# Patient Record
Sex: FEMALE | Race: BLACK OR AFRICAN AMERICAN | NOT HISPANIC OR LATINO | URBAN - METROPOLITAN AREA
[De-identification: names, ages, dates, MRNs, and addresses within clinical notes are randomized per-mention and may not be internally consistent; named-entity substitution may affect disease eponyms.]

---

## 2019-06-06 ENCOUNTER — INPATIENT (INPATIENT)
Facility: HOSPITAL | Age: 69
LOS: 12 days | Discharge: NURSING, REHAB OR LONG-TERM CARE | End: 2019-06-19
Attending: HOSPITALIST | Admitting: HOSPITALIST
Payer: MEDICARE

## 2019-06-06 VITALS
OXYGEN SATURATION: 99 % | WEIGHT: 140.21 LBS | SYSTOLIC BLOOD PRESSURE: 119 MMHG | HEART RATE: 62 BPM | DIASTOLIC BLOOD PRESSURE: 50 MMHG

## 2019-06-06 DIAGNOSIS — A41.9 SEPSIS, UNSPECIFIED ORGANISM: ICD-10-CM

## 2019-06-06 LAB
ALBUMIN SERPL ELPH-MCNC: 3.6 G/DL — SIGNIFICANT CHANGE UP (ref 3.3–5)
ALP SERPL-CCNC: 58 U/L — SIGNIFICANT CHANGE UP (ref 40–120)
ALT FLD-CCNC: 11 U/L — SIGNIFICANT CHANGE UP (ref 4–33)
ANION GAP SERPL CALC-SCNC: 8 MMO/L — SIGNIFICANT CHANGE UP (ref 7–14)
ANISOCYTOSIS BLD QL: SLIGHT — SIGNIFICANT CHANGE UP
APPEARANCE UR: CLEAR — SIGNIFICANT CHANGE UP
AST SERPL-CCNC: 21 U/L — SIGNIFICANT CHANGE UP (ref 4–32)
BASE EXCESS BLDV CALC-SCNC: 10.3 MMOL/L — SIGNIFICANT CHANGE UP
BASE EXCESS BLDV CALC-SCNC: 4.8 MMOL/L — SIGNIFICANT CHANGE UP
BASOPHILS # BLD AUTO: 0 K/UL — SIGNIFICANT CHANGE UP (ref 0–0.2)
BASOPHILS NFR BLD AUTO: 0 % — SIGNIFICANT CHANGE UP (ref 0–2)
BASOPHILS NFR SPEC: 0 % — SIGNIFICANT CHANGE UP (ref 0–2)
BILIRUB SERPL-MCNC: < 0.2 MG/DL — LOW (ref 0.2–1.2)
BILIRUB UR-MCNC: NEGATIVE — SIGNIFICANT CHANGE UP
BLASTS # FLD: 0 % — SIGNIFICANT CHANGE UP (ref 0–0)
BLOOD GAS VENOUS - CREATININE: 0.59 MG/DL — SIGNIFICANT CHANGE UP (ref 0.5–1.3)
BLOOD GAS VENOUS - CREATININE: SIGNIFICANT CHANGE UP MG/DL (ref 0.5–1.3)
BLOOD UR QL VISUAL: NEGATIVE — SIGNIFICANT CHANGE UP
BUN SERPL-MCNC: 29 MG/DL — HIGH (ref 7–23)
CALCIUM SERPL-MCNC: 9.5 MG/DL — SIGNIFICANT CHANGE UP (ref 8.4–10.5)
CHLORIDE BLDV-SCNC: 106 MMOL/L — SIGNIFICANT CHANGE UP (ref 96–108)
CHLORIDE BLDV-SCNC: 111 MMOL/L — HIGH (ref 96–108)
CHLORIDE SERPL-SCNC: 104 MMOL/L — SIGNIFICANT CHANGE UP (ref 98–107)
CO2 SERPL-SCNC: 35 MMOL/L — HIGH (ref 22–31)
COLOR SPEC: SIGNIFICANT CHANGE UP
CORTIS SERPL-MCNC: 5.9 UG/DL — SIGNIFICANT CHANGE UP (ref 2.7–18.4)
CREAT SERPL-MCNC: 0.55 MG/DL — SIGNIFICANT CHANGE UP (ref 0.5–1.3)
EOSINOPHIL # BLD AUTO: 0.01 K/UL — SIGNIFICANT CHANGE UP (ref 0–0.5)
EOSINOPHIL NFR BLD AUTO: 0.4 % — SIGNIFICANT CHANGE UP (ref 0–6)
EOSINOPHIL NFR FLD: 0.9 % — SIGNIFICANT CHANGE UP (ref 0–6)
GAS PNL BLDV: 142 MMOL/L — SIGNIFICANT CHANGE UP (ref 136–146)
GAS PNL BLDV: 143 MMOL/L — SIGNIFICANT CHANGE UP (ref 136–146)
GIANT PLATELETS BLD QL SMEAR: PRESENT — SIGNIFICANT CHANGE UP
GLUCOSE BLDV-MCNC: 177 MG/DL — HIGH (ref 70–99)
GLUCOSE BLDV-MCNC: 86 MG/DL — SIGNIFICANT CHANGE UP (ref 70–99)
GLUCOSE SERPL-MCNC: 92 MG/DL — SIGNIFICANT CHANGE UP (ref 70–99)
GLUCOSE UR-MCNC: 150 — HIGH
HCO3 BLDV-SCNC: 29 MMOL/L — HIGH (ref 20–27)
HCO3 BLDV-SCNC: 32 MMOL/L — HIGH (ref 20–27)
HCT VFR BLD CALC: 29.5 % — LOW (ref 34.5–45)
HCT VFR BLD CALC: 35.3 % — SIGNIFICANT CHANGE UP (ref 34.5–45)
HCT VFR BLDV CALC: 29.9 % — LOW (ref 34.5–45)
HCT VFR BLDV CALC: 33.3 % — LOW (ref 34.5–45)
HGB BLD-MCNC: 10.9 G/DL — LOW (ref 11.5–15.5)
HGB BLD-MCNC: 9.1 G/DL — LOW (ref 11.5–15.5)
HGB BLDV-MCNC: 10.8 G/DL — LOW (ref 11.5–15.5)
HGB BLDV-MCNC: 9.7 G/DL — LOW (ref 11.5–15.5)
HYPOCHROMIA BLD QL: SLIGHT — SIGNIFICANT CHANGE UP
IMM GRANULOCYTES NFR BLD AUTO: 0.8 % — SIGNIFICANT CHANGE UP (ref 0–1.5)
KETONES UR-MCNC: SIGNIFICANT CHANGE UP
LACTATE BLDV-MCNC: 0.9 MMOL/L — SIGNIFICANT CHANGE UP (ref 0.5–2)
LACTATE BLDV-MCNC: 1.4 MMOL/L — SIGNIFICANT CHANGE UP (ref 0.5–2)
LEUKOCYTE ESTERASE UR-ACNC: NEGATIVE — SIGNIFICANT CHANGE UP
LYMPHOCYTES # BLD AUTO: 1.23 K/UL — SIGNIFICANT CHANGE UP (ref 1–3.3)
LYMPHOCYTES # BLD AUTO: 50.6 % — HIGH (ref 13–44)
LYMPHOCYTES NFR SPEC AUTO: 57.8 % — HIGH (ref 13–44)
MACROCYTES BLD QL: SLIGHT — SIGNIFICANT CHANGE UP
MAGNESIUM SERPL-MCNC: 1.6 MG/DL — SIGNIFICANT CHANGE UP (ref 1.6–2.6)
MAGNESIUM SERPL-MCNC: 2.1 MG/DL — SIGNIFICANT CHANGE UP (ref 1.6–2.6)
MANUAL SMEAR VERIFICATION: SIGNIFICANT CHANGE UP
MCHC RBC-ENTMCNC: 28.7 PG — SIGNIFICANT CHANGE UP (ref 27–34)
MCHC RBC-ENTMCNC: 29.1 PG — SIGNIFICANT CHANGE UP (ref 27–34)
MCHC RBC-ENTMCNC: 30.8 % — LOW (ref 32–36)
MCHC RBC-ENTMCNC: 30.9 % — LOW (ref 32–36)
MCV RBC AUTO: 93.1 FL — SIGNIFICANT CHANGE UP (ref 80–100)
MCV RBC AUTO: 94.1 FL — SIGNIFICANT CHANGE UP (ref 80–100)
METAMYELOCYTES # FLD: 0 % — SIGNIFICANT CHANGE UP (ref 0–1)
MONOCYTES # BLD AUTO: 0.29 K/UL — SIGNIFICANT CHANGE UP (ref 0–0.9)
MONOCYTES NFR BLD AUTO: 11.9 % — SIGNIFICANT CHANGE UP (ref 2–14)
MONOCYTES NFR BLD: 5.2 % — SIGNIFICANT CHANGE UP (ref 2–9)
MYELOCYTES NFR BLD: 0 % — SIGNIFICANT CHANGE UP (ref 0–0)
NEUTROPHIL AB SER-ACNC: 32.7 % — LOW (ref 43–77)
NEUTROPHILS # BLD AUTO: 0.88 K/UL — LOW (ref 1.8–7.4)
NEUTROPHILS NFR BLD AUTO: 36.3 % — LOW (ref 43–77)
NEUTS BAND # BLD: 0 % — SIGNIFICANT CHANGE UP (ref 0–6)
NITRITE UR-MCNC: NEGATIVE — SIGNIFICANT CHANGE UP
NRBC # BLD: 2 /100WBC — SIGNIFICANT CHANGE UP
NRBC # FLD: 0 K/UL — SIGNIFICANT CHANGE UP (ref 0–0)
NRBC # FLD: 0 K/UL — SIGNIFICANT CHANGE UP (ref 0–0)
OTHER - HEMATOLOGY %: 0 — SIGNIFICANT CHANGE UP
PCO2 BLDV: 39 MMHG — LOW (ref 41–51)
PCO2 BLDV: 72 MMHG — HIGH (ref 41–51)
PH BLDV: 7.33 PH — SIGNIFICANT CHANGE UP (ref 7.32–7.43)
PH BLDV: 7.47 PH — HIGH (ref 7.32–7.43)
PH UR: 6 — SIGNIFICANT CHANGE UP (ref 5–8)
PHOSPHATE SERPL-MCNC: 3.7 MG/DL — SIGNIFICANT CHANGE UP (ref 2.5–4.5)
PLATELET # BLD AUTO: 48 K/UL — LOW (ref 150–400)
PLATELET # BLD AUTO: 69 K/UL — LOW (ref 150–400)
PLATELET COUNT - ESTIMATE: SIGNIFICANT CHANGE UP
PMV BLD: 13 FL — SIGNIFICANT CHANGE UP (ref 7–13)
PMV BLD: 13.7 FL — HIGH (ref 7–13)
PO2 BLDV: 50 MMHG — HIGH (ref 35–40)
PO2 BLDV: 70 MMHG — HIGH (ref 35–40)
POLYCHROMASIA BLD QL SMEAR: SLIGHT — SIGNIFICANT CHANGE UP
POTASSIUM BLDV-SCNC: 3.2 MMOL/L — LOW (ref 3.4–4.5)
POTASSIUM BLDV-SCNC: 3.8 MMOL/L — SIGNIFICANT CHANGE UP (ref 3.4–4.5)
POTASSIUM SERPL-MCNC: 3.9 MMOL/L — SIGNIFICANT CHANGE UP (ref 3.5–5.3)
POTASSIUM SERPL-SCNC: 3.9 MMOL/L — SIGNIFICANT CHANGE UP (ref 3.5–5.3)
PROMYELOCYTES # FLD: 0 % — SIGNIFICANT CHANGE UP (ref 0–0)
PROT SERPL-MCNC: 6.2 G/DL — SIGNIFICANT CHANGE UP (ref 6–8.3)
PROT UR-MCNC: NEGATIVE — SIGNIFICANT CHANGE UP
RBC # BLD: 3.17 M/UL — LOW (ref 3.8–5.2)
RBC # BLD: 3.75 M/UL — LOW (ref 3.8–5.2)
RBC # FLD: 13.9 % — SIGNIFICANT CHANGE UP (ref 10.3–14.5)
RBC # FLD: 14 % — SIGNIFICANT CHANGE UP (ref 10.3–14.5)
SAO2 % BLDV: 80.1 % — SIGNIFICANT CHANGE UP (ref 60–85)
SAO2 % BLDV: 95.5 % — HIGH (ref 60–85)
SODIUM SERPL-SCNC: 147 MMOL/L — HIGH (ref 135–145)
SP GR SPEC: 1.02 — SIGNIFICANT CHANGE UP (ref 1–1.04)
TROPONIN T, HIGH SENSITIVITY: 35 NG/L — SIGNIFICANT CHANGE UP (ref ?–14)
TSH SERPL-MCNC: 3.7 UIU/ML — SIGNIFICANT CHANGE UP (ref 0.27–4.2)
UROBILINOGEN FLD QL: NORMAL — SIGNIFICANT CHANGE UP
VARIANT LYMPHS # BLD: 3.4 % — SIGNIFICANT CHANGE UP
VIT B12 SERPL-MCNC: 1268 PG/ML — HIGH (ref 200–900)
WBC # BLD: 2.43 K/UL — LOW (ref 3.8–10.5)
WBC # BLD: 2.74 K/UL — LOW (ref 3.8–10.5)
WBC # FLD AUTO: 2.43 K/UL — LOW (ref 3.8–10.5)
WBC # FLD AUTO: 2.74 K/UL — LOW (ref 3.8–10.5)

## 2019-06-06 PROCEDURE — 70450 CT HEAD/BRAIN W/O DYE: CPT | Mod: 26

## 2019-06-06 PROCEDURE — 99291 CRITICAL CARE FIRST HOUR: CPT

## 2019-06-06 PROCEDURE — 93308 TTE F-UP OR LMTD: CPT | Mod: 26,GC

## 2019-06-06 PROCEDURE — 76604 US EXAM CHEST: CPT | Mod: 26,GC

## 2019-06-06 PROCEDURE — 71045 X-RAY EXAM CHEST 1 VIEW: CPT | Mod: 26

## 2019-06-06 RX ORDER — VANCOMYCIN HCL 1 G
1000 VIAL (EA) INTRAVENOUS EVERY 12 HOURS
Refills: 0 | Status: DISCONTINUED | OUTPATIENT
Start: 2019-06-06 | End: 2019-06-08

## 2019-06-06 RX ORDER — PANTOPRAZOLE SODIUM 20 MG/1
40 TABLET, DELAYED RELEASE ORAL DAILY
Refills: 0 | Status: DISCONTINUED | OUTPATIENT
Start: 2019-06-06 | End: 2019-06-19

## 2019-06-06 RX ORDER — FENTANYL CITRATE 50 UG/ML
0.5 INJECTION INTRAVENOUS
Qty: 2500 | Refills: 0 | Status: DISCONTINUED | OUTPATIENT
Start: 2019-06-06 | End: 2019-06-06

## 2019-06-06 RX ORDER — PIPERACILLIN AND TAZOBACTAM 4; .5 G/20ML; G/20ML
3.38 INJECTION, POWDER, LYOPHILIZED, FOR SOLUTION INTRAVENOUS ONCE
Refills: 0 | Status: COMPLETED | OUTPATIENT
Start: 2019-06-06 | End: 2019-06-06

## 2019-06-06 RX ORDER — MIDAZOLAM HYDROCHLORIDE 1 MG/ML
0.02 INJECTION, SOLUTION INTRAMUSCULAR; INTRAVENOUS
Qty: 100 | Refills: 0 | Status: DISCONTINUED | OUTPATIENT
Start: 2019-06-06 | End: 2019-06-06

## 2019-06-06 RX ORDER — HEPARIN SODIUM 5000 [USP'U]/ML
5000 INJECTION INTRAVENOUS; SUBCUTANEOUS EVERY 8 HOURS
Refills: 0 | Status: DISCONTINUED | OUTPATIENT
Start: 2019-06-06 | End: 2019-06-07

## 2019-06-06 RX ORDER — SODIUM CHLORIDE 9 MG/ML
1000 INJECTION INTRAMUSCULAR; INTRAVENOUS; SUBCUTANEOUS ONCE
Refills: 0 | Status: COMPLETED | OUTPATIENT
Start: 2019-06-06 | End: 2019-06-06

## 2019-06-06 RX ORDER — SODIUM CHLORIDE 9 MG/ML
2000 INJECTION INTRAMUSCULAR; INTRAVENOUS; SUBCUTANEOUS ONCE
Refills: 0 | Status: COMPLETED | OUTPATIENT
Start: 2019-06-06 | End: 2019-06-06

## 2019-06-06 RX ORDER — DEXAMETHASONE 0.5 MG/5ML
5 ELIXIR ORAL ONCE
Refills: 0 | Status: COMPLETED | OUTPATIENT
Start: 2019-06-06 | End: 2019-06-07

## 2019-06-06 RX ORDER — MIDAZOLAM HYDROCHLORIDE 1 MG/ML
0.02 INJECTION, SOLUTION INTRAMUSCULAR; INTRAVENOUS
Qty: 100 | Refills: 0 | Status: DISCONTINUED | OUTPATIENT
Start: 2019-06-06 | End: 2019-06-07

## 2019-06-06 RX ORDER — PROPOFOL 10 MG/ML
5 INJECTION, EMULSION INTRAVENOUS
Qty: 1000 | Refills: 0 | Status: DISCONTINUED | OUTPATIENT
Start: 2019-06-06 | End: 2019-06-06

## 2019-06-06 RX ORDER — ETOMIDATE 2 MG/ML
20 INJECTION INTRAVENOUS ONCE
Refills: 0 | Status: COMPLETED | OUTPATIENT
Start: 2019-06-06 | End: 2019-06-06

## 2019-06-06 RX ORDER — COSYNTROPIN 0.25 MG/ML
0.25 INJECTION, SOLUTION INTRAVENOUS ONCE
Refills: 0 | Status: COMPLETED | OUTPATIENT
Start: 2019-06-06 | End: 2019-06-07

## 2019-06-06 RX ORDER — AZITHROMYCIN 500 MG/1
500 TABLET, FILM COATED ORAL EVERY 24 HOURS
Refills: 0 | Status: DISCONTINUED | OUTPATIENT
Start: 2019-06-06 | End: 2019-06-07

## 2019-06-06 RX ORDER — PIPERACILLIN AND TAZOBACTAM 4; .5 G/20ML; G/20ML
3.38 INJECTION, POWDER, LYOPHILIZED, FOR SOLUTION INTRAVENOUS EVERY 8 HOURS
Refills: 0 | Status: DISCONTINUED | OUTPATIENT
Start: 2019-06-06 | End: 2019-06-12

## 2019-06-06 RX ORDER — DEXTROSE 50 % IN WATER 50 %
50 SYRINGE (ML) INTRAVENOUS ONCE
Refills: 0 | Status: COMPLETED | OUTPATIENT
Start: 2019-06-06 | End: 2019-06-06

## 2019-06-06 RX ORDER — CHLORHEXIDINE GLUCONATE 213 G/1000ML
1 SOLUTION TOPICAL
Refills: 0 | Status: DISCONTINUED | OUTPATIENT
Start: 2019-06-06 | End: 2019-06-12

## 2019-06-06 RX ORDER — CEFTRIAXONE 500 MG/1
1 INJECTION, POWDER, FOR SOLUTION INTRAMUSCULAR; INTRAVENOUS ONCE
Refills: 0 | Status: COMPLETED | OUTPATIENT
Start: 2019-06-06 | End: 2019-06-06

## 2019-06-06 RX ORDER — ROCURONIUM BROMIDE 10 MG/ML
100 VIAL (ML) INTRAVENOUS ONCE
Refills: 0 | Status: COMPLETED | OUTPATIENT
Start: 2019-06-06 | End: 2019-06-06

## 2019-06-06 RX ORDER — NOREPINEPHRINE BITARTRATE/D5W 8 MG/250ML
0.05 PLASTIC BAG, INJECTION (ML) INTRAVENOUS
Qty: 8 | Refills: 0 | Status: DISCONTINUED | OUTPATIENT
Start: 2019-06-06 | End: 2019-06-07

## 2019-06-06 RX ORDER — FENTANYL CITRATE 50 UG/ML
0.5 INJECTION INTRAVENOUS
Qty: 2500 | Refills: 0 | Status: DISCONTINUED | OUTPATIENT
Start: 2019-06-06 | End: 2019-06-09

## 2019-06-06 RX ADMIN — SODIUM CHLORIDE 1000 MILLILITER(S): 9 INJECTION INTRAMUSCULAR; INTRAVENOUS; SUBCUTANEOUS at 18:29

## 2019-06-06 RX ADMIN — CEFTRIAXONE 100 GRAM(S): 500 INJECTION, POWDER, FOR SOLUTION INTRAMUSCULAR; INTRAVENOUS at 18:28

## 2019-06-06 RX ADMIN — ETOMIDATE 20 MILLIGRAM(S): 2 INJECTION INTRAVENOUS at 18:50

## 2019-06-06 RX ADMIN — PROPOFOL 1.91 MICROGRAM(S)/KG/MIN: 10 INJECTION, EMULSION INTRAVENOUS at 19:52

## 2019-06-06 RX ADMIN — PIPERACILLIN AND TAZOBACTAM 200 GRAM(S): 4; .5 INJECTION, POWDER, LYOPHILIZED, FOR SOLUTION INTRAVENOUS at 21:08

## 2019-06-06 RX ADMIN — SODIUM CHLORIDE 1000 MILLILITER(S): 9 INJECTION INTRAMUSCULAR; INTRAVENOUS; SUBCUTANEOUS at 19:26

## 2019-06-06 RX ADMIN — Medication 5.96 MICROGRAM(S)/KG/MIN: at 19:51

## 2019-06-06 RX ADMIN — AZITHROMYCIN 250 MILLIGRAM(S): 500 TABLET, FILM COATED ORAL at 21:35

## 2019-06-06 RX ADMIN — Medication 100 MILLIGRAM(S): at 18:50

## 2019-06-06 RX ADMIN — Medication 50 MILLILITER(S): at 18:29

## 2019-06-06 NOTE — ED PROVIDER NOTE - CLINICAL SUMMARY MEDICAL DECISION MAKING FREE TEXT BOX
- lethargy, eval infection, r/o ICH as possible unwitnessed fall at home; r/o DVT in edematous LE  - cbc, cmp, trop, ua, urine culture, ekg, ct head, cxr - lethargy, eval infection, r/o ICH as possible unwitnessed fall at home; r/o DVT in edematous LE  - cbc, cmp, trop, ua, blood cx, urine culture, ekg, ct head, cxr    Aidan José, PGY1: h.o cva, dementia, schizo, p.w acute change in alertness w. hypothermia, concerning for sepsis. r.o ICH due to similar episode prior to last stroke. + for cough and LE swelling R>L, will get cxr and LE dvt.

## 2019-06-06 NOTE — H&P ADULT - NSHPPHYSICALEXAM_GEN_ALL_CORE
VS: /50, HR 62, T 90  GENERAL: NAD  HEAD:  Atraumatic, Normocephalic  EYES: EOMI, conjunctiva and sclera clear  NECK: Supple, No JVD  CHEST/LUNG: Coarse breath sound   HEART: Regular rate and rhythm; No murmurs, rubs, or gallops  ABDOMEN: Soft, Nontender, Nondistended; Bowel sounds present  EXTREMITIES:  2+ Peripheral Pulses, 1+ LE edema  PSYCH:   NEUROLOGY: non-focal  SKIN: No rashes or lesions

## 2019-06-06 NOTE — H&P ADULT - NSHPLABSRESULTS_GEN_ALL_CORE
10.9   2.43  )-----------( 69       ( 2019 17:06 )             35.3     2019 17:06    147    |  104    |  29     ----------------------------<  92     3.9     |  35     |  0.55     Ca    9.5        2019 17:06  Phos  3.7       2019 17:06  Mg     2.1       2019 17:06    TPro  6.2    /  Alb  3.6    /  TBili  < 0.2  /  DBili  x      /  AST  21     /  ALT  11     /  AlkPhos  58     2019 17:06    LIVER FUNCTIONS - ( 2019 17:06 )  Alb: 3.6 g/dL / Pro: 6.2 g/dL / ALK PHOS: 58 u/L / ALT: 11 u/L / AST: 21 u/L / GGT: x             CAPILLARY BLOOD GLUCOSE      POCT Blood Glucose.: 100 mg/dL (2019 15:45)        Urinalysis Basic - ( 2019 18:38 )    Color: LIGHT YELLOW / Appearance: CLEAR / S.023 / pH: 6.0  Gluc: 150 / Ketone: TRACE  / Bili: NEGATIVE / Urobili: NORMAL   Blood: NEGATIVE / Protein: NEGATIVE / Nitrite: NEGATIVE   Leuk Esterase: NEGATIVE / RBC: x / WBC x   Sq Epi: x / Non Sq Epi: x / Bacteria: x

## 2019-06-06 NOTE — ED ADULT NURSE NOTE - OBJECTIVE STATEMENT
break coverage RN: 69yof currently responsive only to painful stimuli, non ambulatory at this time brought in t ED for increased lethargy. per pt family at home, pt has hx dementia, but is typically a&ox3, amb w/ her walker at baseline. pt daughter states over the past week pt has become increasingly fatigued, and virtually nonresponsive for the last day. upon receiving pt to room, pt found to be rectally hypothermic to 90. pt became bradycardic to 30 during exam. pt breathing even./unlabored. sating 100% on RA. pt daughter reports increasing incontinence and frequency w/ urination. pt family denies recent illness/hosipital stays. 18g iv lock placed to R ac. 20g iv lock placed to L. ella stubbs placed for temperature monitring. pt currently nsr on cardiac monitro. reprot given to eitan TORRES.

## 2019-06-06 NOTE — ED PROVIDER NOTE - OBJECTIVE STATEMENT
69F h/o HTN, dementia, schizoaffective disorder, presents with 1-2 days of lethargy/weakness.  As per daughter, pt seems to have recent increased urinary frequency.  Cough x 1 day.  Started on HCTZ yesterday for BLE edema.  Denies fever/chills, sob, n/v/d, sick contacts. 69F h/o HTN, dementia, schizoaffective disorder, presents with 1-2 days of lethargy/weakness. At baseline, Pt is able to speak and feed herself independently, but unable to sit up straight. As per daughter, pt seems to have recent increased urinary frequency. Cough x 1 day.  Started on HCTZ yesterday for BLE edema.  Denies fever/chills, sob, n/v/d, sick contacts. similar presentation last year and admit to hospital for pna and stroke.

## 2019-06-06 NOTE — PROCEDURE NOTE - NSTRACHPOSTINTU_RESP_A_CORE
Breath sounds bilateral/Appropriate capnography/Positive end tidal Co2 noted/Breath sounds equal/Chest X-Ray

## 2019-06-06 NOTE — PROCEDURE NOTE - SUPERVISORY STATEMENT
Dr. Rollins:  I have personally performed a face to face bedside history and physical examination of this patient. I have discussed the history, examination, review of systems, assessment and plan of management with the resident. I have reviewed the electronic medical record and amended it to reflect my history, review of systems, physical exam, assessment and plan.
Dr. Rollins:  I have personally performed a face to face bedside history and physical examination of this patient. I have discussed the history, examination, review of systems, assessment and plan of management with the resident. I have reviewed the electronic medical record and amended it to reflect my history, review of systems, physical exam, assessment and plan.

## 2019-06-06 NOTE — ED PROVIDER NOTE - CRITICAL CARE PROVIDED
consultation with other physicians/direct patient care (not related to procedure)/consult w/ pt's family directly relating to pts condition

## 2019-06-06 NOTE — ED ADULT TRIAGE NOTE - CHIEF COMPLAINT QUOTE
BIBEMS from home for weakness upon waking this morning, pt appears lethargic, pt placed on Hydrochlorothiazide started yesterday for b/l LE edema, pt nonverbal in triage, as per dtr pt has been like this since yesterday but worse this morning PMH: depression, HTN, unable to take oral temp pt not closing lips, EKG in progress

## 2019-06-06 NOTE — H&P ADULT - HISTORY OF PRESENT ILLNESS
69F PMH HTN, dementia, schizoaffective disorder presents with lethargy and altered mental status. History provided by daughter at bedside. She has been feeling week for the past week and unable to swallow her medications. She was seen at an OSH recently and was started on HCTX for leg swelling. This morning, patient became confused with slurred speech. Patient could not recognize daughter so she was concern and brought her to the ED. Endorses cough for 1 day. Denies fever, SOB, nausea/vomiting, dysuria. No sick contacts, no recent travels.     ED course  - Ceftriaxone 1g  - PCO2 72  - 3L IVF given   - Intubated for hypercarbic respiratory failure

## 2019-06-06 NOTE — H&P ADULT - ASSESSMENT
69F PMH HTN, dementia, schizoaffective disorder presents with lethargy and altered mental status. Intubated for hypercarbic respiratory failure     # Neuro:   - History of dementia  - Sedated on propofol    # PSYCH:  - History of schizoaffective disorder    # CV  - History of HTN   - Now on pressor     # Resp  - Intubated on 6/6 for hypercarbic respiratory failure    # GI  - No issues       # Renal  - Cr wnl. No known history of CKD  - Monitor electrolytes and replete PRN    # Endo:  - No history of DM  - Monitor FS    # Heme  - H/H, platelet stable. No sign of bleeding  - Transfuse Hb <b    # ID  - Leukocytosis. hypothermic  - s/p ceftriaxone   - F/u cultures     # DVT ppx  - Heparin subQ 69F PMH HTN, dementia, schizoaffective disorder presents with lethargy and altered mental status. Intubated for hypercarbic respiratory failure     # Neuro:   - History of dementia  - Sedated on propofol    # PSYCH:  - History of schizoaffective disorder    # CV  - History of HTN   - Bradycardia. Now on pressor.     # Resp  - Intubated on 6/6 for hypercarbic respiratory failure    # GI  - No issues       # Renal  - Cr wnl. No known history of CKD  - Monitor electrolytes and replete PRN    # Endo:  - No history of DM  - Monitor FS    # Heme  - H/H, platelet stable. No sign of bleeding  - Transfuse Hb <b    # ID  - Leukocytosis. hypothermic  - s/p ceftriaxone in the ED   - F/u cultures     # DVT ppx  - Heparin subQ

## 2019-06-06 NOTE — ED ADULT NURSE REASSESSMENT NOTE - NS ED NURSE REASSESS COMMENT FT1
6:45pm Pt noted to become bradycardiac on monitor , pt with pacer pads in place , pt also note to become more lethargic.  Attending called to bedside pt intubated ET tube 7.5 , 23 at lip line.  fluids given pressures ures started.  Pts blood pressure improving. Pt remains on hypothermic blanket . Pt remains bradycardic on cardiac monitor. Pt awaitng dispo.

## 2019-06-06 NOTE — ED ADULT NURSE REASSESSMENT NOTE - NS ED NURSE REASSESS COMMENT FT1
Received report from MELISSA Galindo. ICU RN Lianna at bedside closely monitoring patient. VSS and as noted, MD and family at bedside, will continue to monitor. Received report from MELISSA Galindo. ICU RN Lianna at bedside closely monitoring patient. Pt intubated at this time, tolerating sedation well. Vitals as noted, MD and family at bedside, will continue to monitor.

## 2019-06-06 NOTE — ED PROVIDER NOTE - PROGRESS NOTE DETAILS
Aidan José, PGY1: patient is note to be hypothermic and bradycardiac to high 30s. blood cx, urine cx, ctx 1g were ordered and patient was placed on cardiac montior and cardiac pats pending need for pacing. 2L of warm IV was given and fingerstick was 60s and 1 amp of D50 was given. Pt's alertness and pulse recovered. Airway cart at bedside if necessary. Concerning for septic induced hypothermia. Aidan José, PGY1: patient mark again with decreased mental status, decision were made to intubate the patient pending airway compromise. push epi was given and intubed with etomide and carole. NG was placed. Aidan José, PGY1: patient is note to be hypothermic and bradycardiac to high 30s. blood cx, urine cx, ctx 1g were ordered and patient was placed on cardiac montior and cardiac pats pending need for pacing. 2L of warm IV was given and fingerstick was 60s and 1 amp of D50 was given. Pt's alertness and pulse recovered. Airway cart at bedside if necessary. Concerning for septic induced hypothermia vs myexedema coma

## 2019-06-06 NOTE — H&P ADULT - ATTENDING COMMENTS
68 yo woman with h/o schizoaffective disorder, dementia (dx'd 2013), HTN, DM2, presenting with progressive weakness and lethargy, AMS and found to be hypothermic and bradycardic in ED, intubated when she became less responsive and deemed unable to protect airway, VBG pCO2 70's.  HR initially 30's, T 90 degrees, FS 60, pt received D50, epi in ED and briefly required norepi for BP drop after intubation and sedation.  Pt seen and examined with ICU team  Off pressors , HR 48 sinus, T 92,   vent A/C 15/400/40% +5  POCUS: Lungs A line predom ant and post; no Pleff; no consolidation  Cor: mild LVH, LVSF appears good; IVC 1.8cm    Hypercapnic respiratory failure  Hypothermia, sinus mark, mild pancytopenia, neutropenia: poss sepsis, need to r/o drug/med effect, adrenal insuff, viral (HIV, EBV, HCV); lactate wnl  DM2    - get full med list from family  - urine tox  - cultures and broad spec Abx  - maintain lung protective vent settings  - spot cortisol now, am cortisol  - TSH is wnl  - Bear hugger  - maintaining adequate BP despite sinus mark in 40's; continue to monitor as she warms; atropine at bedside, pacer pads on    very guarded  cc time spent 40 min excluding time spent on POCUS

## 2019-06-06 NOTE — CHART NOTE - NSCHARTNOTEFT_GEN_A_CORE
I called two separate times to Dr. Jose M Cm's office and talked with the call service. No call back was received over a 2.5 hour period. I called [phone].    Maxwell Yu EM/IM PGY-1

## 2019-06-07 LAB
ALBUMIN SERPL ELPH-MCNC: 2.8 G/DL — LOW (ref 3.3–5)
ALP SERPL-CCNC: 47 U/L — SIGNIFICANT CHANGE UP (ref 40–120)
ALT FLD-CCNC: 14 U/L — SIGNIFICANT CHANGE UP (ref 4–33)
ANION GAP SERPL CALC-SCNC: 10 MMO/L — SIGNIFICANT CHANGE UP (ref 7–14)
AST SERPL-CCNC: 21 U/L — SIGNIFICANT CHANGE UP (ref 4–32)
B PERT DNA SPEC QL NAA+PROBE: NOT DETECTED — SIGNIFICANT CHANGE UP
BASE EXCESS BLDA CALC-SCNC: 4.7 MMOL/L — SIGNIFICANT CHANGE UP
BASE EXCESS BLDA CALC-SCNC: 5.8 MMOL/L — SIGNIFICANT CHANGE UP
BASOPHILS # BLD AUTO: 0 K/UL — SIGNIFICANT CHANGE UP (ref 0–0.2)
BASOPHILS NFR BLD AUTO: 0 % — SIGNIFICANT CHANGE UP (ref 0–2)
BILIRUB SERPL-MCNC: 0.3 MG/DL — SIGNIFICANT CHANGE UP (ref 0.2–1.2)
BLOOD GAS ARTERIAL - FIO2: 30 — SIGNIFICANT CHANGE UP
BUN SERPL-MCNC: 21 MG/DL — SIGNIFICANT CHANGE UP (ref 7–23)
C PNEUM DNA SPEC QL NAA+PROBE: NOT DETECTED — SIGNIFICANT CHANGE UP
CA-I BLDA-SCNC: 1.1 MMOL/L — LOW (ref 1.15–1.29)
CALCIUM SERPL-MCNC: 7.8 MG/DL — LOW (ref 8.4–10.5)
CHLORIDE BLDA-SCNC: 109 MMOL/L — HIGH (ref 96–108)
CHLORIDE SERPL-SCNC: 106 MMOL/L — SIGNIFICANT CHANGE UP (ref 98–107)
CO2 SERPL-SCNC: 26 MMOL/L — SIGNIFICANT CHANGE UP (ref 22–31)
CORTIS SERPL-MCNC: 12.1 UG/DL — SIGNIFICANT CHANGE UP (ref 2.7–18.4)
CORTIS SERPL-MCNC: 3.2 UG/DL — SIGNIFICANT CHANGE UP (ref 2.7–18.4)
CREAT SERPL-MCNC: 0.56 MG/DL — SIGNIFICANT CHANGE UP (ref 0.5–1.3)
EBV EA AB TITR SER IF: POSITIVE — SIGNIFICANT CHANGE UP
EBV EA IGG SER-ACNC: NEGATIVE — SIGNIFICANT CHANGE UP
EBV PATRN SPEC IB-IMP: SIGNIFICANT CHANGE UP
EBV VCA IGG AVIDITY SER QL IA: POSITIVE — SIGNIFICANT CHANGE UP
EBV VCA IGM TITR FLD: NEGATIVE — SIGNIFICANT CHANGE UP
EOSINOPHIL # BLD AUTO: 0.01 K/UL — SIGNIFICANT CHANGE UP (ref 0–0.5)
EOSINOPHIL NFR BLD AUTO: 0.3 % — SIGNIFICANT CHANGE UP (ref 0–6)
FLUAV H1 2009 PAND RNA SPEC QL NAA+PROBE: NOT DETECTED — SIGNIFICANT CHANGE UP
FLUAV H1 RNA SPEC QL NAA+PROBE: NOT DETECTED — SIGNIFICANT CHANGE UP
FLUAV H3 RNA SPEC QL NAA+PROBE: NOT DETECTED — SIGNIFICANT CHANGE UP
FLUAV SUBTYP SPEC NAA+PROBE: NOT DETECTED — SIGNIFICANT CHANGE UP
FLUBV RNA SPEC QL NAA+PROBE: NOT DETECTED — SIGNIFICANT CHANGE UP
GLUCOSE BLDA-MCNC: 155 MG/DL — HIGH (ref 70–99)
GLUCOSE BLDA-MCNC: 164 MG/DL — HIGH (ref 70–99)
GLUCOSE SERPL-MCNC: 163 MG/DL — HIGH (ref 70–99)
HADV DNA SPEC QL NAA+PROBE: NOT DETECTED — SIGNIFICANT CHANGE UP
HAV IGM SER-ACNC: NONREACTIVE — SIGNIFICANT CHANGE UP
HBV CORE IGM SER-ACNC: NONREACTIVE — SIGNIFICANT CHANGE UP
HBV SURFACE AG SER-ACNC: NONREACTIVE — SIGNIFICANT CHANGE UP
HCO3 BLDA-SCNC: 29 MMOL/L — HIGH (ref 22–26)
HCO3 BLDA-SCNC: 30 MMOL/L — HIGH (ref 22–26)
HCOV PNL SPEC NAA+PROBE: SIGNIFICANT CHANGE UP
HCT VFR BLD CALC: 30.5 % — LOW (ref 34.5–45)
HCT VFR BLDA CALC: 27.5 % — LOW (ref 34.5–46.5)
HCT VFR BLDA CALC: 29.4 % — LOW (ref 34.5–46.5)
HCV AB S/CO SERPL IA: 0.05 S/CO — SIGNIFICANT CHANGE UP (ref 0–0.99)
HCV AB SERPL-IMP: SIGNIFICANT CHANGE UP
HGB BLD-MCNC: 9.5 G/DL — LOW (ref 11.5–15.5)
HGB BLDA-MCNC: 8.9 G/DL — LOW (ref 11.5–15.5)
HGB BLDA-MCNC: 9.5 G/DL — LOW (ref 11.5–15.5)
HMPV RNA SPEC QL NAA+PROBE: NOT DETECTED — SIGNIFICANT CHANGE UP
HPIV1 RNA SPEC QL NAA+PROBE: NOT DETECTED — SIGNIFICANT CHANGE UP
HPIV2 RNA SPEC QL NAA+PROBE: NOT DETECTED — SIGNIFICANT CHANGE UP
HPIV3 RNA SPEC QL NAA+PROBE: NOT DETECTED — SIGNIFICANT CHANGE UP
HPIV4 RNA SPEC QL NAA+PROBE: NOT DETECTED — SIGNIFICANT CHANGE UP
IMM GRANULOCYTES NFR BLD AUTO: 0.3 % — SIGNIFICANT CHANGE UP (ref 0–1.5)
L PNEUMO AG UR QL: NEGATIVE — SIGNIFICANT CHANGE UP
LACTATE BLDA-SCNC: 1.2 MMOL/L — SIGNIFICANT CHANGE UP (ref 0.5–2)
LACTATE BLDA-SCNC: 1.9 MMOL/L — SIGNIFICANT CHANGE UP (ref 0.5–2)
LYMPHOCYTES # BLD AUTO: 0.48 K/UL — LOW (ref 1–3.3)
LYMPHOCYTES # BLD AUTO: 14.3 % — SIGNIFICANT CHANGE UP (ref 13–44)
MAGNESIUM SERPL-MCNC: 1.6 MG/DL — SIGNIFICANT CHANGE UP (ref 1.6–2.6)
MANUAL SMEAR VERIFICATION: SIGNIFICANT CHANGE UP
MCHC RBC-ENTMCNC: 29.5 PG — SIGNIFICANT CHANGE UP (ref 27–34)
MCHC RBC-ENTMCNC: 31.1 % — LOW (ref 32–36)
MCV RBC AUTO: 94.7 FL — SIGNIFICANT CHANGE UP (ref 80–100)
MONOCYTES # BLD AUTO: 0.6 K/UL — SIGNIFICANT CHANGE UP (ref 0–0.9)
MONOCYTES NFR BLD AUTO: 17.9 % — HIGH (ref 2–14)
NEUTROPHILS # BLD AUTO: 2.25 K/UL — SIGNIFICANT CHANGE UP (ref 1.8–7.4)
NEUTROPHILS NFR BLD AUTO: 67.2 % — SIGNIFICANT CHANGE UP (ref 43–77)
NRBC # FLD: 0.02 K/UL — SIGNIFICANT CHANGE UP (ref 0–0)
PCO2 BLDA: 29 MMHG — LOW (ref 32–48)
PCO2 BLDA: 39 MMHG — SIGNIFICANT CHANGE UP (ref 32–48)
PH BLDA: 7.48 PH — HIGH (ref 7.35–7.45)
PH BLDA: 7.6 PH — CRITICAL HIGH (ref 7.35–7.45)
PHOSPHATE SERPL-MCNC: 1.5 MG/DL — LOW (ref 2.5–4.5)
PLATELET # BLD AUTO: 55 K/UL — LOW (ref 150–400)
PMV BLD: 14 FL — HIGH (ref 7–13)
PO2 BLDA: 125 MMHG — HIGH (ref 83–108)
PO2 BLDA: 144 MMHG — HIGH (ref 83–108)
POTASSIUM BLDA-SCNC: 2.8 MMOL/L — CRITICAL LOW (ref 3.4–4.5)
POTASSIUM BLDA-SCNC: 3 MMOL/L — LOW (ref 3.4–4.5)
POTASSIUM SERPL-MCNC: 3.6 MMOL/L — SIGNIFICANT CHANGE UP (ref 3.5–5.3)
POTASSIUM SERPL-SCNC: 3.6 MMOL/L — SIGNIFICANT CHANGE UP (ref 3.5–5.3)
PROT SERPL-MCNC: 5.1 G/DL — LOW (ref 6–8.3)
RBC # BLD: 3.22 M/UL — LOW (ref 3.8–5.2)
RBC # FLD: 13.8 % — SIGNIFICANT CHANGE UP (ref 10.3–14.5)
RSV RNA SPEC QL NAA+PROBE: NOT DETECTED — SIGNIFICANT CHANGE UP
RV+EV RNA SPEC QL NAA+PROBE: NOT DETECTED — SIGNIFICANT CHANGE UP
SAO2 % BLDA: 97.5 % — SIGNIFICANT CHANGE UP (ref 95–99)
SAO2 % BLDA: 98.2 % — SIGNIFICANT CHANGE UP (ref 95–99)
SODIUM BLDA-SCNC: 140 MMOL/L — SIGNIFICANT CHANGE UP (ref 136–146)
SODIUM BLDA-SCNC: 140 MMOL/L — SIGNIFICANT CHANGE UP (ref 136–146)
SODIUM SERPL-SCNC: 142 MMOL/L — SIGNIFICANT CHANGE UP (ref 135–145)
SPECIMEN SOURCE: SIGNIFICANT CHANGE UP
SPECIMEN SOURCE: SIGNIFICANT CHANGE UP
VALPROATE SERPL-MCNC: 69.6 UG/ML — SIGNIFICANT CHANGE UP (ref 50–100)
WBC # BLD: 3.35 K/UL — LOW (ref 3.8–10.5)
WBC # FLD AUTO: 3.35 K/UL — LOW (ref 3.8–10.5)

## 2019-06-07 PROCEDURE — 76604 US EXAM CHEST: CPT | Mod: 26,GC

## 2019-06-07 PROCEDURE — 99291 CRITICAL CARE FIRST HOUR: CPT | Mod: 25

## 2019-06-07 PROCEDURE — 71045 X-RAY EXAM CHEST 1 VIEW: CPT | Mod: 26

## 2019-06-07 PROCEDURE — 93970 EXTREMITY STUDY: CPT | Mod: 26

## 2019-06-07 PROCEDURE — 93308 TTE F-UP OR LMTD: CPT | Mod: 26,GC

## 2019-06-07 RX ORDER — HYDROCORTISONE 20 MG
100 TABLET ORAL EVERY 8 HOURS
Refills: 0 | Status: DISCONTINUED | OUTPATIENT
Start: 2019-06-07 | End: 2019-06-08

## 2019-06-07 RX ORDER — SODIUM CHLORIDE 9 MG/ML
1000 INJECTION INTRAMUSCULAR; INTRAVENOUS; SUBCUTANEOUS ONCE
Refills: 0 | Status: COMPLETED | OUTPATIENT
Start: 2019-06-07 | End: 2019-06-07

## 2019-06-07 RX ORDER — SODIUM,POTASSIUM PHOSPHATES 278-250MG
1 POWDER IN PACKET (EA) ORAL ONCE
Refills: 0 | Status: COMPLETED | OUTPATIENT
Start: 2019-06-07 | End: 2019-06-07

## 2019-06-07 RX ORDER — POTASSIUM PHOSPHATE, MONOBASIC POTASSIUM PHOSPHATE, DIBASIC 236; 224 MG/ML; MG/ML
15 INJECTION, SOLUTION INTRAVENOUS ONCE
Refills: 0 | Status: COMPLETED | OUTPATIENT
Start: 2019-06-07 | End: 2019-06-07

## 2019-06-07 RX ADMIN — FENTANYL CITRATE 3.18 MICROGRAM(S)/KG/HR: 50 INJECTION INTRAVENOUS at 00:52

## 2019-06-07 RX ADMIN — PIPERACILLIN AND TAZOBACTAM 25 GRAM(S): 4; .5 INJECTION, POWDER, LYOPHILIZED, FOR SOLUTION INTRAVENOUS at 14:40

## 2019-06-07 RX ADMIN — COSYNTROPIN 0.25 MILLIGRAM(S): 0.25 INJECTION, SOLUTION INTRAVENOUS at 00:52

## 2019-06-07 RX ADMIN — CHLORHEXIDINE GLUCONATE 1 APPLICATION(S): 213 SOLUTION TOPICAL at 11:21

## 2019-06-07 RX ADMIN — Medication 250 MILLIGRAM(S): at 01:49

## 2019-06-07 RX ADMIN — POTASSIUM PHOSPHATE, MONOBASIC POTASSIUM PHOSPHATE, DIBASIC 62.5 MILLIMOLE(S): 236; 224 INJECTION, SOLUTION INTRAVENOUS at 06:30

## 2019-06-07 RX ADMIN — Medication 1 PACKET(S): at 06:30

## 2019-06-07 RX ADMIN — Medication 5 MILLIGRAM(S): at 00:53

## 2019-06-07 RX ADMIN — PANTOPRAZOLE SODIUM 40 MILLIGRAM(S): 20 TABLET, DELAYED RELEASE ORAL at 11:21

## 2019-06-07 RX ADMIN — Medication 100 MILLIGRAM(S): at 16:42

## 2019-06-07 RX ADMIN — PIPERACILLIN AND TAZOBACTAM 25 GRAM(S): 4; .5 INJECTION, POWDER, LYOPHILIZED, FOR SOLUTION INTRAVENOUS at 22:31

## 2019-06-07 RX ADMIN — PIPERACILLIN AND TAZOBACTAM 25 GRAM(S): 4; .5 INJECTION, POWDER, LYOPHILIZED, FOR SOLUTION INTRAVENOUS at 05:47

## 2019-06-07 RX ADMIN — HEPARIN SODIUM 5000 UNIT(S): 5000 INJECTION INTRAVENOUS; SUBCUTANEOUS at 00:53

## 2019-06-07 RX ADMIN — SODIUM CHLORIDE 4000 MILLILITER(S): 9 INJECTION INTRAMUSCULAR; INTRAVENOUS; SUBCUTANEOUS at 16:52

## 2019-06-07 RX ADMIN — Medication 250 MILLIGRAM(S): at 13:15

## 2019-06-07 RX ADMIN — FENTANYL CITRATE 3.18 MICROGRAM(S)/KG/HR: 50 INJECTION INTRAVENOUS at 08:38

## 2019-06-07 RX ADMIN — Medication 100 MILLIGRAM(S): at 08:35

## 2019-06-07 RX ADMIN — MIDAZOLAM HYDROCHLORIDE 1.27 MG/KG/HR: 1 INJECTION, SOLUTION INTRAMUSCULAR; INTRAVENOUS at 00:52

## 2019-06-07 RX ADMIN — FENTANYL CITRATE 3.18 MICROGRAM(S)/KG/HR: 50 INJECTION INTRAVENOUS at 22:31

## 2019-06-07 RX ADMIN — MIDAZOLAM HYDROCHLORIDE 1.27 MG/KG/HR: 1 INJECTION, SOLUTION INTRAMUSCULAR; INTRAVENOUS at 08:38

## 2019-06-07 NOTE — CHART NOTE - NSCHARTNOTEFT_GEN_A_CORE
: Eze    INDICATION: Resp failure, AMS    PROCEDURE:  [x] LIMITED ECHO  [x] LIMITED CHEST  [ ] LIMITED RETROPERITONEAL  [x] LIMITED ABDOMINAL  [ ] LIMITED DVT  [ ] NEEDLE GUIDANCE VASCULAR  [ ] NEEDLE GUIDANCE THORACENTESIS  [ ] NEEDLE GUIDANCE PARACENTESIS  [ ] NEEDLE GUIDANCE PERICARDIOCENTESIS  [ ] OTHER    FINDINGS: A predominant, grossly preserved LV and RV function, no pleural effusions or consolidations, no hydronephrosis      INTERPRETATION: Likely primary adrenal insufficiency with AMS resulting in resp failure : Eze    INDICATION: Resp failure, AMS    PROCEDURE:  [x] LIMITED ECHO  [x] LIMITED CHEST  [ ] LIMITED RETROPERITONEAL  [x] LIMITED ABDOMINAL  [ ] LIMITED DVT  [ ] NEEDLE GUIDANCE VASCULAR  [ ] NEEDLE GUIDANCE THORACENTESIS  [ ] NEEDLE GUIDANCE PARACENTESIS  [ ] NEEDLE GUIDANCE PERICARDIOCENTESIS  [ ] OTHER    FINDINGS: A predominant, grossly preserved LV and RV function, no pleural effusions or consolidations, no hydronephrosis      INTERPRETATION: lungs are clear no pneumonia Likely primary adrenal insufficiency with AMS resulting in resp failure

## 2019-06-07 NOTE — PROGRESS NOTE ADULT - ASSESSMENT
69F PMH HTN, dementia, schizoaffective disorder presents with lethargy and altered mental status. Intubated for hypercarbic respiratory failure     # Neuro:   - History of dementia  - Sedated on propofol    # PSYCH:  - History of schizoaffective disorder    # CV  - History of HTN   - Bradycardia. Now on pressor.     # Resp  - Intubated on 6/6 for hypercarbic respiratory failure    # GI  - No issues       # Renal  - Cr wnl. No known history of CKD  - Monitor electrolytes and replete PRN    # Endo:  - No history of DM  - Monitor FS    # Heme  - H/H, platelet stable. No sign of bleeding  - Transfuse Hb <b    # ID  - Leukocytosis. hypothermic  - s/p ceftriaxone in the ED   - F/u cultures     # DVT ppx  - Heparin subQ 69F PMH HTN, dementia, schizoaffective disorder presents with lethargy and altered mental status. Intubated for hypercarbic respiratory failure     # Neuro:   - History of dementia  - Sedated on propofol    # PSYCH:  - History of schizoaffective disorder    # CV  - History of HTN   - Bradycardia. Now on pressor.     # Resp  - Intubated on 6/6 for hypercarbic respiratory failure    # GI  - No issues       # Renal  - Cr wnl. No known history of CKD  - Monitor electrolytes and replete PRN    # Endo:  - No history of DM  - Monitor FS    # Heme  - pancytopenic. No sign of bleeding  - Transfuse Hb <7    # ID  - Leukocytosis. hypothermic  - s/p ceftriaxone in the ED   - F/u cultures     # DVT ppx  - Heparin subQ 69F PMH HTN, dementia, schizoaffective disorder presents with lethargy and altered mental status. Intubated for hypercarbic respiratory failure     # Neuro:   - History of dementia  - Sedated on propofol    # PSYCH:  - History of schizoaffective disorder    # CV  - History of HTN   - Bradycardia. was on pressor on admission  - Now off pressor     # Resp  - Intubated on 6/6 for hypercarbic respiratory failure    # GI  - No issues   - Tube feed      # Renal  - Cr wnl. No known history of CKD  - Monitor electrolytes and replete PRN    # Endo:  - No history of DM. Monitor FS  - Adrenal insufficiency: Continue hydrocortisone 100mg Q8    # Heme  - pancytopenic. No sign of bleeding  - Transfuse Hb <7    # ID  - Leukocytosis. hypothermic  - s/p ceftriaxone in the ED   - Continue vanc/zosyn/azitho  - F/u cultures     # DVT ppx  - SCD

## 2019-06-07 NOTE — PROGRESS NOTE ADULT - SUBJECTIVE AND OBJECTIVE BOX
INTERVAL HPI/OVERNIGHT EVENTS:    SUBJECTIVE: Patient seen and examined at bedside.     CONSTITUTIONAL: No weakness, fevers or chills  EYES/ENT: No visual changes;  No vertigo or throat pain   NECK: No pain or stiffness  RESPIRATORY: No cough, wheezing, hemoptysis; No shortness of breath  CARDIOVASCULAR: No chest pain or palpitations  GASTROINTESTINAL: No abdominal or epigastric pain. No nausea, vomiting, or hematemesis; No diarrhea or constipation. No melena or hematochezia.  GENITOURINARY: No dysuria, frequency or hematuria  NEUROLOGICAL: No numbness or weakness  SKIN: No itching, rashes    OBJECTIVE:    VITAL SIGNS:  ICU Vital Signs Last 24 Hrs  T(C): 36.7 (2019 06:00), Max: 36.7 (2019 06:00)  T(F): 98.1 (2019 06:00), Max: 98.1 (2019 06:00)  HR: 62 (2019 07:14) (44 - 75)  BP: 126/61 (2019 07:00) (79/37 - 155/65)  BP(mean): 82 (2019 07:00) (64 - 84)  ABP: --  ABP(mean): --  RR: 9 (2019 07:00) (9 - 20)  SpO2: 100% (2019 07:14) (99% - 100%)    Mode: AC/ CMV (Assist Control/ Continuous Mandatory Ventilation), RR (machine): 9, TV (machine): 400, FiO2: 30, PEEP: 5, ITime: 0.71, MAP: 7, PIP: 22    - @ 07:01  -   @ 07:00  --------------------------------------------------------  IN: 686.4 mL / OUT: 814 mL / NET: -127.6 mL      CAPILLARY BLOOD GLUCOSE      POCT Blood Glucose.: 100 mg/dL (2019 15:45)      PHYSICAL EXAM:    General: NAD  HEENT: NC/AT; PERRL, clear conjunctiva  Neck: supple  Respiratory: CTA b/l  Cardiovascular: +S1/S2; RRR  Abdomen: soft, NT/ND; +BS x4  Extremities: WWP, 2+ peripheral pulses b/l; no LE edema  Skin: normal color and turgor; no rash  Neurological:    MEDICATIONS:  MEDICATIONS  (STANDING):  azithromycin  IVPB 500 milliGRAM(s) IV Intermittent every 24 hours  chlorhexidine 4% Liquid 1 Application(s) Topical <User Schedule>  fentaNYL   Infusion. 0.5 MICROgram(s)/kG/Hr (3.18 mL/Hr) IV Continuous <Continuous>  heparin  Injectable 5000 Unit(s) SubCutaneous every 8 hours  hydrocortisone sodium succinate Injectable 100 milliGRAM(s) IV Push every 8 hours  midazolam Infusion 0.02 mG/kG/Hr (1.272 mL/Hr) IV Continuous <Continuous>  norepinephrine Infusion 0.05 MICROgram(s)/kG/Min (5.963 mL/Hr) IV Continuous <Continuous>  pantoprazole  Injectable 40 milliGRAM(s) IV Push daily  petrolatum Ophthalmic Ointment 1 Application(s) Both EYES two times a day  piperacillin/tazobactam IVPB. 3.375 Gram(s) IV Intermittent every 8 hours  vancomycin  IVPB 1000 milliGRAM(s) IV Intermittent every 12 hours    MEDICATIONS  (PRN):      ALLERGIES:  Allergies    No Known Allergies    Intolerances        LABS:                        9.5    3.35  )-----------( 55       ( 2019 04:10 )             30.5     06-07    142  |  106  |  21  ----------------------------<  163<H>  3.6   |  26  |  0.56    Ca    7.8<L>      2019 04:10  Phos  1.5     06-07  Mg     1.6     06-07    TPro  5.1<L>  /  Alb  2.8<L>  /  TBili  0.3  /  DBili  x   /  AST  21  /  ALT  14  /  AlkPhos  47  06-07      Urinalysis Basic - ( 2019 18:38 )    Color: LIGHT YELLOW / Appearance: CLEAR / S.023 / pH: 6.0  Gluc: 150 / Ketone: TRACE  / Bili: NEGATIVE / Urobili: NORMAL   Blood: NEGATIVE / Protein: NEGATIVE / Nitrite: NEGATIVE   Leuk Esterase: NEGATIVE / RBC: x / WBC x   Sq Epi: x / Non Sq Epi: x / Bacteria: x        RADIOLOGY & ADDITIONAL TESTS: Reviewed. INTERVAL HPI/OVERNIGHT EVENTS:    SUBJECTIVE: Patient seen and examined at bedside. Intubated and sedated. Unable to assess full ROS due to clinical status.  OBJECTIVE:    VITAL SIGNS:  ICU Vital Signs Last 24 Hrs  T(C): 36.7 (2019 06:00), Max: 36.7 (2019 06:00)  T(F): 98.1 (2019 06:00), Max: 98.1 (2019 06:00)  HR: 62 (2019 07:14) (44 - 75)  BP: 126/61 (2019 07:00) (79/37 - 155/65)  BP(mean): 82 (2019 07:00) (64 - 84)  ABP: --  ABP(mean): --  RR: 9 (2019 07:00) (9 - 20)  SpO2: 100% (2019 07:14) (99% - 100%)    Mode: AC/ CMV (Assist Control/ Continuous Mandatory Ventilation), RR (machine): 9, TV (machine): 400, FiO2: 30, PEEP: 5, ITime: 0.71, MAP: 7, PIP: 22    06-06 @ 07:01  -  06-07 @ 07:00  --------------------------------------------------------  IN: 686.4 mL / OUT: 814 mL / NET: -127.6 mL      CAPILLARY BLOOD GLUCOSE      POCT Blood Glucose.: 100 mg/dL (2019 15:45)      PHYSICAL EXAM:    General: NAD  HEENT: NC/AT; PERRL, clear conjunctiva  Neck: supple  Respiratory: CTA b/l  Cardiovascular: +S1/S2; RRR  Abdomen: soft, NT/ND; +BS x4  Extremities: WWP, 2+ peripheral pulses b/l; no LE edema  Skin: normal color and turgor; no rash  Neurological:    MEDICATIONS:  MEDICATIONS  (STANDING):  azithromycin  IVPB 500 milliGRAM(s) IV Intermittent every 24 hours  chlorhexidine 4% Liquid 1 Application(s) Topical <User Schedule>  fentaNYL   Infusion. 0.5 MICROgram(s)/kG/Hr (3.18 mL/Hr) IV Continuous <Continuous>  heparin  Injectable 5000 Unit(s) SubCutaneous every 8 hours  hydrocortisone sodium succinate Injectable 100 milliGRAM(s) IV Push every 8 hours  midazolam Infusion 0.02 mG/kG/Hr (1.272 mL/Hr) IV Continuous <Continuous>  norepinephrine Infusion 0.05 MICROgram(s)/kG/Min (5.963 mL/Hr) IV Continuous <Continuous>  pantoprazole  Injectable 40 milliGRAM(s) IV Push daily  petrolatum Ophthalmic Ointment 1 Application(s) Both EYES two times a day  piperacillin/tazobactam IVPB. 3.375 Gram(s) IV Intermittent every 8 hours  vancomycin  IVPB 1000 milliGRAM(s) IV Intermittent every 12 hours    MEDICATIONS  (PRN):      ALLERGIES:  Allergies    No Known Allergies    Intolerances        LABS:                        9.5    3.35  )-----------( 55       ( 2019 04:10 )             30.5     06-07    142  |  106  |  21  ----------------------------<  163<H>  3.6   |  26  |  0.56    Ca    7.8<L>      2019 04:10  Phos  1.5     06-07  Mg     1.6     06-07    TPro  5.1<L>  /  Alb  2.8<L>  /  TBili  0.3  /  DBili  x   /  AST  21  /  ALT  14  /  AlkPhos  47  06-07      Urinalysis Basic - ( 2019 18:38 )    Color: LIGHT YELLOW / Appearance: CLEAR / S.023 / pH: 6.0  Gluc: 150 / Ketone: TRACE  / Bili: NEGATIVE / Urobili: NORMAL   Blood: NEGATIVE / Protein: NEGATIVE / Nitrite: NEGATIVE   Leuk Esterase: NEGATIVE / RBC: x / WBC x   Sq Epi: x / Non Sq Epi: x / Bacteria: x        RADIOLOGY & ADDITIONAL TESTS: Reviewed.

## 2019-06-07 NOTE — PROGRESS NOTE ADULT - ATTENDING COMMENTS
69 year old woman with schizoaffective disorder, dementia, HTN, DM2, brought in by family with weakness and AMS, found to be hypothermic, bradycardic and hypoglycemic etiology thought to be sepsis +/- adrenal insufficiency    - vital signs improved with antibiotics and stress dose steroids  - possible medication causing adrenal insufficieny, endocrine evaluation  - wean off sedation to assess mental status  - follow up cultures, continue stress dose steroids  - also pancytopenia likely medication effect    condition guarded  cc time spent 40 min   case discussed with family at bedside

## 2019-06-08 DIAGNOSIS — E11.65 TYPE 2 DIABETES MELLITUS WITH HYPERGLYCEMIA: ICD-10-CM

## 2019-06-08 DIAGNOSIS — E27.49 OTHER ADRENOCORTICAL INSUFFICIENCY: ICD-10-CM

## 2019-06-08 LAB
ALBUMIN SERPL ELPH-MCNC: 3.1 G/DL — LOW (ref 3.3–5)
ALP SERPL-CCNC: 47 U/L — SIGNIFICANT CHANGE UP (ref 40–120)
ALT FLD-CCNC: 10 U/L — SIGNIFICANT CHANGE UP (ref 4–33)
AMMONIA BLD-MCNC: 32 UMOL/L — SIGNIFICANT CHANGE UP (ref 11–55)
ANION GAP SERPL CALC-SCNC: 13 MMO/L — SIGNIFICANT CHANGE UP (ref 7–14)
ANISOCYTOSIS BLD QL: SLIGHT — SIGNIFICANT CHANGE UP
AST SERPL-CCNC: 20 U/L — SIGNIFICANT CHANGE UP (ref 4–32)
BACTERIA UR CULT: SIGNIFICANT CHANGE UP
BASE EXCESS BLDA CALC-SCNC: 4.4 MMOL/L — SIGNIFICANT CHANGE UP
BASOPHILS # BLD AUTO: 0.02 K/UL — SIGNIFICANT CHANGE UP (ref 0–0.2)
BASOPHILS NFR BLD AUTO: 0.3 % — SIGNIFICANT CHANGE UP (ref 0–2)
BASOPHILS NFR SPEC: 0 % — SIGNIFICANT CHANGE UP (ref 0–2)
BILIRUB SERPL-MCNC: 0.2 MG/DL — SIGNIFICANT CHANGE UP (ref 0.2–1.2)
BLOOD GAS ARTERIAL - FIO2: 30 — SIGNIFICANT CHANGE UP
BUN SERPL-MCNC: 23 MG/DL — SIGNIFICANT CHANGE UP (ref 7–23)
CALCIUM SERPL-MCNC: 8.6 MG/DL — SIGNIFICANT CHANGE UP (ref 8.4–10.5)
CHLORIDE BLDA-SCNC: 108 MMOL/L — SIGNIFICANT CHANGE UP (ref 96–108)
CHLORIDE SERPL-SCNC: 105 MMOL/L — SIGNIFICANT CHANGE UP (ref 98–107)
CHOLEST SERPL-MCNC: 151 MG/DL — SIGNIFICANT CHANGE UP (ref 120–199)
CO2 SERPL-SCNC: 25 MMOL/L — SIGNIFICANT CHANGE UP (ref 22–31)
CREAT SERPL-MCNC: 1.21 MG/DL — SIGNIFICANT CHANGE UP (ref 0.5–1.3)
EOSINOPHIL # BLD AUTO: 0.21 K/UL — SIGNIFICANT CHANGE UP (ref 0–0.5)
EOSINOPHIL NFR BLD AUTO: 3.1 % — SIGNIFICANT CHANGE UP (ref 0–6)
EOSINOPHIL NFR FLD: 0 % — SIGNIFICANT CHANGE UP (ref 0–6)
GLUCOSE BLDA-MCNC: 177 MG/DL — HIGH (ref 70–99)
GLUCOSE SERPL-MCNC: 226 MG/DL — HIGH (ref 70–99)
HBA1C BLD-MCNC: 8.9 % — HIGH (ref 4–5.6)
HCO3 BLDA-SCNC: 28 MMOL/L — HIGH (ref 22–26)
HCT VFR BLD CALC: 39 % — SIGNIFICANT CHANGE UP (ref 34.5–45)
HCT VFR BLDA CALC: 32.6 % — LOW (ref 34.5–46.5)
HDLC SERPL-MCNC: 76 MG/DL — HIGH (ref 45–65)
HGB BLD-MCNC: 11.8 G/DL — SIGNIFICANT CHANGE UP (ref 11.5–15.5)
HGB BLDA-MCNC: 10.6 G/DL — LOW (ref 11.5–15.5)
IMM GRANULOCYTES NFR BLD AUTO: 1 % — SIGNIFICANT CHANGE UP (ref 0–1.5)
LACTATE BLDA-SCNC: 1 MMOL/L — SIGNIFICANT CHANGE UP (ref 0.5–2)
LIPID PNL WITH DIRECT LDL SERPL: 58 MG/DL — SIGNIFICANT CHANGE UP
LYMPHOCYTES # BLD AUTO: 0.55 K/UL — LOW (ref 1–3.3)
LYMPHOCYTES # BLD AUTO: 8.2 % — LOW (ref 13–44)
LYMPHOCYTES NFR SPEC AUTO: 10 % — LOW (ref 13–44)
MACROCYTES BLD QL: SLIGHT — SIGNIFICANT CHANGE UP
MAGNESIUM SERPL-MCNC: 1.7 MG/DL — SIGNIFICANT CHANGE UP (ref 1.6–2.6)
MANUAL SMEAR VERIFICATION: SIGNIFICANT CHANGE UP
MCHC RBC-ENTMCNC: 29.2 PG — SIGNIFICANT CHANGE UP (ref 27–34)
MCHC RBC-ENTMCNC: 30.3 % — LOW (ref 32–36)
MCV RBC AUTO: 96.5 FL — SIGNIFICANT CHANGE UP (ref 80–100)
MONOCYTES # BLD AUTO: 0.76 K/UL — SIGNIFICANT CHANGE UP (ref 0–0.9)
MONOCYTES NFR BLD AUTO: 11.4 % — SIGNIFICANT CHANGE UP (ref 2–14)
MONOCYTES NFR BLD: 5 % — SIGNIFICANT CHANGE UP (ref 2–9)
MYELOCYTES NFR BLD: 1 % — HIGH (ref 0–0)
NEUTROPHIL AB SER-ACNC: 83 % — HIGH (ref 43–77)
NEUTROPHILS # BLD AUTO: 5.07 K/UL — SIGNIFICANT CHANGE UP (ref 1.8–7.4)
NEUTROPHILS NFR BLD AUTO: 76 % — SIGNIFICANT CHANGE UP (ref 43–77)
NRBC # BLD: 0 /100WBC — SIGNIFICANT CHANGE UP
NRBC # FLD: 0.13 K/UL — SIGNIFICANT CHANGE UP (ref 0–0)
NRBC FLD-RTO: 1.9 — SIGNIFICANT CHANGE UP
PCO2 BLDA: 48 MMHG — SIGNIFICANT CHANGE UP (ref 32–48)
PH BLDA: 7.4 PH — SIGNIFICANT CHANGE UP (ref 7.35–7.45)
PHOSPHATE SERPL-MCNC: 5.4 MG/DL — HIGH (ref 2.5–4.5)
PLATELET # BLD AUTO: 35 K/UL — LOW (ref 150–400)
PLATELET COUNT - ESTIMATE: SIGNIFICANT CHANGE UP
PMV BLD: SIGNIFICANT CHANGE UP FL (ref 7–13)
PO2 BLDA: 80 MMHG — LOW (ref 83–108)
POTASSIUM BLDA-SCNC: 3.5 MMOL/L — SIGNIFICANT CHANGE UP (ref 3.4–4.5)
POTASSIUM SERPL-MCNC: 4.3 MMOL/L — SIGNIFICANT CHANGE UP (ref 3.5–5.3)
POTASSIUM SERPL-SCNC: 4.3 MMOL/L — SIGNIFICANT CHANGE UP (ref 3.5–5.3)
PROT SERPL-MCNC: 5.8 G/DL — LOW (ref 6–8.3)
RBC # BLD: 4.04 M/UL — SIGNIFICANT CHANGE UP (ref 3.8–5.2)
RBC # FLD: 14.7 % — HIGH (ref 10.3–14.5)
SAO2 % BLDA: 95.9 % — SIGNIFICANT CHANGE UP (ref 95–99)
SODIUM BLDA-SCNC: 141 MMOL/L — SIGNIFICANT CHANGE UP (ref 136–146)
SODIUM SERPL-SCNC: 143 MMOL/L — SIGNIFICANT CHANGE UP (ref 135–145)
SPECIMEN SOURCE: SIGNIFICANT CHANGE UP
TRIGL SERPL-MCNC: 71 MG/DL — SIGNIFICANT CHANGE UP (ref 10–149)
VALPROATE SERPL-MCNC: 14.4 UG/ML — LOW (ref 50–100)
VARIANT LYMPHS # BLD: 1 % — SIGNIFICANT CHANGE UP
WBC # BLD: 6.68 K/UL — SIGNIFICANT CHANGE UP (ref 3.8–10.5)
WBC # FLD AUTO: 6.68 K/UL — SIGNIFICANT CHANGE UP (ref 3.8–10.5)

## 2019-06-08 PROCEDURE — 99223 1ST HOSP IP/OBS HIGH 75: CPT

## 2019-06-08 PROCEDURE — 99291 CRITICAL CARE FIRST HOUR: CPT

## 2019-06-08 RX ORDER — DIVALPROEX SODIUM 500 MG/1
3 TABLET, DELAYED RELEASE ORAL
Qty: 0 | Refills: 0 | DISCHARGE

## 2019-06-08 RX ORDER — VALPROIC ACID (AS SODIUM SALT) 250 MG/5ML
500 SOLUTION, ORAL ORAL THREE TIMES A DAY
Refills: 0 | Status: DISCONTINUED | OUTPATIENT
Start: 2019-06-08 | End: 2019-06-12

## 2019-06-08 RX ORDER — RISPERIDONE 4 MG/1
3 TABLET ORAL
Refills: 0 | Status: DISCONTINUED | OUTPATIENT
Start: 2019-06-08 | End: 2019-06-08

## 2019-06-08 RX ORDER — RISPERIDONE 4 MG/1
3 TABLET ORAL
Refills: 0 | Status: DISCONTINUED | OUTPATIENT
Start: 2019-06-08 | End: 2019-06-12

## 2019-06-08 RX ORDER — DIVALPROEX SODIUM 500 MG/1
1500 TABLET, DELAYED RELEASE ORAL AT BEDTIME
Refills: 0 | Status: DISCONTINUED | OUTPATIENT
Start: 2019-06-08 | End: 2019-06-08

## 2019-06-08 RX ORDER — CITALOPRAM 10 MG/1
20 TABLET, FILM COATED ORAL DAILY
Refills: 0 | Status: DISCONTINUED | OUTPATIENT
Start: 2019-06-08 | End: 2019-06-08

## 2019-06-08 RX ORDER — TRAZODONE HCL 50 MG
0.5 TABLET ORAL
Qty: 0 | Refills: 0 | DISCHARGE

## 2019-06-08 RX ORDER — BENZTROPINE MESYLATE 1 MG
0.5 TABLET ORAL
Qty: 0 | Refills: 0 | DISCHARGE

## 2019-06-08 RX ORDER — MIDAZOLAM HYDROCHLORIDE 1 MG/ML
4 INJECTION, SOLUTION INTRAMUSCULAR; INTRAVENOUS ONCE
Refills: 0 | Status: DISCONTINUED | OUTPATIENT
Start: 2019-06-08 | End: 2019-06-08

## 2019-06-08 RX ORDER — HYDRALAZINE HCL 50 MG
10 TABLET ORAL THREE TIMES A DAY
Refills: 0 | Status: DISCONTINUED | OUTPATIENT
Start: 2019-06-08 | End: 2019-06-10

## 2019-06-08 RX ORDER — CITALOPRAM 10 MG/1
20 TABLET, FILM COATED ORAL DAILY
Refills: 0 | Status: DISCONTINUED | OUTPATIENT
Start: 2019-06-08 | End: 2019-06-12

## 2019-06-08 RX ORDER — CHLORHEXIDINE GLUCONATE 213 G/1000ML
15 SOLUTION TOPICAL
Refills: 0 | Status: DISCONTINUED | OUTPATIENT
Start: 2019-06-08 | End: 2019-06-12

## 2019-06-08 RX ORDER — BENZTROPINE MESYLATE 1 MG
0.5 TABLET ORAL
Refills: 0 | Status: DISCONTINUED | OUTPATIENT
Start: 2019-06-08 | End: 2019-06-12

## 2019-06-08 RX ORDER — BENZTROPINE MESYLATE 1 MG
1 TABLET ORAL
Qty: 0 | Refills: 0 | DISCHARGE

## 2019-06-08 RX ADMIN — MIDAZOLAM HYDROCHLORIDE 4 MILLIGRAM(S): 1 INJECTION, SOLUTION INTRAMUSCULAR; INTRAVENOUS at 21:20

## 2019-06-08 RX ADMIN — PANTOPRAZOLE SODIUM 40 MILLIGRAM(S): 20 TABLET, DELAYED RELEASE ORAL at 11:43

## 2019-06-08 RX ADMIN — FENTANYL CITRATE 3.18 MICROGRAM(S)/KG/HR: 50 INJECTION INTRAVENOUS at 08:25

## 2019-06-08 RX ADMIN — FENTANYL CITRATE 3.18 MICROGRAM(S)/KG/HR: 50 INJECTION INTRAVENOUS at 18:31

## 2019-06-08 RX ADMIN — CHLORHEXIDINE GLUCONATE 15 MILLILITER(S): 213 SOLUTION TOPICAL at 17:53

## 2019-06-08 RX ADMIN — PIPERACILLIN AND TAZOBACTAM 25 GRAM(S): 4; .5 INJECTION, POWDER, LYOPHILIZED, FOR SOLUTION INTRAVENOUS at 05:13

## 2019-06-08 RX ADMIN — Medication 250 MILLIGRAM(S): at 02:36

## 2019-06-08 RX ADMIN — Medication 1 APPLICATION(S): at 07:00

## 2019-06-08 RX ADMIN — PIPERACILLIN AND TAZOBACTAM 25 GRAM(S): 4; .5 INJECTION, POWDER, LYOPHILIZED, FOR SOLUTION INTRAVENOUS at 22:29

## 2019-06-08 RX ADMIN — Medication 100 MILLIGRAM(S): at 00:50

## 2019-06-08 RX ADMIN — Medication 100 MILLIGRAM(S): at 08:23

## 2019-06-08 RX ADMIN — CITALOPRAM 20 MILLIGRAM(S): 10 TABLET, FILM COATED ORAL at 22:30

## 2019-06-08 RX ADMIN — RISPERIDONE 3 MILLIGRAM(S): 4 TABLET ORAL at 18:22

## 2019-06-08 RX ADMIN — Medication 10 MILLIGRAM(S): at 22:29

## 2019-06-08 RX ADMIN — CHLORHEXIDINE GLUCONATE 15 MILLILITER(S): 213 SOLUTION TOPICAL at 05:13

## 2019-06-08 RX ADMIN — Medication 0.5 MILLIGRAM(S): at 18:22

## 2019-06-08 RX ADMIN — FENTANYL CITRATE 3.18 MICROGRAM(S)/KG/HR: 50 INJECTION INTRAVENOUS at 08:23

## 2019-06-08 RX ADMIN — PIPERACILLIN AND TAZOBACTAM 25 GRAM(S): 4; .5 INJECTION, POWDER, LYOPHILIZED, FOR SOLUTION INTRAVENOUS at 13:22

## 2019-06-08 RX ADMIN — Medication 10 MILLIGRAM(S): at 13:22

## 2019-06-08 RX ADMIN — CHLORHEXIDINE GLUCONATE 1 APPLICATION(S): 213 SOLUTION TOPICAL at 08:25

## 2019-06-08 NOTE — CONSULT NOTE ADULT - SUBJECTIVE AND OBJECTIVE BOX
HPI: 69F PMH HTN, dementia, schizoaffective disorder presents with lethargy and altered mental status. History provided by daughter at bedside. She has been feeling week for the past week and unable to swallow her medications. She was seen at an OSH recently and was started on HCTX for leg swelling. This morning, patient became confused with slurred speech. Patient could not recognize daughter so she was concern and brought her to the ED. Endorses cough for 1 day. Denies fever, SOB, nausea/vomiting, dysuria. No sick contacts, no recent travels.     ED course  - Ceftriaxone 1g  - PCO2 72  - 3L IVF given   - Intubated for hypercarbic respiratory failure (06 Jun 2019 19:53)      PAST MEDICAL & SURGICAL HISTORY:  Hypertension  Diabetes  No significant past surgical history      FAMILY HISTORY:  No pertinent family history in first degree relatives      Social History:  Tobacco: none  ETOH: rare    Outpatient Medications: none with steroids    MEDICATIONS  (STANDING):  chlorhexidine 0.12% Liquid 15 milliLiter(s) Oral Mucosa two times a day  chlorhexidine 4% Liquid 1 Application(s) Topical <User Schedule>  fentaNYL   Infusion. 0.5 MICROgram(s)/kG/Hr (3.18 mL/Hr) IV Continuous <Continuous>  hydrALAZINE 10 milliGRAM(s) Oral three times a day  pantoprazole  Injectable 40 milliGRAM(s) IV Push daily  petrolatum Ophthalmic Ointment 1 Application(s) Both EYES two times a day  piperacillin/tazobactam IVPB. 3.375 Gram(s) IV Intermittent every 8 hours    MEDICATIONS  (PRN):    Allergies  No Known Allergies    Review of Systems:  UNABLE TO OBTAIN    PHYSICAL EXAM:  VITALS: T(C): 36.1 (06-08-19 @ 08:00)  T(F): 96.9 (06-08-19 @ 08:00), Max: 100.2 (06-07-19 @ 16:00)  HR: 95 (06-08-19 @ 11:31) (52 - 95)  BP: 185/87 (06-08-19 @ 11:00) (91/62 - 185/87)  RR:  (9 - 17)  SpO2:  (99% - 100%)  Wt(kg): --  GENERAL: NAD, well-groomed, well-developed  EYES: No proptosis, no lid lag, anicteric  HEENT:  Atraumatic, Normocephalic, moist mucous membranes  THYROID: Normal size, no palpable nodules  RESPIRATORY: Clear to auscultation bilaterally; No rales, rhonchi, wheezing, or rubs  CARDIOVASCULAR: Regular rate and rhythm; No murmurs; no peripheral edema  GI: Soft, nontender, non distended, normal bowel sounds  SKIN: Dry, intact, No rashes or lesions  NEURO: sensation intact, extraocular movements intact, no tremor, normal reflexes  PSYCH: reactive affect, euthymic mood      POCT Blood Glucose.: 163 mg/dL (06-08-19 @ 12:15)  POCT Blood Glucose.: 197 mg/dL (06-08-19 @ 05:21)  POCT Blood Glucose.: 141 mg/dL (06-08-19 @ 00:37)  POCT Blood Glucose.: 115 mg/dL (06-07-19 @ 17:44)  POCT Blood Glucose.: 143 mg/dL (06-07-19 @ 13:21)  POCT Blood Glucose.: 100 mg/dL (06-06-19 @ 15:45)                            11.8   6.68  )-----------( 35       ( 08 Jun 2019 05:20 )             39.0       06-08    143  |  105  |  23  ----------------------------<  226<H>  4.3   |  25  |  1.21    EGFR if : 53  EGFR if non : 45    Ca    8.6      06-08  Mg     1.7     06-08  Phos  5.4     06-08    TPro  5.8<L>  /  Alb  3.1<L>  /  TBili  0.2  /  DBili  x   /  AST  20  /  ALT  10  /  AlkPhos  47  06-08  Thyroid Function Tests:  06-06 @ 17:06 TSH 3.70   Hemoglobin A1C, Whole Blood: 8.9 % <H> [4.0 - 5.6] (06-08-19 @ 05:20)      06-08 Chol 151 LDL 58 HDL 76<H> Trig 71 HPI: 70 yo female with history of T2DM uncontrolled (HbA1c 8.9%) without known complications, schizoaffective d/o and hypertension who presents with a week of lethargy, endocrine consulted for a low cortisol. The history was obtained from her daughter Angi as the patient is currently sedated and intubated A week ago her other daughter Jasbir (with whom she lives) took her to an OSH for lethargy. She was given HCTZ and told to f/u with her PCP. She saw her PCP Dr. Cm a few days later and he told her to continue her HCTZ. Despite this she continued to be drowsy.   Her daughter denies any steroid use: inhaled, topical, oral or injections. Her initial cortisol was 5 but it was done at 12am. She was subsequently stimed and came up to 12, but it is unclear if it was done correctly. She is currenly on HC 100IVP q8 in the setting of urosepsis.       PAST MEDICAL & SURGICAL HISTORY:  Hypertension  Diabetes  Schizoaffective d/o  No significant past surgical history      FAMILY HISTORY:  DM: paternal GM, brother (both LE amputees)  Schizoaffective: maternal cousin, maternal aunt      Social History:  Tobacco: none  ETOH: rare    Outpatient Medications: none with steroids    MEDICATIONS  (STANDING):  chlorhexidine 0.12% Liquid 15 milliLiter(s) Oral Mucosa two times a day  chlorhexidine 4% Liquid 1 Application(s) Topical <User Schedule>  fentaNYL   Infusion. 0.5 MICROgram(s)/kG/Hr (3.18 mL/Hr) IV Continuous <Continuous>  hydrALAZINE 10 milliGRAM(s) Oral three times a day  pantoprazole  Injectable 40 milliGRAM(s) IV Push daily  petrolatum Ophthalmic Ointment 1 Application(s) Both EYES two times a day  piperacillin/tazobactam IVPB. 3.375 Gram(s) IV Intermittent every 8 hours    MEDICATIONS  (PRN):    Allergies  No Known Allergies    Review of Systems:  UNABLE TO OBTAIN    PHYSICAL EXAM:  VITALS: T(C): 36.1 (06-08-19 @ 08:00)  T(F): 96.9 (06-08-19 @ 08:00), Max: 100.2 (06-07-19 @ 16:00)  HR: 95 (06-08-19 @ 11:31) (52 - 95)  BP: 185/87 (06-08-19 @ 11:00) (91/62 - 185/87)  RR:  (9 - 17)  SpO2:  (99% - 100%)  Wt(kg): --  GENERAL: +intubated and sedated  HEENT:  Atraumatic, Normocephalic, moist mucous membranes  THYROID: Unable to assess  RESPIRATORY: Clear to auscultation bilaterally; No rales, rhonchi, wheezing, or rubs  CARDIOVASCULAR: Regular rate and rhythm; No murmurs  GI: Soft, nontender, non distended, normal bowel sounds  SKIN: Dry, intact, No rashes or lesions noted on feet  PSYCH: +sedated, +psychomotor retardation      POCT Blood Glucose.: 163 mg/dL (06-08-19 @ 12:15)  POCT Blood Glucose.: 197 mg/dL (06-08-19 @ 05:21)  POCT Blood Glucose.: 141 mg/dL (06-08-19 @ 00:37)  POCT Blood Glucose.: 115 mg/dL (06-07-19 @ 17:44)  POCT Blood Glucose.: 143 mg/dL (06-07-19 @ 13:21)  POCT Blood Glucose.: 100 mg/dL (06-06-19 @ 15:45)                            11.8   6.68  )-----------( 35       ( 08 Jun 2019 05:20 )             39.0       06-08    143  |  105  |  23  ----------------------------<  226<H>  4.3   |  25  |  1.21    EGFR if : 53  EGFR if non : 45    Ca    8.6      06-08  Mg     1.7     06-08  Phos  5.4     06-08    TPro  5.8<L>  /  Alb  3.1<L>  /  TBili  0.2  /  DBili  x   /  AST  20  /  ALT  10  /  AlkPhos  47  06-08  Thyroid Function Tests:  06-06 @ 17:06 TSH 3.70   Hemoglobin A1C, Whole Blood: 8.9 % <H> [4.0 - 5.6] (06-08-19 @ 05:20)      06-08 Chol 151 LDL 58 HDL 76<H> Trig 71

## 2019-06-08 NOTE — PROGRESS NOTE ADULT - ATTENDING COMMENTS
pt with adrenal insufficiency - drug-induced? autoimmune? check CT Abd/Pelvis . f/u endocrine consult. sedation vacation, cpap trial for extubation if possible.

## 2019-06-08 NOTE — PROGRESS NOTE ADULT - SUBJECTIVE AND OBJECTIVE BOX
INTERVAL HPI/OVERNIGHT EVENTS:    SUBJECTIVE: Patient seen and examined at bedside.     CONSTITUTIONAL: No weakness, fevers or chills  EYES/ENT: No visual changes;  No vertigo or throat pain   NECK: No pain or stiffness  RESPIRATORY: No cough, wheezing, hemoptysis; No shortness of breath  CARDIOVASCULAR: No chest pain or palpitations  GASTROINTESTINAL: No abdominal or epigastric pain. No nausea, vomiting, or hematemesis; No diarrhea or constipation. No melena or hematochezia.  GENITOURINARY: No dysuria, frequency or hematuria  NEUROLOGICAL: No numbness or weakness  SKIN: No itching, rashes    OBJECTIVE:    VITAL SIGNS:  ICU Vital Signs Last 24 Hrs  T(C): 36.1 (2019 04:00), Max: 37.9 (2019 16:00)  T(F): 97 (2019 04:00), Max: 100.2 (2019 16:00)  HR: 61 (2019 07:16) (46 - 77)  BP: 169/80 (2019 06:00) (91/62 - 177/78)  BP(mean): 105 (2019 06:00) (65 - 108)  ABP: --  ABP(mean): --  RR: 9 (2019 06:00) (9 - 16)  SpO2: 100% (2019 07:16) (99% - 100%)    Mode: AC/ CMV (Assist Control/ Continuous Mandatory Ventilation), RR (machine): 9, TV (machine): 400, FiO2: 30, PEEP: 5, MAP: 8, PIP: 24    06-07 @ 07:01  -  -08 @ 07:00  --------------------------------------------------------  IN: 2022.6 mL / OUT: 738 mL / NET: 1284.6 mL      CAPILLARY BLOOD GLUCOSE      POCT Blood Glucose.: 197 mg/dL (2019 05:21)      PHYSICAL EXAM:    General: NAD  HEENT: NC/AT; PERRL, clear conjunctiva  Neck: supple  Respiratory: CTA b/l  Cardiovascular: +S1/S2; RRR  Abdomen: soft, NT/ND; +BS x4  Extremities: WWP, 2+ peripheral pulses b/l; no LE edema  Skin: normal color and turgor; no rash  Neurological:    MEDICATIONS:  MEDICATIONS  (STANDING):  chlorhexidine 0.12% Liquid 15 milliLiter(s) Oral Mucosa two times a day  chlorhexidine 4% Liquid 1 Application(s) Topical <User Schedule>  fentaNYL   Infusion. 0.5 MICROgram(s)/kG/Hr (3.18 mL/Hr) IV Continuous <Continuous>  hydrocortisone sodium succinate Injectable 100 milliGRAM(s) IV Push every 8 hours  pantoprazole  Injectable 40 milliGRAM(s) IV Push daily  petrolatum Ophthalmic Ointment 1 Application(s) Both EYES two times a day  piperacillin/tazobactam IVPB. 3.375 Gram(s) IV Intermittent every 8 hours  vancomycin  IVPB 1000 milliGRAM(s) IV Intermittent every 12 hours    MEDICATIONS  (PRN):      ALLERGIES:  Allergies    No Known Allergies    Intolerances        LABS:                        11.8   6.68  )-----------( 35       ( 2019 05:20 )             39.0     06-08    143  |  105  |  23  ----------------------------<  226<H>  4.3   |  25  |  1.21    Ca    8.6      2019 05:20  Phos  5.4     06-08  Mg     1.7     06-08    TPro  5.8<L>  /  Alb  3.1<L>  /  TBili  0.2  /  DBili  x   /  AST  20  /  ALT  10  /  AlkPhos  47  06-08      Urinalysis Basic - ( 2019 18:38 )    Color: LIGHT YELLOW / Appearance: CLEAR / S.023 / pH: 6.0  Gluc: 150 / Ketone: TRACE  / Bili: NEGATIVE / Urobili: NORMAL   Blood: NEGATIVE / Protein: NEGATIVE / Nitrite: NEGATIVE   Leuk Esterase: NEGATIVE / RBC: x / WBC x   Sq Epi: x / Non Sq Epi: x / Bacteria: x        RADIOLOGY & ADDITIONAL TESTS: Reviewed. INTERVAL HPI/OVERNIGHT EVENTS:    SUBJECTIVE: No acute events overnight. Patient seen and examined at bedside. Intubated and sedated    OBJECTIVE:    VITAL SIGNS:  ICU Vital Signs Last 24 Hrs  T(C): 36.1 (2019 04:00), Max: 37.9 (2019 16:00)  T(F): 97 (2019 04:00), Max: 100.2 (2019 16:00)  HR: 61 (2019 07:16) (46 - 77)  BP: 169/80 (2019 06:00) (91/62 - 177/78)  BP(mean): 105 (2019 06:00) (65 - 108)  ABP: --  ABP(mean): --  RR: 9 (2019 06:00) (9 - 16)  SpO2: 100% (2019 07:16) (99% - 100%)    Mode: AC/ CMV (Assist Control/ Continuous Mandatory Ventilation), RR (machine): 9, TV (machine): 400, FiO2: 30, PEEP: 5, MAP: 8, PIP: 24    06-07 @ 07:01  -  06-08 @ 07:00  --------------------------------------------------------  IN: 2022.6 mL / OUT: 738 mL / NET: 1284.6 mL      CAPILLARY BLOOD GLUCOSE      POCT Blood Glucose.: 197 mg/dL (2019 05:21)      PHYSICAL EXAM:    General: NAD  HEENT: NC/AT; PERRL, clear conjunctiva  Neck: supple  Respiratory: CTA b/l  Cardiovascular: +S1/S2; RRR  Abdomen: soft, NT/ND; +BS x4  Extremities: WWP, 2+ peripheral pulses b/l; no LE edema  Skin: normal color and turgor; no rash  Neurological:    MEDICATIONS:  MEDICATIONS  (STANDING):  chlorhexidine 0.12% Liquid 15 milliLiter(s) Oral Mucosa two times a day  chlorhexidine 4% Liquid 1 Application(s) Topical <User Schedule>  fentaNYL   Infusion. 0.5 MICROgram(s)/kG/Hr (3.18 mL/Hr) IV Continuous <Continuous>  hydrocortisone sodium succinate Injectable 100 milliGRAM(s) IV Push every 8 hours  pantoprazole  Injectable 40 milliGRAM(s) IV Push daily  petrolatum Ophthalmic Ointment 1 Application(s) Both EYES two times a day  piperacillin/tazobactam IVPB. 3.375 Gram(s) IV Intermittent every 8 hours  vancomycin  IVPB 1000 milliGRAM(s) IV Intermittent every 12 hours    MEDICATIONS  (PRN):      ALLERGIES:  Allergies    No Known Allergies    Intolerances        LABS:                        11.8   6.68  )-----------( 35       ( 2019 05:20 )             39.0     06-08    143  |  105  |  23  ----------------------------<  226<H>  4.3   |  25  |  1.21    Ca    8.6      2019 05:20  Phos  5.4     06-08  Mg     1.7     06-08    TPro  5.8<L>  /  Alb  3.1<L>  /  TBili  0.2  /  DBili  x   /  AST  20  /  ALT  10  /  AlkPhos  47  06-08      Urinalysis Basic - ( 2019 18:38 )    Color: LIGHT YELLOW / Appearance: CLEAR / S.023 / pH: 6.0  Gluc: 150 / Ketone: TRACE  / Bili: NEGATIVE / Urobili: NORMAL   Blood: NEGATIVE / Protein: NEGATIVE / Nitrite: NEGATIVE   Leuk Esterase: NEGATIVE / RBC: x / WBC x   Sq Epi: x / Non Sq Epi: x / Bacteria: x        RADIOLOGY & ADDITIONAL TESTS: Reviewed.

## 2019-06-08 NOTE — CONSULT NOTE ADULT - ASSESSMENT
68 yo female with history of T2DM uncontrolled (HbA1c 8.9%) without known complications, schizoaffective d/o and hypertension who presents with a week of lethargy, endocrine consulted for a low cortisol.

## 2019-06-08 NOTE — CONSULT NOTE ADULT - PROBLEM SELECTOR RECOMMENDATION 2
-start moderate correctional scale q6  -on discharge, will need to restart an oral agent - metformin if CrCl is normal as she tolerated it in the past.  Rylie Bernal MD  [phone]

## 2019-06-08 NOTE — PROGRESS NOTE ADULT - ASSESSMENT
69F PMH HTN, dementia, schizoaffective disorder presents with lethargy and altered mental status. Intubated for hypercarbic respiratory failure     # Neuro:   - History of dementia  - Sedated on propofol    # PSYCH:  - History of schizoaffective disorder    # CV  - History of HTN   - Bradycardia. was on pressor on admission  - Now off pressor     # Resp  - Intubated on 6/6 for hypercarbic respiratory failure    # GI  - No issues   - Tube feed      # Renal  - Cr wnl. No known history of CKD  - Monitor electrolytes and replete PRN    # Endo:  - No history of DM. Monitor FS  - Adrenal insufficiency: Continue hydrocortisone 100mg Q8    # Heme  - pancytopenic. No sign of bleeding  - Transfuse Hb <7    # ID  - Leukocytosis. hypothermic  - s/p ceftriaxone in the ED   - Continue vanc/zosyn/azitho  - F/u cultures     # DVT ppx  - SCD 69F PMH HTN, dementia, schizoaffective disorder presents with lethargy and altered mental status. Intubated for hypercarbic respiratory failure     # Neuro:   - History of dementia  - Sedated on propofol    # PSYCH:  - History of schizoaffective disorder    # CV  - History of HTN   - Bradycardia. was on pressor on admission  - Now off pressor     # Resp  - Intubated on 6/6 for hypercarbic respiratory failure    # GI  - No issues   - Tube feed      # Renal  - Cr wnl. No known history of CKD  - Monitor electrolytes and replete PRN    # Endo:  - No history of DM. Monitor FS  - Adrenal insufficiency: Continue hydrocortisone 100mg Q8  - Monitor FS    # Heme  - pancytopenia improved. Platelet 35. No sign of bleeding  - Transfuse Hb <7    # ID  - Leukocytosis. hypothermic  - s/p ceftriaxone in the ED   - urine legionella negative. Azithromycin d/dexter  - Continue vanc/zosyn  - F/u cultures     # DVT ppx  - SCD 69F PMH HTN, dementia, schizoaffective disorder presents with lethargy and altered mental status. Intubated for hypercarbic respiratory failure     # Neuro:   - History of dementia  - Sedated on propofol    # PSYCH:  - History of schizoaffective disorder  - Restarted home psych meds: Valproic acid and risperidone     # CV  - History of HTN   - Bradycardia. was on pressor on admission  - Now off pressor     # Resp  - Intubated on 6/6 for hypercarbic respiratory failure    # GI  - No issues   - Tube feed      # Renal  - Cr wnl. No known history of CKD  - Monitor electrolytes and replete PRN    # Endo:  - No history of DM. Monitor FS  - Adrenal insufficiency: Stopped hydrocortisone 100mg Q8 per endo recs  - Plan to retest HPA axis in the AM    # Heme  - pancytopenia improved. Platelet 35. No sign of bleeding  - Transfuse Hb <7    # ID  - Leukocytosis. hypothermic  - s/p ceftriaxone in the ED   - urine legionella negative. Azithromycin d/dexter. Vanc d/dexter on 6/8  - F/u cultures   - Continue Zosyn     # DVT ppx  - SCD

## 2019-06-08 NOTE — CONSULT NOTE ADULT - PROBLEM SELECTOR RECOMMENDATION 9
-stop HC and recheck her HPA axis in the am:  check baseline ACTH/am cortisol -> push Cosyntropin 250mcg IVP -> recheck am cortisol in 30 minutes. Do not use a free cortisol as its a poor assay.

## 2019-06-09 LAB
ALBUMIN SERPL ELPH-MCNC: 2.5 G/DL — LOW (ref 3.3–5)
ALP SERPL-CCNC: 39 U/L — LOW (ref 40–120)
ALT FLD-CCNC: 10 U/L — SIGNIFICANT CHANGE UP (ref 4–33)
ANION GAP SERPL CALC-SCNC: 12 MMO/L — SIGNIFICANT CHANGE UP (ref 7–14)
APTT BLD: 28.5 SEC — SIGNIFICANT CHANGE UP (ref 27.5–36.3)
AST SERPL-CCNC: 13 U/L — SIGNIFICANT CHANGE UP (ref 4–32)
BASE EXCESS BLDA CALC-SCNC: 4.8 MMOL/L — SIGNIFICANT CHANGE UP
BASOPHILS # BLD AUTO: 0.01 K/UL — SIGNIFICANT CHANGE UP (ref 0–0.2)
BASOPHILS NFR BLD AUTO: 0.2 % — SIGNIFICANT CHANGE UP (ref 0–2)
BILIRUB SERPL-MCNC: 0.3 MG/DL — SIGNIFICANT CHANGE UP (ref 0.2–1.2)
BLD GP AB SCN SERPL QL: NEGATIVE — SIGNIFICANT CHANGE UP
BUN SERPL-MCNC: 30 MG/DL — HIGH (ref 7–23)
CALCIUM SERPL-MCNC: 8 MG/DL — LOW (ref 8.4–10.5)
CHLORIDE SERPL-SCNC: 107 MMOL/L — SIGNIFICANT CHANGE UP (ref 98–107)
CO2 SERPL-SCNC: 27 MMOL/L — SIGNIFICANT CHANGE UP (ref 22–31)
CORTIS SERPL-MCNC: 10.3 UG/DL — SIGNIFICANT CHANGE UP (ref 2.7–18.4)
CORTIS SERPL-MCNC: 7.5 UG/DL — SIGNIFICANT CHANGE UP (ref 2.7–18.4)
CREAT SERPL-MCNC: 1.39 MG/DL — HIGH (ref 0.5–1.3)
EOSINOPHIL # BLD AUTO: 0 K/UL — SIGNIFICANT CHANGE UP (ref 0–0.5)
EOSINOPHIL NFR BLD AUTO: 0 % — SIGNIFICANT CHANGE UP (ref 0–6)
GLUCOSE SERPL-MCNC: 188 MG/DL — HIGH (ref 70–99)
HCO3 BLDA-SCNC: 30 MMOL/L — HIGH (ref 22–26)
HCT VFR BLD CALC: 30.3 % — LOW (ref 34.5–45)
HGB BLD-MCNC: 9.7 G/DL — LOW (ref 11.5–15.5)
HIV 1+2 AB+HIV1 P24 AG SERPL QL IA: SIGNIFICANT CHANGE UP
IMM GRANULOCYTES NFR BLD AUTO: 0.7 % — SIGNIFICANT CHANGE UP (ref 0–1.5)
INR BLD: 0.96 — SIGNIFICANT CHANGE UP (ref 0.88–1.17)
LYMPHOCYTES # BLD AUTO: 1.28 K/UL — SIGNIFICANT CHANGE UP (ref 1–3.3)
LYMPHOCYTES # BLD AUTO: 28.6 % — SIGNIFICANT CHANGE UP (ref 13–44)
MAGNESIUM SERPL-MCNC: 2 MG/DL — SIGNIFICANT CHANGE UP (ref 1.6–2.6)
MCHC RBC-ENTMCNC: 29.5 PG — SIGNIFICANT CHANGE UP (ref 27–34)
MCHC RBC-ENTMCNC: 32 % — SIGNIFICANT CHANGE UP (ref 32–36)
MCV RBC AUTO: 92.1 FL — SIGNIFICANT CHANGE UP (ref 80–100)
MONOCYTES # BLD AUTO: 0.78 K/UL — SIGNIFICANT CHANGE UP (ref 0–0.9)
MONOCYTES NFR BLD AUTO: 17.4 % — HIGH (ref 2–14)
NEUTROPHILS # BLD AUTO: 2.38 K/UL — SIGNIFICANT CHANGE UP (ref 1.8–7.4)
NEUTROPHILS NFR BLD AUTO: 53.1 % — SIGNIFICANT CHANGE UP (ref 43–77)
NRBC # FLD: 0.02 K/UL — SIGNIFICANT CHANGE UP (ref 0–0)
PCO2 BLDA: 30 MMHG — LOW (ref 32–48)
PH BLDA: 7.57 PH — HIGH (ref 7.35–7.45)
PHOSPHATE SERPL-MCNC: 4.4 MG/DL — SIGNIFICANT CHANGE UP (ref 2.5–4.5)
PLATELET # BLD AUTO: 36 K/UL — LOW (ref 150–400)
PMV BLD: SIGNIFICANT CHANGE UP FL (ref 7–13)
PO2 BLDA: 108 MMHG — SIGNIFICANT CHANGE UP (ref 83–108)
POTASSIUM SERPL-MCNC: 3.6 MMOL/L — SIGNIFICANT CHANGE UP (ref 3.5–5.3)
POTASSIUM SERPL-SCNC: 3.6 MMOL/L — SIGNIFICANT CHANGE UP (ref 3.5–5.3)
PROT SERPL-MCNC: 4.7 G/DL — LOW (ref 6–8.3)
PROTHROM AB SERPL-ACNC: 10.6 SEC — SIGNIFICANT CHANGE UP (ref 9.8–13.1)
RBC # BLD: 3.29 M/UL — LOW (ref 3.8–5.2)
RBC # FLD: 14.6 % — HIGH (ref 10.3–14.5)
RH IG SCN BLD-IMP: POSITIVE — SIGNIFICANT CHANGE UP
SAO2 % BLDA: 96.5 % — SIGNIFICANT CHANGE UP (ref 95–99)
SODIUM SERPL-SCNC: 146 MMOL/L — HIGH (ref 135–145)
WBC # BLD: 4.48 K/UL — SIGNIFICANT CHANGE UP (ref 3.8–10.5)
WBC # FLD AUTO: 4.48 K/UL — SIGNIFICANT CHANGE UP (ref 3.8–10.5)

## 2019-06-09 PROCEDURE — 74170 CT ABD WO CNTRST FLWD CNTRST: CPT | Mod: 26

## 2019-06-09 PROCEDURE — 99291 CRITICAL CARE FIRST HOUR: CPT

## 2019-06-09 RX ORDER — DEXTROSE 50 % IN WATER 50 %
15 SYRINGE (ML) INTRAVENOUS ONCE
Refills: 0 | Status: DISCONTINUED | OUTPATIENT
Start: 2019-06-09 | End: 2019-06-19

## 2019-06-09 RX ORDER — MIDAZOLAM HYDROCHLORIDE 1 MG/ML
4 INJECTION, SOLUTION INTRAMUSCULAR; INTRAVENOUS ONCE
Refills: 0 | Status: DISCONTINUED | OUTPATIENT
Start: 2019-06-09 | End: 2019-06-09

## 2019-06-09 RX ORDER — DEXTROSE 50 % IN WATER 50 %
25 SYRINGE (ML) INTRAVENOUS ONCE
Refills: 0 | Status: DISCONTINUED | OUTPATIENT
Start: 2019-06-09 | End: 2019-06-19

## 2019-06-09 RX ORDER — COSYNTROPIN 0.25 MG/ML
0.25 INJECTION, SOLUTION INTRAVENOUS ONCE
Refills: 0 | Status: COMPLETED | OUTPATIENT
Start: 2019-06-09 | End: 2019-06-09

## 2019-06-09 RX ORDER — INSULIN LISPRO 100/ML
VIAL (ML) SUBCUTANEOUS EVERY 6 HOURS
Refills: 0 | Status: DISCONTINUED | OUTPATIENT
Start: 2019-06-09 | End: 2019-06-12

## 2019-06-09 RX ORDER — COSYNTROPIN 0.25 MG/ML
0.25 INJECTION, SOLUTION INTRAVENOUS ONCE
Refills: 0 | Status: COMPLETED | OUTPATIENT
Start: 2019-06-10 | End: 2019-06-10

## 2019-06-09 RX ORDER — SODIUM CHLORIDE 9 MG/ML
1000 INJECTION, SOLUTION INTRAVENOUS
Refills: 0 | Status: DISCONTINUED | OUTPATIENT
Start: 2019-06-09 | End: 2019-06-19

## 2019-06-09 RX ORDER — DEXAMETHASONE 0.5 MG/5ML
4 ELIXIR ORAL EVERY 6 HOURS
Refills: 0 | Status: DISCONTINUED | OUTPATIENT
Start: 2019-06-09 | End: 2019-06-10

## 2019-06-09 RX ORDER — GLUCAGON INJECTION, SOLUTION 0.5 MG/.1ML
1 INJECTION, SOLUTION SUBCUTANEOUS ONCE
Refills: 0 | Status: DISCONTINUED | OUTPATIENT
Start: 2019-06-09 | End: 2019-06-19

## 2019-06-09 RX ORDER — FENTANYL CITRATE 50 UG/ML
100 INJECTION INTRAVENOUS ONCE
Refills: 0 | Status: DISCONTINUED | OUTPATIENT
Start: 2019-06-09 | End: 2019-06-09

## 2019-06-09 RX ORDER — DEXTROSE 50 % IN WATER 50 %
12.5 SYRINGE (ML) INTRAVENOUS ONCE
Refills: 0 | Status: DISCONTINUED | OUTPATIENT
Start: 2019-06-09 | End: 2019-06-19

## 2019-06-09 RX ORDER — MIDAZOLAM HYDROCHLORIDE 1 MG/ML
2 INJECTION, SOLUTION INTRAMUSCULAR; INTRAVENOUS ONCE
Refills: 0 | Status: DISCONTINUED | OUTPATIENT
Start: 2019-06-09 | End: 2019-06-09

## 2019-06-09 RX ADMIN — PIPERACILLIN AND TAZOBACTAM 25 GRAM(S): 4; .5 INJECTION, POWDER, LYOPHILIZED, FOR SOLUTION INTRAVENOUS at 21:10

## 2019-06-09 RX ADMIN — Medication 4 MILLIGRAM(S): at 23:02

## 2019-06-09 RX ADMIN — RISPERIDONE 3 MILLIGRAM(S): 4 TABLET ORAL at 08:12

## 2019-06-09 RX ADMIN — PANTOPRAZOLE SODIUM 40 MILLIGRAM(S): 20 TABLET, DELAYED RELEASE ORAL at 12:25

## 2019-06-09 RX ADMIN — Medication 4 MILLIGRAM(S): at 12:24

## 2019-06-09 RX ADMIN — RISPERIDONE 3 MILLIGRAM(S): 4 TABLET ORAL at 17:17

## 2019-06-09 RX ADMIN — Medication 1: at 23:49

## 2019-06-09 RX ADMIN — Medication 500 MILLIGRAM(S): at 23:02

## 2019-06-09 RX ADMIN — Medication 500 MILLIGRAM(S): at 08:11

## 2019-06-09 RX ADMIN — CITALOPRAM 20 MILLIGRAM(S): 10 TABLET, FILM COATED ORAL at 12:27

## 2019-06-09 RX ADMIN — COSYNTROPIN 0.25 MILLIGRAM(S): 0.25 INJECTION, SOLUTION INTRAVENOUS at 07:59

## 2019-06-09 RX ADMIN — Medication 500 MILLIGRAM(S): at 00:00

## 2019-06-09 RX ADMIN — CHLORHEXIDINE GLUCONATE 1 APPLICATION(S): 213 SOLUTION TOPICAL at 08:03

## 2019-06-09 RX ADMIN — Medication 10 MILLIGRAM(S): at 17:18

## 2019-06-09 RX ADMIN — FENTANYL CITRATE 100 MICROGRAM(S): 50 INJECTION INTRAVENOUS at 23:51

## 2019-06-09 RX ADMIN — Medication 4 MILLIGRAM(S): at 17:17

## 2019-06-09 RX ADMIN — MIDAZOLAM HYDROCHLORIDE 4 MILLIGRAM(S): 1 INJECTION, SOLUTION INTRAMUSCULAR; INTRAVENOUS at 23:50

## 2019-06-09 RX ADMIN — PIPERACILLIN AND TAZOBACTAM 25 GRAM(S): 4; .5 INJECTION, POWDER, LYOPHILIZED, FOR SOLUTION INTRAVENOUS at 06:00

## 2019-06-09 RX ADMIN — CHLORHEXIDINE GLUCONATE 15 MILLILITER(S): 213 SOLUTION TOPICAL at 05:59

## 2019-06-09 RX ADMIN — PIPERACILLIN AND TAZOBACTAM 25 GRAM(S): 4; .5 INJECTION, POWDER, LYOPHILIZED, FOR SOLUTION INTRAVENOUS at 13:55

## 2019-06-09 RX ADMIN — Medication 500 MILLIGRAM(S): at 15:00

## 2019-06-09 RX ADMIN — MIDAZOLAM HYDROCHLORIDE 2 MILLIGRAM(S): 1 INJECTION, SOLUTION INTRAMUSCULAR; INTRAVENOUS at 18:33

## 2019-06-09 RX ADMIN — Medication 0.5 MILLIGRAM(S): at 17:16

## 2019-06-09 RX ADMIN — CHLORHEXIDINE GLUCONATE 15 MILLILITER(S): 213 SOLUTION TOPICAL at 17:17

## 2019-06-09 RX ADMIN — Medication 0.5 MILLIGRAM(S): at 06:00

## 2019-06-09 NOTE — PROGRESS NOTE ADULT - ATTENDING COMMENTS
repeat cosyntropin stim test tomorrow morning. change hydrocortisone to dexamethasone equivalent dosing. Continue zosyn for empiric coverage.

## 2019-06-09 NOTE — PROGRESS NOTE ADULT - SUBJECTIVE AND OBJECTIVE BOX
CHIEF COMPLAINT: Lethargy    Hospital course: Patient is a 69 year old man with history of HTN, dementia, schizoaffective disorder presented with lethargy and AMS on 06/06. Patient was found to be hypothermic, bradycardia and pancytopenic in the ED, intubated in the ED for hypercarbic respiratory failure. Patient received D50, Epinephrine in the ED and was admitted to the MICU.     Patient was started on broad spectrum Zosyn for sepsis. RVP negative. Blood culture and urine cultures from 6/6 NGTD. Stress dose of Hydrocortisone was started for possible adrenal insufficiency. AM cortisol was found to be low. Endocrine was consulted for adrenal insufficiency.     Interval Events: Hydrocortisone was discontinued yesterday in preparation for cosyntropin test this morning. Patient's HR overnight was 40-50, -116/50-60, hypothermic of 34.8 (94.7).  Patient was restarted on home meds Depakote, Benztropine, Citalopram and Risperidone.     REVIEW OF SYSTEMS:  Patient is intubated and sedated.     OBJECTIVE:  Vital Signs Last 24 Hrs  T(C): 34.8 (09 Jun 2019 04:00), Max: 36.1 (08 Jun 2019 08:00)  T(F): 94.7 (09 Jun 2019 04:00), Max: 96.9 (08 Jun 2019 08:00)  HR: 46 (09 Jun 2019 07:13) (37 - 95)  BP: 116/57 (09 Jun 2019 07:00) (100/48 - 185/87)  BP(mean): 77 (09 Jun 2019 07:00) (64 - 154)  RR: 10 (09 Jun 2019 07:00) (9 - 20)  SpO2: 100% (09 Jun 2019 07:13) (100% - 100%)    06-08 @ 07:01  -  06-09 @ 07:00  --------------------------------------------------------  IN: 616.8 mL / OUT: 1000 mL / NET: -383.2 mL      CAPILLARY BLOOD GLUCOSE      POCT Blood Glucose.: 167 mg/dL (09 Jun 2019 06:29)      PHYSICAL EXAM:  General: Intubated and sedated  Head: Normocephalic   Eyes:  PERRLA, clear conjunctiva. EOMI, no ptosis   Respiratory: Bilateral lung clear to auscultation, no crackles, no wheezes, no rhonchi.   Cardiovascular: S1/S2 auscultated, no murmur, or gallop. Bradycardic  Abdomen: Soft, non-tender, nondistended, no guarding or rebound tenderness. Active bowel sounds in all 4 quadrants.     Extremities: No significant deformity or joint abnormality. Peripheral pulses intact. No peripheral edema   Skin: Intact, no rash   Neurological: Intubated and sedated     HOSPITAL MEDICATIONS:    piperacillin/tazobactam IVPB. 3.375 Gram(s) IV Intermittent every 8 hours    hydrALAZINE 10 milliGRAM(s) Oral three times a day    cosyntropin Injectable 0.25 milliGRAM(s) IV Push once      benztropine 0.5 milliGRAM(s) Oral two times a day  citalopram Solution 20 milliGRAM(s) Enteral Tube daily  fentaNYL   Infusion. 0.5 MICROgram(s)/kG/Hr IV Continuous <Continuous>  risperiDONE   Solution 3 milliGRAM(s) Oral two times a day  valproic  acid Syrup 500 milliGRAM(s) Oral three times a day    pantoprazole  Injectable 40 milliGRAM(s) IV Push daily            chlorhexidine 0.12% Liquid 15 milliLiter(s) Oral Mucosa two times a day  chlorhexidine 4% Liquid 1 Application(s) Topical <User Schedule>  petrolatum Ophthalmic Ointment 1 Application(s) Both EYES two times a day        LABS:                        9.7    4.48  )-----------( 36       ( 09 Jun 2019 03:01 )             30.3     Hgb Trend: 9.7<--, 11.8<--, 9.5<--, 9.1<--, 10.9<--  06-09    146<H>  |  107  |  30<H>  ----------------------------<  188<H>  3.6   |  27  |  1.39<H>    Ca    8.0<L>      09 Jun 2019 03:01  Phos  4.4     06-09  Mg     2.0     06-09    TPro  4.7<L>  /  Alb  2.5<L>  /  TBili  0.3  /  DBili  x   /  AST  13  /  ALT  10  /  AlkPhos  39<L>  06-09    Creatinine Trend: 1.39<--, 1.21<--, 0.56<--, 0.55<--  PT/INR - ( 09 Jun 2019 03:01 )   PT: 10.6 SEC;   INR: 0.96          PTT - ( 09 Jun 2019 03:01 )  PTT:28.5 SEC    Arterial Blood Gas:  06-08 @ 20:47  7.40/48/80/28/95.9/4.4  ABG lactate: 1.0

## 2019-06-09 NOTE — PROGRESS NOTE ADULT - ASSESSMENT
Patient is a 69 year old woman with history of HTN, dementia, schizoaffective disorder presents with lethargy and altered mental status s/p intubation for hypercarbic respiratory failure, also found to have hypothermic, bradycardia and pancytopenia.     # Neuro:   - History of dementia  - Sedated on propofol    # PSYCH:  - History of schizoaffective disorder  - Restarted home psych meds: Valproic acid and risperidone     # CV  - History of HTN   - Bradycardia. was on pressor on admission  - Now off pressor     # Resp  - Intubated on 6/6 for hypercarbic respiratory failure    # GI  - No issues   - Tube feed    # Renal  - Cr wnl. No known history of CKD  - Monitor electrolytes and replete PRN    # Endo:  - No history of DM. Monitor FS  - Adrenal insufficiency: Stopped hydrocortisone 100mg Q8 per endo recs  - Plan to retest HPA axis in the AM    # Heme  - pancytopenia improved. Platelet 35. No sign of bleeding  - Transfuse Hb <7    # ID  - Leukocytosis. hypothermic  - s/p ceftriaxone in the ED   - urine legionella negative. Azithromycin d/dexter. Vanc d/dexter on 6/8  - F/u cultures   - Continue Zosyn     # DVT ppx  - SCD    Henrietta Schreiber PGY-2  Internal medicine MICU Patient is a 69 year old woman with history of HTN, dementia, schizoaffective disorder presents with lethargy and altered mental status s/p intubation for hypercarbic respiratory failure, also found to have hypothermic, bradycardia and pancytopenia likely due to  adrenal insufficiency.      # Neuro:   - History of dementia  - Patient is currently sedated on Fentanyl     # Psych:  - History of schizoaffective disorder  - Home medication include: Citalopram 20 mg QD, Risperidone 3 mg QD, Valproic acid 1500 mg QHS, Benztropine 0.5 mg QD  - Continue with home medication Citalopram 20 mg QD, Risperidone 3 mg QD, Valproic acid 500 mg TID, Benztropine 0.5 mg QD    # CV:  - Currently sinus bradycardia with HR 40-50, hypotensive 110-116/50-60 but off pressor, also hypothermic  - Shock state could be distributive in the setting of sepsis vs adrenal insufficiency   - For sepsis, UA negative, blood culture and urine culture negative, RVP negative, no clear source of infection, continue with empiric coverage with Zosyn for possible aspiration pneumonia for now (6/6-)  - For adrenal insufficiency, cosyntropin stimulation test today, will check cortisol and ACTH level 30 and 60 minutes after 0.25 mg of Cosyntropin   - Continue to monitor hemodynamics     # Resp:  - Intubated on 6/6 for hypercarbic respiratory failure  - Previous chest xray on 06/07 showing small left pleural effusion, left basilar opacity atelectasis vs pneumonia  - Currently on vent setting 400/9/30/5, last ABG on 6/8 showing 7.40/48/80/28  - Daily CPAP    # GI  - No issues   - Continue with PPI IV  - Continue with tube feed    # Renal  - Cr now trending up 0.55 on admission -->1.21-->1.39  - BUN/Cr ratio suggestive of pre-renal etiology   - Could be in the setting of hypoperfusion in the setting of shock state  - Monitor Cr after fluid challenge  - Monitor UOP    # Endo:  Problem: T2DM with A1C 8.9  -   - Adrenal insufficiency: Stopped hydrocortisone 100mg Q8 per endo recs  - Plan to retest HPA axis in the AM    # Heme  - pancytopenia improved. Platelet 35. No sign of bleeding  - Transfuse Hb <7    # ID  - Leukocytosis. hypothermic  - s/p ceftriaxone in the ED   - urine legionella negative. Azithromycin d/dexter. Vanc d/dexter on 6/8  - F/u cultures   - Continue Zosyn     # DVT ppx  - SCD    Henrietta Schreiber PGY-2  Internal medicine MICU Patient is a 69 year old woman with history of HTN, dementia, schizoaffective disorder presents with lethargy and altered mental status s/p intubation for hypercarbic respiratory failure, also found to have hypothermic, bradycardia and pancytopenia likely due to  adrenal insufficiency.      # Neuro:   - History of dementia  - Patient is currently sedated on Fentanyl     # Psych:  - History of schizoaffective disorder  - Home medication include: Citalopram 20 mg QD, Risperidone 3 mg QD, Valproic acid 1500 mg QHS, Benztropine 0.5 mg QD  - Continue with home medication Citalopram 20 mg QD, Risperidone 3 mg QD, Valproic acid 500 mg TID, Benztropine 0.5 mg QD    # CV:  - Currently sinus bradycardia with HR 40-50, hypotensive 110-116/50-60 but off pressor, also hypothermic  - Shock state could be distributive in the setting of sepsis vs adrenal insufficiency   - For sepsis, UA negative, blood culture and urine culture negative, RVP negative, no clear source of infection, continue with empiric coverage with Zosyn for possible aspiration pneumonia for now (6/6-)  - For adrenal insufficiency, cosyntropin stimulation test today, will check cortisol and ACTH level 30 and 60 minutes after 0.25 mg of Cosyntropin   - Continue to monitor hemodynamics     # Resp:  - Intubated on 6/6 for hypercarbic respiratory failure  - Previous chest xray on 06/07 showing small left pleural effusion, left basilar opacity atelectasis vs pneumonia  - Currently on vent setting 400/9/30/5, last ABG on 6/8 showing 7.40/48/80/28  - Daily CPAP    # GI  - No issues   - Continue with PPI IV  - Continue with tube feed    # Renal  - Cr now trending up 0.55 on admission -->1.21-->1.39  - BUN/Cr ratio suggestive of pre-renal etiology   - Could be in the setting of hypoperfusion in the setting of shock state  - Monitor Cr after fluid challenge  - Monitor UOP    # Endo:  Problem: T2DM with A1C 8.9  - Not on home medications   - Continue to monitor FS Q6H  - ISS Q6H    Problem: Adrenal insufficiency     - Adrenal insufficiency: Stopped hydrocortisone 100mg Q8 per endo recs  - Plan to retest HPA axis in the AM    # Heme  - Pancytopenia on admission   - Leukopenia now resolve  - Hb now stable at 9.7 improved. Platelet 35. No sign of bleeding  - Transfuse Hb <7    # ID  - Leukocytosis. hypothermic  - s/p ceftriaxone in the ED   - urine legionella negative. Azithromycin d/dexter. Vanc d/dexter on 6/8  - F/u cultures   - Continue Zosyn     # DVT ppx  - SCD    Henrietta Schreiber PGY-2  Internal medicine MICU Patient is a 69 year old woman with history of HTN, dementia, schizoaffective disorder presents with lethargy and altered mental status s/p intubation for hypercarbic respiratory failure, also found to have hypothermic, bradycardia and pancytopenia likely due to  adrenal insufficiency.      # Neuro:   - History of dementia  - Patient is currently sedated on Fentanyl     # Psych:  - History of schizoaffective disorder  - Home medication include: Citalopram 20 mg QD, Risperidone 3 mg QD, Valproic acid 1500 mg QHS, Benztropine 0.5 mg QD  - Continue with home medication Citalopram 20 mg QD, Risperidone 3 mg QD, Valproic acid 500 mg TID, Benztropine 0.5 mg QD    # CV:  - Currently sinus bradycardia with HR 40-50, hypotensive 110-116/50-60 but off pressor, also hypothermic  - Shock state could be distributive in the setting of sepsis vs adrenal insufficiency   - For sepsis, UA negative, blood culture and urine culture negative, RVP negative, no clear source of infection, continue with empiric coverage with Zosyn for possible aspiration pneumonia for now (6/6-)  - For adrenal insufficiency, cosyntropin stimulation test today, will check cortisol and ACTH level 30 and 60 minutes after 0.25 mg of Cosyntropin   - Continue to monitor hemodynamics     # Resp:  - Intubated on 6/6 for hypercarbic respiratory failure  - Previous chest xray on 06/07 showing small left pleural effusion, left basilar opacity atelectasis vs pneumonia  - Currently on vent setting 400/9/30/5, last ABG on 6/8 showing 7.40/48/80/28  - Daily CPAP    # GI  - No issues   - Continue with PPI IV  - Continue with tube feed    # Renal  - Cr now trending up 0.55 on admission -->1.21-->1.39  - BUN/Cr ratio suggestive of pre-renal etiology   - Could be in the setting of hypoperfusion in the setting of shock state  - Monitor Cr after fluid challenge  - Monitor UOP    # Endo:  Problem: T2DM with A1C 8.9  - Not on home medications   - Continue to monitor FS Q6H  - ISS Q6H    Problem: Adrenal insufficiency   - Patient presented with hypothermia, bradycardia, and hypotension suspicious for adrenal insufficiency   - AM cortisol was 5.9  - Hydrocortisone 100mg Q8 now discontinued in preparation for cosyntropin test today   - If patient has primary adrenal insufficiency, endogenous ACTH will be elevated and there will be minimum response of cortisol to cosyntropin  - If patient has hypopituitarism with deficient in ACTH secretion and secondary adrenal insufficiency, cortisol will respond to cosyntropin  - Endocrine recs appreciated     # Heme  - Pancytopenia on admission   - For leukopenia, resolved  - For anemia, Hb now stable at 9.7, no signs of active bleeding, will check iron study, retic count for further evaluation   - For thrombocytopenia, platelet now downtrending 35 K, no petechiae no gingival bleeding, etiology of thrombocytopenia could be in the setting of sepsis vs medication (Depakote)  - Continue to monitor CBC    # ID  - Leukopenia now resolved  - s/p ceftriaxone in the ED   - Urine legionella negative, Azithromycin and Vancomycin discontinued on 6/8  - Continue with Zosyn for aspiration pneumonia (6/6-)    # DVT ppx  - SCD (no pharm VTE due to thrombocytopenia)    Henrietta Schreiber PGY-2  Internal medicine MICU

## 2019-06-10 LAB
ACTH SER-ACNC: 17.5 PG/ML — SIGNIFICANT CHANGE UP (ref 7.2–63.3)
ALBUMIN SERPL ELPH-MCNC: 3.2 G/DL — LOW (ref 3.3–5)
ALP SERPL-CCNC: 47 U/L — SIGNIFICANT CHANGE UP (ref 40–120)
ALT FLD-CCNC: 10 U/L — SIGNIFICANT CHANGE UP (ref 4–33)
ANION GAP SERPL CALC-SCNC: 15 MMO/L — HIGH (ref 7–14)
AST SERPL-CCNC: 11 U/L — SIGNIFICANT CHANGE UP (ref 4–32)
BASE EXCESS BLDA CALC-SCNC: 7.4 MMOL/L — SIGNIFICANT CHANGE UP
BASOPHILS # BLD AUTO: 0.02 K/UL — SIGNIFICANT CHANGE UP (ref 0–0.2)
BASOPHILS NFR BLD AUTO: 0.4 % — SIGNIFICANT CHANGE UP (ref 0–2)
BILIRUB SERPL-MCNC: 0.3 MG/DL — SIGNIFICANT CHANGE UP (ref 0.2–1.2)
BLOOD GAS ARTERIAL - FIO2: 50 — SIGNIFICANT CHANGE UP
BUN SERPL-MCNC: 33 MG/DL — HIGH (ref 7–23)
CA-I BLDA-SCNC: 1.18 MMOL/L — SIGNIFICANT CHANGE UP (ref 1.15–1.29)
CALCIUM SERPL-MCNC: 8.5 MG/DL — SIGNIFICANT CHANGE UP (ref 8.4–10.5)
CHLORIDE SERPL-SCNC: 104 MMOL/L — SIGNIFICANT CHANGE UP (ref 98–107)
CO2 SERPL-SCNC: 28 MMOL/L — SIGNIFICANT CHANGE UP (ref 22–31)
CORTIS SERPL-MCNC: 24.4 UG/DL — HIGH (ref 2.7–18.4)
CORTIS SERPL-MCNC: 31.1 UG/DL — HIGH (ref 2.7–18.4)
CORTIS SERPL-MCNC: 6.1 UG/DL — SIGNIFICANT CHANGE UP (ref 2.7–18.4)
CREAT SERPL-MCNC: 1.31 MG/DL — HIGH (ref 0.5–1.3)
EOSINOPHIL # BLD AUTO: 0 K/UL — SIGNIFICANT CHANGE UP (ref 0–0.5)
EOSINOPHIL NFR BLD AUTO: 0 % — SIGNIFICANT CHANGE UP (ref 0–6)
FERRITIN SERPL-MCNC: 148.8 NG/ML — SIGNIFICANT CHANGE UP (ref 15–150)
GLUCOSE BLDA-MCNC: 218 MG/DL — HIGH (ref 70–99)
GLUCOSE SERPL-MCNC: 250 MG/DL — HIGH (ref 70–99)
HCO3 BLDA-SCNC: 31 MMOL/L — HIGH (ref 22–26)
HCT VFR BLD CALC: 34.4 % — LOW (ref 34.5–45)
HCT VFR BLDA CALC: 32.9 % — LOW (ref 34.5–46.5)
HGB BLD-MCNC: 10.9 G/DL — LOW (ref 11.5–15.5)
HGB BLDA-MCNC: 10.6 G/DL — LOW (ref 11.5–15.5)
IMM GRANULOCYTES NFR BLD AUTO: 1.1 % — SIGNIFICANT CHANGE UP (ref 0–1.5)
IRON SATN MFR SERPL: 299 UG/DL — SIGNIFICANT CHANGE UP (ref 140–530)
IRON SATN MFR SERPL: 62 UG/DL — SIGNIFICANT CHANGE UP (ref 30–160)
LACTATE BLDA-SCNC: 2.4 MMOL/L — HIGH (ref 0.5–2)
LYMPHOCYTES # BLD AUTO: 0.9 K/UL — LOW (ref 1–3.3)
LYMPHOCYTES # BLD AUTO: 17.2 % — SIGNIFICANT CHANGE UP (ref 13–44)
MAGNESIUM SERPL-MCNC: 2.3 MG/DL — SIGNIFICANT CHANGE UP (ref 1.6–2.6)
MCHC RBC-ENTMCNC: 29.3 PG — SIGNIFICANT CHANGE UP (ref 27–34)
MCHC RBC-ENTMCNC: 31.7 % — LOW (ref 32–36)
MCV RBC AUTO: 92.5 FL — SIGNIFICANT CHANGE UP (ref 80–100)
MONOCYTES # BLD AUTO: 0.29 K/UL — SIGNIFICANT CHANGE UP (ref 0–0.9)
MONOCYTES NFR BLD AUTO: 5.5 % — SIGNIFICANT CHANGE UP (ref 2–14)
NEUTROPHILS # BLD AUTO: 3.97 K/UL — SIGNIFICANT CHANGE UP (ref 1.8–7.4)
NEUTROPHILS NFR BLD AUTO: 75.8 % — SIGNIFICANT CHANGE UP (ref 43–77)
NRBC # FLD: 0.06 K/UL — SIGNIFICANT CHANGE UP (ref 0–0)
NRBC FLD-RTO: 1.1 — SIGNIFICANT CHANGE UP
PCO2 BLDA: 50 MMHG — HIGH (ref 32–48)
PH BLDA: 7.42 PH — SIGNIFICANT CHANGE UP (ref 7.35–7.45)
PHOSPHATE SERPL-MCNC: 4.7 MG/DL — HIGH (ref 2.5–4.5)
PLATELET # BLD AUTO: 63 K/UL — LOW (ref 150–400)
PMV BLD: SIGNIFICANT CHANGE UP FL (ref 7–13)
PO2 BLDA: 58 MMHG — LOW (ref 83–108)
POTASSIUM BLDA-SCNC: 4.2 MMOL/L — SIGNIFICANT CHANGE UP (ref 3.4–4.5)
POTASSIUM SERPL-MCNC: 4.1 MMOL/L — SIGNIFICANT CHANGE UP (ref 3.5–5.3)
POTASSIUM SERPL-SCNC: 4.1 MMOL/L — SIGNIFICANT CHANGE UP (ref 3.5–5.3)
PROT SERPL-MCNC: 5.8 G/DL — LOW (ref 6–8.3)
RBC # BLD: 3.72 M/UL — LOW (ref 3.8–5.2)
RBC # FLD: 14.6 % — HIGH (ref 10.3–14.5)
RETICS #: 31 K/UL — SIGNIFICANT CHANGE UP (ref 25–125)
RETICS/RBC NFR: 0.8 % — SIGNIFICANT CHANGE UP (ref 0.5–2.5)
SAO2 % BLDA: 88.3 % — LOW (ref 95–99)
SODIUM BLDA-SCNC: 142 MMOL/L — SIGNIFICANT CHANGE UP (ref 136–146)
SODIUM SERPL-SCNC: 147 MMOL/L — HIGH (ref 135–145)
UIBC SERPL-MCNC: 237.2 UG/DL — SIGNIFICANT CHANGE UP (ref 110–370)
WBC # BLD: 5.24 K/UL — SIGNIFICANT CHANGE UP (ref 3.8–10.5)
WBC # FLD AUTO: 5.24 K/UL — SIGNIFICANT CHANGE UP (ref 3.8–10.5)

## 2019-06-10 PROCEDURE — 99232 SBSQ HOSP IP/OBS MODERATE 35: CPT | Mod: GC

## 2019-06-10 PROCEDURE — 99291 CRITICAL CARE FIRST HOUR: CPT | Mod: 25

## 2019-06-10 RX ORDER — FENTANYL CITRATE 50 UG/ML
100 INJECTION INTRAVENOUS ONCE
Refills: 0 | Status: DISCONTINUED | OUTPATIENT
Start: 2019-06-10 | End: 2019-06-10

## 2019-06-10 RX ORDER — HYDRALAZINE HCL 50 MG
10 TABLET ORAL EVERY 8 HOURS
Refills: 0 | Status: DISCONTINUED | OUTPATIENT
Start: 2019-06-10 | End: 2019-06-10

## 2019-06-10 RX ORDER — MIDAZOLAM HYDROCHLORIDE 1 MG/ML
4 INJECTION, SOLUTION INTRAMUSCULAR; INTRAVENOUS ONCE
Refills: 0 | Status: DISCONTINUED | OUTPATIENT
Start: 2019-06-10 | End: 2019-06-10

## 2019-06-10 RX ORDER — HYDRALAZINE HCL 50 MG
10 TABLET ORAL ONCE
Refills: 0 | Status: COMPLETED | OUTPATIENT
Start: 2019-06-10 | End: 2019-06-10

## 2019-06-10 RX ORDER — HYDRALAZINE HCL 50 MG
10 TABLET ORAL EVERY 8 HOURS
Refills: 0 | Status: DISCONTINUED | OUTPATIENT
Start: 2019-06-10 | End: 2019-06-11

## 2019-06-10 RX ORDER — INSULIN GLARGINE 100 [IU]/ML
9 INJECTION, SOLUTION SUBCUTANEOUS AT BEDTIME
Refills: 0 | Status: DISCONTINUED | OUTPATIENT
Start: 2019-06-10 | End: 2019-06-10

## 2019-06-10 RX ORDER — FENTANYL CITRATE 50 UG/ML
50 INJECTION INTRAVENOUS ONCE
Refills: 0 | Status: DISCONTINUED | OUTPATIENT
Start: 2019-06-10 | End: 2019-06-10

## 2019-06-10 RX ORDER — INSULIN LISPRO 100/ML
3 VIAL (ML) SUBCUTANEOUS
Refills: 0 | Status: DISCONTINUED | OUTPATIENT
Start: 2019-06-10 | End: 2019-06-11

## 2019-06-10 RX ADMIN — Medication 10 MILLIGRAM(S): at 14:54

## 2019-06-10 RX ADMIN — Medication 4 MILLIGRAM(S): at 11:47

## 2019-06-10 RX ADMIN — Medication 4 MILLIGRAM(S): at 17:20

## 2019-06-10 RX ADMIN — PIPERACILLIN AND TAZOBACTAM 25 GRAM(S): 4; .5 INJECTION, POWDER, LYOPHILIZED, FOR SOLUTION INTRAVENOUS at 14:54

## 2019-06-10 RX ADMIN — Medication 0.5 MILLIGRAM(S): at 17:20

## 2019-06-10 RX ADMIN — RISPERIDONE 3 MILLIGRAM(S): 4 TABLET ORAL at 17:20

## 2019-06-10 RX ADMIN — CHLORHEXIDINE GLUCONATE 15 MILLILITER(S): 213 SOLUTION TOPICAL at 17:20

## 2019-06-10 RX ADMIN — CHLORHEXIDINE GLUCONATE 1 APPLICATION(S): 213 SOLUTION TOPICAL at 08:58

## 2019-06-10 RX ADMIN — Medication 2: at 12:02

## 2019-06-10 RX ADMIN — Medication 4 MILLIGRAM(S): at 05:10

## 2019-06-10 RX ADMIN — Medication 500 MILLIGRAM(S): at 17:20

## 2019-06-10 RX ADMIN — FENTANYL CITRATE 50 MICROGRAM(S): 50 INJECTION INTRAVENOUS at 11:49

## 2019-06-10 RX ADMIN — PANTOPRAZOLE SODIUM 40 MILLIGRAM(S): 20 TABLET, DELAYED RELEASE ORAL at 11:47

## 2019-06-10 RX ADMIN — PIPERACILLIN AND TAZOBACTAM 25 GRAM(S): 4; .5 INJECTION, POWDER, LYOPHILIZED, FOR SOLUTION INTRAVENOUS at 21:31

## 2019-06-10 RX ADMIN — CITALOPRAM 20 MILLIGRAM(S): 10 TABLET, FILM COATED ORAL at 11:47

## 2019-06-10 RX ADMIN — Medication 0.5 MILLIGRAM(S): at 05:32

## 2019-06-10 RX ADMIN — CHLORHEXIDINE GLUCONATE 15 MILLILITER(S): 213 SOLUTION TOPICAL at 05:10

## 2019-06-10 RX ADMIN — Medication 2: at 05:30

## 2019-06-10 RX ADMIN — Medication 2: at 18:22

## 2019-06-10 RX ADMIN — Medication 10 MILLIGRAM(S): at 18:21

## 2019-06-10 RX ADMIN — PIPERACILLIN AND TAZOBACTAM 25 GRAM(S): 4; .5 INJECTION, POWDER, LYOPHILIZED, FOR SOLUTION INTRAVENOUS at 05:08

## 2019-06-10 RX ADMIN — FENTANYL CITRATE 100 MICROGRAM(S): 50 INJECTION INTRAVENOUS at 03:41

## 2019-06-10 RX ADMIN — Medication 500 MILLIGRAM(S): at 08:58

## 2019-06-10 RX ADMIN — Medication 10 MILLIGRAM(S): at 21:31

## 2019-06-10 RX ADMIN — RISPERIDONE 3 MILLIGRAM(S): 4 TABLET ORAL at 05:09

## 2019-06-10 RX ADMIN — Medication 10 MILLIGRAM(S): at 08:58

## 2019-06-10 RX ADMIN — COSYNTROPIN 0.25 MILLIGRAM(S): 0.25 INJECTION, SOLUTION INTRAVENOUS at 08:58

## 2019-06-10 RX ADMIN — MIDAZOLAM HYDROCHLORIDE 4 MILLIGRAM(S): 1 INJECTION, SOLUTION INTRAMUSCULAR; INTRAVENOUS at 03:46

## 2019-06-10 RX ADMIN — FENTANYL CITRATE 50 MICROGRAM(S): 50 INJECTION INTRAVENOUS at 12:02

## 2019-06-10 NOTE — PROGRESS NOTE ADULT - PROBLEM SELECTOR PLAN 2
-start moderate correctional scale q6  -on discharge, will need to restart an oral agent - metformin if CrCl is normal as she tolerated it in the past. -start Lantus 9 U and Humalgo 3U TIDAC plus low dose corecoitnal scale before meals and at bedtime)  -on discharge, can discahrge on metformin (depending on CrCl) vs DPP-4 Inhibitor (renally dosed)  A1c GOAL 7.5-8.0 -Expect hyperglycemia to improve as steroids will be discontinued  -Keep on moderate dose correctional scale q 6 hrs.   -on discharge, can discahrge on metformin (depending on CrCl) vs DPP-4 Inhibitor (renally dosed)  A1C GOAL 7.5-8.0 -Expect hyperglycemia to improve as steroids will be discontinued  -Keep on moderate dose correctional scale q 6 hrs.   -on discharge, can discahrge on metformin (depending on CrCl/LFTS) vs DPP-4 Inhibitor (renally dosed)  A1C GOAL 7.5-8.0

## 2019-06-10 NOTE — PROGRESS NOTE ADULT - ATTENDING COMMENTS
Meghana Landers MD  Pager [phone] ([facility])/ [phone] ([facility]) [please provide 10 digit call back number]  Nights and weekends: [phone]  Please note that this patient may be followed by a different provider tomorrow. If no answer or after hours, please contact [phone].  For final dc reccomendations, please call [phone] or page the endocrine fellow on call.

## 2019-06-10 NOTE — PROGRESS NOTE ADULT - ATTENDING COMMENTS
69 year old woman with schizoaffective disorder, dementia, HTN, DM2, brought in by family with weakness and AMS, found to be hypothermic, bradycardic and hypoglycemic etiology thought to be sepsis +/- adrenal insufficiency    - vital signs improved with antibiotics and stress dose steroids  - endocrine evaluation appreciated patient is now on dexamethasone repeat cosyntropin stim test pending  - wean off sedation to assess mental status  - on broad spectrum ABx cultures remain negative to date   - respiratory alkalosis, vent adjusted to compensate    condition guarded  cc time spent 40 min   case discussed with family at bedside

## 2019-06-10 NOTE — PROGRESS NOTE ADULT - PROBLEM SELECTOR PLAN 1
Would taper decadron to 4 mg q 8 hours folowed by 4 mg q 12 hours followed by 4 mg daily Would taper decadron to 4 mg q 8 hours folowed by 4 mg q 12 hours followed by 4 mg daily. Patient had ACTH stim test performed on DEX Baseline cortisol was not obtained but early AM cortisol was 6.3, 30 min after cosyntropin was 24 and 60 min afterwards was 31. DEX does not affect ACTH stim assay so it appears as if patient appropriately responded.   Can r/o primary AI with above results. Please discontinue decadron now.  If, however, patient decompensates, can restart on hydrocortisone 50mg IV q 8 hours.  Cannot check ACTH now but would check in 48-72 hours once DEX effect has worn off  Can repeat ACTH STIM test in 48-72 hours. Perform as follows: Draw 8AM baseline cortisol and ACTH, give cosyntropin 250 ug X 1 then check serum cortisol at 30 min and 60 min. Patient had ACTH stim test performed on DEX.  Baseline cortisol was not obtained but early AM cortisol was 6.3, 30 min after cosyntropin was 24 and 60 min afterwards was 31. DEX does not affect ACTH stim assay so it appears as if patient appropriately responded.   Can r/o primary AI with above results. Please discontinue decadron now especially given marked hypertension to -190  If, however, patient decompensates and is HD unstable, can restart on hydrocortisone 50mg IV q 8 hours  Cannot check ACTH now on dex but would check in 48-72 hours once DEX effect has worn off  Would repeat ACTH STIM test in 48-72 hours off ALL steroids.   Perform as follows: Draw 8AM baseline cortisol and ACTH, give cosyntropin 250 ug X 1 then check serum cortisol at 30 min and 60 min.

## 2019-06-10 NOTE — PROGRESS NOTE ADULT - ASSESSMENT
Patient is a 69 year old woman with history of HTN, dementia, schizoaffective disorder presents with lethargy and altered mental status s/p intubation for hypercarbic respiratory failure, also found to have hypothermic, bradycardia and pancytopenia likely due to  adrenal insufficiency.      # Neuro:   - History of dementia  - Patient is currently sedated on Fentanyl     # Psych:  - History of schizoaffective disorder  - Home medication include: Citalopram 20 mg QD, Risperidone 3 mg QD, Valproic acid 1500 mg QHS, Benztropine 0.5 mg QD  - Continue with home medication Citalopram 20 mg QD, Risperidone 3 mg QD, Valproic acid 500 mg TID, Benztropine 0.5 mg QD    # CV:  - Currently sinus bradycardia with HR 40-50, hypotensive 110-116/50-60 but off pressor, also hypothermic  - Shock state could be distributive in the setting of sepsis vs adrenal insufficiency   - For sepsis, UA negative, blood culture and urine culture negative, RVP negative, no clear source of infection, continue with empiric coverage with Zosyn for possible aspiration pneumonia for now (6/6-)  - For adrenal insufficiency, cosyntropin stimulation test today, will check cortisol and ACTH level 30 and 60 minutes after 0.25 mg of Cosyntropin   - Continue to monitor hemodynamics     # Resp:  - Intubated on 6/6 for hypercarbic respiratory failure  - Previous chest xray on 06/07 showing small left pleural effusion, left basilar opacity atelectasis vs pneumonia  - Currently on vent setting 400/9/30/5, last ABG on 6/8 showing 7.40/48/80/28  - Daily CPAP    # GI  - No issues   - Continue with PPI IV  - Continue with tube feed    # Renal  - Cr now trending up 0.55 on admission -->1.21-->1.39  - BUN/Cr ratio suggestive of pre-renal etiology   - Could be in the setting of hypoperfusion in the setting of shock state  - Monitor Cr after fluid challenge  - Monitor UOP    # Endo:  Problem: T2DM with A1C 8.9  - Not on home medications   - Continue to monitor FS Q6H  - ISS Q6H    Problem: Adrenal insufficiency   - Patient presented with hypothermia, bradycardia, and hypotension suspicious for adrenal insufficiency   - AM cortisol was 5.9  - Hydrocortisone 100mg Q8 now discontinued in preparation for cosyntropin test today   - If patient has primary adrenal insufficiency, endogenous ACTH will be elevated and there will be minimum response of cortisol to cosyntropin  - If patient has hypopituitarism with deficient in ACTH secretion and secondary adrenal insufficiency, cortisol will respond to cosyntropin  - Endocrine recs appreciated     # Heme  - Pancytopenia on admission   - For leukopenia, resolved  - For anemia, Hb now stable at 9.7, no signs of active bleeding, will check iron study, retic count for further evaluation   - For thrombocytopenia, platelet now downtrending 35 K, no petechiae no gingival bleeding, etiology of thrombocytopenia could be in the setting of sepsis vs medication (Depakote)  - Continue to monitor CBC    # ID  - Leukopenia now resolved  - s/p ceftriaxone in the ED   - Urine legionella negative, Azithromycin and Vancomycin discontinued on 6/8  - Continue with Zosyn for aspiration pneumonia (6/6-)    # DVT ppx  - SCD (no pharm VTE due to thrombocytopenia) Patient is a 69 year old woman with history of HTN, dementia, schizoaffective disorder presents with lethargy and altered mental status s/p intubation for hypercarbic respiratory failure, also found to have hypothermic, bradycardia and pancytopenia likely due to  adrenal insufficiency.      # Neuro:   - History of dementia  - Patient is currently sedated on Fentanyl     # Psych:  - History of schizoaffective disorder  - Home medication include: Citalopram 20 mg QD, Risperidone 3 mg QD, Valproic acid 1500 mg QHS, Benztropine 0.5 mg QD  - Continue with home medication Citalopram 20 mg QD, Risperidone 3 mg QD, Valproic acid 500 mg TID, Benztropine 0.5 mg QD    # CV:  - Intermittent bradycardia. Off pressor  - Shock state could be distributive in the setting of sepsis vs adrenal insufficiency   - Workup for infection negative till date  - Zosyn for possible aspiration pneumonia for now (6/6-)  - For adrenal insufficiency, repeat cosyntropin stimulation test today,  - Continue to monitor hemodynamics     # Resp:  - Intubated on 6/6 for hypercarbic respiratory failure  - Previous chest xray on 06/07 showing small left pleural effusion, left basilar opacity atelectasis vs pneumonia  - Daily CPAP trial    # GI  - No issues   - Continue with PPI IV  - Continue with tube feed    # Renal  - Cr now trending up 0.55 on admission. 1.31 today  - Likely pre-renal in the setting of sepsis/adrenal insufficiency   - Monitor UOP    # Endo:  - T2DM with A1C 8.9  - Not on home medications   - Continue to monitor FS Q6H  - ISS Q6H    Problem: Adrenal insufficiency   - Currently on Decadron 4mg Q8  - Repeat ACTH stimulation test  - Endocrine recs appreciated     # Heme  - Pancytopenia on admission   - For leukopenia, resolved  - For anemia, Hb now stable at 9.7, no signs of active bleeding, will check iron study, retic count for further evaluation   - For thrombocytopenia, platelet now downtrending 35 K, no petechiae no gingival bleeding, etiology of thrombocytopenia could be in the setting of sepsis vs medication (Depakote)  - Continue to monitor CBC    # ID  - Leukopenia now resolved  - s/p ceftriaxone in the ED   - Urine legionella negative, Azithromycin and Vancomycin discontinued on 6/8  - Continue with Zosyn for aspiration pneumonia (6/6-)    # DVT ppx  - SCD (no pharm VTE due to thrombocytopenia) Patient is a 69 year old woman with history of HTN, dementia, schizoaffective disorder presents with lethargy and altered mental status s/p intubation for hypercarbic respiratory failure, also found to have hypothermic, bradycardia and pancytopenia likely due to  adrenal insufficiency.      # Neuro:   - History of dementia  - Was sedated on Fentanyl  - Currently off sedation     # Psych:  - History of schizoaffective disorder  - Home medication include: Citalopram 20 mg QD, Risperidone 3 mg QD, Valproic acid 1500 mg QHS, Benztropine 0.5 mg QD  - Continue with home medication Citalopram 20 mg QD, Risperidone 3 mg QD, Valproic acid 500 mg TID, Benztropine 0.5 mg QD    # CV:  - Intermittent bradycardia. Off pressor  - Shock state could be distributive in the setting of sepsis vs adrenal insufficiency   - Workup for infection negative till date  - Zosyn for possible aspiration pneumonia. Plan for 7-day course. End date 6/12  - For adrenal insufficiency, repeat cosyntropin stimulation test today,  - Continue to monitor hemodynamics.     # Resp:  - Intubated on 6/6 for hypercarbic respiratory failure  - Previous chest xray on 06/07 showing small left pleural effusion, left basilar opacity atelectasis vs pneumonia  - Daily CPAP trial    # GI  - No issues   - Continue with PPI IV  - Continue with tube feed    # Renal  - Cr now trending up 0.55 on admission. 1.31 today  - Likely pre-renal in the setting of sepsis/adrenal insufficiency   - Monitor UOP    # Endo:  - T2DM with A1C 8.9  - Not on home medications   - Started Lantus 9U, Humalog 3U TID, SSI  - Monitor FS  - Endo recs    Problem: Adrenal insufficiency   - Currently on Decadron 4mg Q8  - Repeat ACTH stimulation test  - Endocrine recs appreciated     # Heme  - Pancytopenia on admission   - For leukopenia, resolved  - For anemia, Hb now stable at 9.7, no signs of active bleeding, will check iron study, retic count for further evaluation   - For thrombocytopenia, platelet now downtrending 35 K, no petechiae no gingival bleeding, etiology of thrombocytopenia could be in the setting of sepsis vs medication (Depakote)  - Continue to monitor CBC    # ID  - Leukopenia now resolved  - s/p ceftriaxone in the ED   - Urine legionella negative, Azithromycin and Vancomycin discontinued on 6/8  - Continue with Zosyn for aspiration pneumonia (6/6-)    # DVT ppx  - SCD (no pharm VTE due to thrombocytopenia)

## 2019-06-10 NOTE — PROGRESS NOTE ADULT - SUBJECTIVE AND OBJECTIVE BOX
INTERVAL HPI/OVERNIGHT EVENTS:    SUBJECTIVE: Patient seen and examined at bedside.     CONSTITUTIONAL: No weakness, fevers or chills  EYES/ENT: No visual changes;  No vertigo or throat pain   NECK: No pain or stiffness  RESPIRATORY: No cough, wheezing, hemoptysis; No shortness of breath  CARDIOVASCULAR: No chest pain or palpitations  GASTROINTESTINAL: No abdominal or epigastric pain. No nausea, vomiting, or hematemesis; No diarrhea or constipation. No melena or hematochezia.  GENITOURINARY: No dysuria, frequency or hematuria  NEUROLOGICAL: No numbness or weakness  SKIN: No itching, rashes    OBJECTIVE:    VITAL SIGNS:  ICU Vital Signs Last 24 Hrs  T(C): 35.8 (10 Edwin 2019 04:00), Max: 37.8 (09 Jun 2019 12:00)  T(F): 96.5 (10 Edwin 2019 04:00), Max: 100.1 (09 Jun 2019 12:00)  HR: 59 (10 Edwin 2019 07:08) (34 - 74)  BP: 179/66 (10 Edwin 2019 06:00) (102/52 - 180/72)  BP(mean): 101 (10 Edwin 2019 06:00) (68 - 102)  ABP: --  ABP(mean): --  RR: 20 (10 Edwin 2019 06:00) (9 - 20)  SpO2: 100% (10 Edwin 2019 07:08) (99% - 100%)    Mode: CPAP with PS, FiO2: 30, PEEP: 5, PS: 5, MAP: 7    06-09 @ 07:01  -  06-10 @ 07:00  --------------------------------------------------------  IN: 670 mL / OUT: 600 mL / NET: 70 mL      CAPILLARY BLOOD GLUCOSE      POCT Blood Glucose.: 234 mg/dL (10 Edwin 2019 05:29)      PHYSICAL EXAM:    General: NAD  HEENT: NC/AT; PERRL, clear conjunctiva  Neck: supple  Respiratory: CTA b/l  Cardiovascular: +S1/S2; RRR  Abdomen: soft, NT/ND; +BS x4  Extremities: WWP, 2+ peripheral pulses b/l; no LE edema  Skin: normal color and turgor; no rash  Neurological:    MEDICATIONS:  MEDICATIONS  (STANDING):  benztropine 0.5 milliGRAM(s) Oral two times a day  chlorhexidine 0.12% Liquid 15 milliLiter(s) Oral Mucosa two times a day  chlorhexidine 4% Liquid 1 Application(s) Topical <User Schedule>  citalopram Solution 20 milliGRAM(s) Enteral Tube daily  cosyntropin Injectable 0.25 milliGRAM(s) IV Push once  dexamethasone  Injectable 4 milliGRAM(s) IV Push every 6 hours  dextrose 5%. 1000 milliLiter(s) (50 mL/Hr) IV Continuous <Continuous>  dextrose 50% Injectable 12.5 Gram(s) IV Push once  dextrose 50% Injectable 25 Gram(s) IV Push once  dextrose 50% Injectable 25 Gram(s) IV Push once  hydrALAZINE 10 milliGRAM(s) Oral three times a day  insulin lispro (HumaLOG) corrective regimen sliding scale   SubCutaneous every 6 hours  pantoprazole  Injectable 40 milliGRAM(s) IV Push daily  piperacillin/tazobactam IVPB. 3.375 Gram(s) IV Intermittent every 8 hours  risperiDONE   Solution 3 milliGRAM(s) Oral two times a day  valproic  acid Syrup 500 milliGRAM(s) Oral three times a day    MEDICATIONS  (PRN):  dextrose 40% Gel 15 Gram(s) Oral once PRN Blood Glucose LESS THAN 70 milliGRAM(s)/deciliter  glucagon  Injectable 1 milliGRAM(s) IntraMuscular once PRN Glucose LESS THAN 70 milligrams/deciliter      ALLERGIES:  Allergies    No Known Allergies    Intolerances        LABS:                        10.9   5.24  )-----------( 63       ( 10 Edwin 2019 02:33 )             34.4     06-10    147<H>  |  104  |  33<H>  ----------------------------<  250<H>  4.1   |  28  |  1.31<H>    Ca    8.5      10 Edwin 2019 02:33  Phos  4.7     06-10  Mg     2.3     06-10    TPro  5.8<L>  /  Alb  3.2<L>  /  TBili  0.3  /  DBili  x   /  AST  11  /  ALT  10  /  AlkPhos  47  06-10    PT/INR - ( 09 Jun 2019 03:01 )   PT: 10.6 SEC;   INR: 0.96          PTT - ( 09 Jun 2019 03:01 )  PTT:28.5 SEC      RADIOLOGY & ADDITIONAL TESTS: Reviewed. INTERVAL HPI/OVERNIGHT EVENTS:    SUBJECTIVE: No acute events overnight. Patient seen and examined at bedside. Patient not answering questions so unable to assess full ROS        OBJECTIVE:    VITAL SIGNS:  ICU Vital Signs Last 24 Hrs  T(C): 35.8 (10 Edwin 2019 04:00), Max: 37.8 (09 Jun 2019 12:00)  T(F): 96.5 (10 Edwin 2019 04:00), Max: 100.1 (09 Jun 2019 12:00)  HR: 59 (10 Edwin 2019 07:08) (34 - 74)  BP: 179/66 (10 Edwin 2019 06:00) (102/52 - 180/72)  BP(mean): 101 (10 Edwin 2019 06:00) (68 - 102)  ABP: --  ABP(mean): --  RR: 20 (10 Edwin 2019 06:00) (9 - 20)  SpO2: 100% (10 Edwin 2019 07:08) (99% - 100%)    Mode: CPAP with PS, FiO2: 30, PEEP: 5, PS: 5, MAP: 7    06-09 @ 07:01  -  06-10 @ 07:00  --------------------------------------------------------  IN: 670 mL / OUT: 600 mL / NET: 70 mL      CAPILLARY BLOOD GLUCOSE      POCT Blood Glucose.: 234 mg/dL (10 Edwin 2019 05:29)      PHYSICAL EXAM:    General: NAD  HEENT: NC/AT; PERRL, clear conjunctiva  Neck: supple  Respiratory: Coarse breath sound.   Cardiovascular: +S1/S2; RRR  Abdomen: soft, NT/ND; +BS x4  Extremities: WWP, 2+ peripheral pulses b/l; no LE edema  Skin: normal color and turgor; no rash  Neurological:    MEDICATIONS:  MEDICATIONS  (STANDING):  benztropine 0.5 milliGRAM(s) Oral two times a day  chlorhexidine 0.12% Liquid 15 milliLiter(s) Oral Mucosa two times a day  chlorhexidine 4% Liquid 1 Application(s) Topical <User Schedule>  citalopram Solution 20 milliGRAM(s) Enteral Tube daily  cosyntropin Injectable 0.25 milliGRAM(s) IV Push once  dexamethasone  Injectable 4 milliGRAM(s) IV Push every 6 hours  dextrose 5%. 1000 milliLiter(s) (50 mL/Hr) IV Continuous <Continuous>  dextrose 50% Injectable 12.5 Gram(s) IV Push once  dextrose 50% Injectable 25 Gram(s) IV Push once  dextrose 50% Injectable 25 Gram(s) IV Push once  hydrALAZINE 10 milliGRAM(s) Oral three times a day  insulin lispro (HumaLOG) corrective regimen sliding scale   SubCutaneous every 6 hours  pantoprazole  Injectable 40 milliGRAM(s) IV Push daily  piperacillin/tazobactam IVPB. 3.375 Gram(s) IV Intermittent every 8 hours  risperiDONE   Solution 3 milliGRAM(s) Oral two times a day  valproic  acid Syrup 500 milliGRAM(s) Oral three times a day    MEDICATIONS  (PRN):  dextrose 40% Gel 15 Gram(s) Oral once PRN Blood Glucose LESS THAN 70 milliGRAM(s)/deciliter  glucagon  Injectable 1 milliGRAM(s) IntraMuscular once PRN Glucose LESS THAN 70 milligrams/deciliter      ALLERGIES:  Allergies    No Known Allergies    Intolerances        LABS:                        10.9   5.24  )-----------( 63       ( 10 Edwin 2019 02:33 )             34.4     06-10    147<H>  |  104  |  33<H>  ----------------------------<  250<H>  4.1   |  28  |  1.31<H>    Ca    8.5      10 Edwin 2019 02:33  Phos  4.7     06-10  Mg     2.3     06-10    TPro  5.8<L>  /  Alb  3.2<L>  /  TBili  0.3  /  DBili  x   /  AST  11  /  ALT  10  /  AlkPhos  47  06-10    PT/INR - ( 09 Jun 2019 03:01 )   PT: 10.6 SEC;   INR: 0.96          PTT - ( 09 Jun 2019 03:01 )  PTT:28.5 SEC      RADIOLOGY & ADDITIONAL TESTS: Reviewed.

## 2019-06-10 NOTE — PROGRESS NOTE ADULT - SUBJECTIVE AND OBJECTIVE BOX
Chief Complaint: T2DM, rule out AI    History:    MEDICATIONS  (STANDING):  benztropine 0.5 milliGRAM(s) Oral two times a day  chlorhexidine 0.12% Liquid 15 milliLiter(s) Oral Mucosa two times a day  chlorhexidine 4% Liquid 1 Application(s) Topical <User Schedule>  citalopram Solution 20 milliGRAM(s) Enteral Tube daily  dexamethasone  Injectable 4 milliGRAM(s) IV Push every 6 hours  dextrose 5%. 1000 milliLiter(s) (50 mL/Hr) IV Continuous <Continuous>  dextrose 50% Injectable 12.5 Gram(s) IV Push once  dextrose 50% Injectable 25 Gram(s) IV Push once  dextrose 50% Injectable 25 Gram(s) IV Push once  hydrALAZINE 10 milliGRAM(s) Oral every 8 hours  insulin lispro (HumaLOG) corrective regimen sliding scale   SubCutaneous every 6 hours  pantoprazole  Injectable 40 milliGRAM(s) IV Push daily  piperacillin/tazobactam IVPB. 3.375 Gram(s) IV Intermittent every 8 hours  risperiDONE   Solution 3 milliGRAM(s) Oral two times a day  valproic  acid Syrup 500 milliGRAM(s) Oral three times a day    MEDICATIONS  (PRN):  dextrose 40% Gel 15 Gram(s) Oral once PRN Blood Glucose LESS THAN 70 milliGRAM(s)/deciliter  glucagon  Injectable 1 milliGRAM(s) IntraMuscular once PRN Glucose LESS THAN 70 milligrams/deciliter      Allergies    No Known Allergies    Intolerances      Review of Systems:  Constitutional: No fever  Eyes: No blurry vision  Neuro: No tremors  HEENT: No pain  Cardiovascular: No chest pain, palpitations  Respiratory: No SOB, no cough  GI: No nausea, vomiting, abdominal pain  : No dysuria  Skin: no rash  Psych: no depression  Endocrine: no polyuria, polydipsia  Hem/lymph: no swelling  Osteoporosis: no fractures    ALL OTHER SYSTEMS REVIEWED AND NEGATIVE    UNABLE TO OBTAIN    PHYSICAL EXAM:  VITALS: T(C): 37 (06-10-19 @ 12:00)  T(F): 98.6 (06-10-19 @ 12:00), Max: 98.9 (06-09-19 @ 16:00)  HR: 50 (06-10-19 @ 13:00) (34 - 74)  BP: 182/66 (06-10-19 @ 13:00) (115/51 - 195/81)  RR:  (9 - 28)  SpO2:  (98% - 100%)  Wt(kg): --  GENERAL: NAD, well-groomed, well-developed  EYES: No proptosis, no lid lag, anicteric  HEENT:  Atraumatic, Normocephalic, moist mucous membranes  THYROID: Normal size, no palpable nodules  RESPIRATORY: Clear to auscultation bilaterally; No rales, rhonchi, wheezing, or rubs  CARDIOVASCULAR: Regular rate and rhythm; No murmurs; no peripheral edema  GI: Soft, nontender, non distended, normal bowel sounds  SKIN: Dry, intact, No rashes or lesions  MUSCULOSKELETAL: Full range of motion, normal strength  NEURO: sensation intact, extraocular movements intact, no tremor, normal reflexes  PSYCH: Alert and oriented x 3, normal affect, normal mood  CUSHING'S SIGNS: no striae    POCT Blood Glucose.: 241 mg/dL (06-10-19 @ 12:00)  POCT Blood Glucose.: 234 mg/dL (06-10-19 @ 05:29)  POCT Blood Glucose.: 243 mg/dL (06-10-19 @ 05:27)  POCT Blood Glucose.: 205 mg/dL (06-10-19 @ 05:20)  POCT Blood Glucose.: 191 mg/dL (06-09-19 @ 23:09)  POCT Blood Glucose.: 167 mg/dL (06-09-19 @ 17:27)  POCT Blood Glucose.: 167 mg/dL (06-09-19 @ 12:34)  POCT Blood Glucose.: 167 mg/dL (06-09-19 @ 06:29)  POCT Blood Glucose.: 151 mg/dL (06-08-19 @ 23:54)  POCT Blood Glucose.: 194 mg/dL (06-08-19 @ 17:49)  POCT Blood Glucose.: 163 mg/dL (06-08-19 @ 12:15)  POCT Blood Glucose.: 197 mg/dL (06-08-19 @ 05:21)  POCT Blood Glucose.: 141 mg/dL (06-08-19 @ 00:37)  POCT Blood Glucose.: 115 mg/dL (06-07-19 @ 17:44)      06-10    147<H>  |  104  |  33<H>  ----------------------------<  250<H>  4.1   |  28  |  1.31<H>    EGFR if : 48  EGFR if non : 41    Ca    8.5      06-10  Mg     2.3     06-10  Phos  4.7     06-10    TPro  5.8<L>  /  Alb  3.2<L>  /  TBili  0.3  /  DBili  x   /  AST  11  /  ALT  10  /  AlkPhos  47  06-10          Thyroid Function Tests:  06-06 @ 17:06 TSH 3.70 FreeT4 -- T3 -- Anti TPO -- Anti Thyroglobulin Ab -- TSI --      Hemoglobin A1C, Whole Blood: 8.9 % <H> [4.0 - 5.6] (06-08-19 @ 05:20) Chief Complaint: T2DM, rule out AI    History: Patient continues to receive decadron. She is hypertensive. Na 147 K 4.1. Also noted to be hypoglycemic in 200s.     MEDICATIONS  (STANDING):  benztropine 0.5 milliGRAM(s) Oral two times a day  chlorhexidine 0.12% Liquid 15 milliLiter(s) Oral Mucosa two times a day  chlorhexidine 4% Liquid 1 Application(s) Topical <User Schedule>  citalopram Solution 20 milliGRAM(s) Enteral Tube daily  dexamethasone  Injectable 4 milliGRAM(s) IV Push every 6 hours  dextrose 5%. 1000 milliLiter(s) (50 mL/Hr) IV Continuous <Continuous>  dextrose 50% Injectable 12.5 Gram(s) IV Push once  dextrose 50% Injectable 25 Gram(s) IV Push once  dextrose 50% Injectable 25 Gram(s) IV Push once  hydrALAZINE 10 milliGRAM(s) Oral every 8 hours  insulin lispro (HumaLOG) corrective regimen sliding scale   SubCutaneous every 6 hours  pantoprazole  Injectable 40 milliGRAM(s) IV Push daily  piperacillin/tazobactam IVPB. 3.375 Gram(s) IV Intermittent every 8 hours  risperiDONE   Solution 3 milliGRAM(s) Oral two times a day  valproic  acid Syrup 500 milliGRAM(s) Oral three times a day    MEDICATIONS  (PRN):  dextrose 40% Gel 15 Gram(s) Oral once PRN Blood Glucose LESS THAN 70 milliGRAM(s)/deciliter  glucagon  Injectable 1 milliGRAM(s) IntraMuscular once PRN Glucose LESS THAN 70 milligrams/deciliter      Allergies    No Known Allergies    Intolerances      Review of Systems:  Constitutional: No fever  Eyes: No blurry vision  Neuro: No tremors  HEENT: No pain  Cardiovascular: No chest pain, palpitations  Respiratory: No SOB, no cough  GI: No nausea, vomiting, abdominal pain  : No dysuria  Skin: no rash  Psych: no depression  Endocrine: no polyuria, polydipsia  Hem/lymph: no swelling  Osteoporosis: no fractures    ALL OTHER SYSTEMS REVIEWED AND NEGATIVE    UNABLE TO OBTAIN    PHYSICAL EXAM:  VITALS: T(C): 37 (06-10-19 @ 12:00)  T(F): 98.6 (06-10-19 @ 12:00), Max: 98.9 (06-09-19 @ 16:00)  HR: 50 (06-10-19 @ 13:00) (34 - 74)  BP: 182/66 (06-10-19 @ 13:00) (115/51 - 195/81)  RR:  (9 - 28)  SpO2:  (98% - 100%)  Wt(kg): --  GENERAL: NAD, well-groomed, well-developed  EYES: No proptosis, no lid lag, anicteric  HEENT:  Atraumatic, Normocephalic, moist mucous membranes  THYROID: Normal size, no palpable nodules  RESPIRATORY: Clear to auscultation bilaterally; No rales, rhonchi, wheezing, or rubs  CARDIOVASCULAR: Regular rate and rhythm; No murmurs; no peripheral edema  GI: Soft, nontender, non distended, normal bowel sounds  SKIN: Dry, intact, No rashes or lesions  MUSCULOSKELETAL: Full range of motion, normal strength  NEURO: sensation intact, extraocular movements intact, no tremor, normal reflexes  PSYCH: Alert and oriented x 3, normal affect, normal mood  CUSHING'S SIGNS: no striae    POCT Blood Glucose.: 241 mg/dL (06-10-19 @ 12:00)  POCT Blood Glucose.: 234 mg/dL (06-10-19 @ 05:29)  POCT Blood Glucose.: 243 mg/dL (06-10-19 @ 05:27)  POCT Blood Glucose.: 205 mg/dL (06-10-19 @ 05:20)  POCT Blood Glucose.: 191 mg/dL (06-09-19 @ 23:09)  POCT Blood Glucose.: 167 mg/dL (06-09-19 @ 17:27)  POCT Blood Glucose.: 167 mg/dL (06-09-19 @ 12:34)  POCT Blood Glucose.: 167 mg/dL (06-09-19 @ 06:29)  POCT Blood Glucose.: 151 mg/dL (06-08-19 @ 23:54)  POCT Blood Glucose.: 194 mg/dL (06-08-19 @ 17:49)  POCT Blood Glucose.: 163 mg/dL (06-08-19 @ 12:15)  POCT Blood Glucose.: 197 mg/dL (06-08-19 @ 05:21)  POCT Blood Glucose.: 141 mg/dL (06-08-19 @ 00:37)  POCT Blood Glucose.: 115 mg/dL (06-07-19 @ 17:44)      06-10    147<H>  |  104  |  33<H>  ----------------------------<  250<H>  4.1   |  28  |  1.31<H>    EGFR if : 48  EGFR if non : 41    Ca    8.5      06-10  Mg     2.3     06-10  Phos  4.7     06-10    TPro  5.8<L>  /  Alb  3.2<L>  /  TBili  0.3  /  DBili  x   /  AST  11  /  ALT  10  /  AlkPhos  47  06-10          Thyroid Function Tests:  06-06 @ 17:06 TSH 3.70 FreeT4 -- T3 -- Anti TPO -- Anti Thyroglobulin Ab -- TSI --      Hemoglobin A1C, Whole Blood: 8.9 % <H> [4.0 - 5.6] (06-08-19 @ 05:20) Chief Complaint: T2DM, rule out AI    History: Patient continues to receive decadron. She is hypertensive. Na 147 K 4.1. Also noted to be hypoglycemic in 200s.     MEDICATIONS  (STANDING):  benztropine 0.5 milliGRAM(s) Oral two times a day  chlorhexidine 0.12% Liquid 15 milliLiter(s) Oral Mucosa two times a day  chlorhexidine 4% Liquid 1 Application(s) Topical <User Schedule>  citalopram Solution 20 milliGRAM(s) Enteral Tube daily  dexamethasone  Injectable 4 milliGRAM(s) IV Push every 6 hours  dextrose 5%. 1000 milliLiter(s) (50 mL/Hr) IV Continuous <Continuous>  dextrose 50% Injectable 12.5 Gram(s) IV Push once  dextrose 50% Injectable 25 Gram(s) IV Push once  dextrose 50% Injectable 25 Gram(s) IV Push once  hydrALAZINE 10 milliGRAM(s) Oral every 8 hours  insulin lispro (HumaLOG) corrective regimen sliding scale   SubCutaneous every 6 hours  pantoprazole  Injectable 40 milliGRAM(s) IV Push daily  piperacillin/tazobactam IVPB. 3.375 Gram(s) IV Intermittent every 8 hours  risperiDONE   Solution 3 milliGRAM(s) Oral two times a day  valproic  acid Syrup 500 milliGRAM(s) Oral three times a day    MEDICATIONS  (PRN):  dextrose 40% Gel 15 Gram(s) Oral once PRN Blood Glucose LESS THAN 70 milliGRAM(s)/deciliter  glucagon  Injectable 1 milliGRAM(s) IntraMuscular once PRN Glucose LESS THAN 70 milligrams/deciliter      Allergies    No Known Allergies    Intolerances      Review of Systems:      UNABLE TO OBTAIN    PHYSICAL EXAM:  VITALS: T(C): 37 (06-10-19 @ 12:00)  T(F): 98.6 (06-10-19 @ 12:00), Max: 98.9 (06-09-19 @ 16:00)  HR: 50 (06-10-19 @ 13:00) (34 - 74)  BP: 182/66 (06-10-19 @ 13:00) (115/51 - 195/81)  RR:  (9 - 28)  SpO2:  (98% - 100%)  Wt(kg): --  GENERAL: NAD, intubated  EYES: No proptosis, no lid lag, anicteric  HEENT: intubated +NG tube  THYROID: Normal size, no palpable nodules  RESPIRATORY: Clear to auscultation bilaterally; No rales, rhonchi, wheezing, or rubs  CARDIOVASCULAR: Regular rate and rhythm; No murmurs; no peripheral edema  GI: Soft, nontender, non distended, normal bowel sounds  SKIN: Dry, intact, No rashes or lesions  PSYCH: Alert and oriented x 1      POCT Blood Glucose.: 241 mg/dL (06-10-19 @ 12:00)  POCT Blood Glucose.: 234 mg/dL (06-10-19 @ 05:29)  POCT Blood Glucose.: 243 mg/dL (06-10-19 @ 05:27)  POCT Blood Glucose.: 205 mg/dL (06-10-19 @ 05:20)  POCT Blood Glucose.: 191 mg/dL (06-09-19 @ 23:09)  POCT Blood Glucose.: 167 mg/dL (06-09-19 @ 17:27)  POCT Blood Glucose.: 167 mg/dL (06-09-19 @ 12:34)  POCT Blood Glucose.: 167 mg/dL (06-09-19 @ 06:29)  POCT Blood Glucose.: 151 mg/dL (06-08-19 @ 23:54)  POCT Blood Glucose.: 194 mg/dL (06-08-19 @ 17:49)  POCT Blood Glucose.: 163 mg/dL (06-08-19 @ 12:15)  POCT Blood Glucose.: 197 mg/dL (06-08-19 @ 05:21)  POCT Blood Glucose.: 141 mg/dL (06-08-19 @ 00:37)  POCT Blood Glucose.: 115 mg/dL (06-07-19 @ 17:44)      06-10    147<H>  |  104  |  33<H>  ----------------------------<  250<H>  4.1   |  28  |  1.31<H>    EGFR if : 48  EGFR if non : 41    Ca    8.5      06-10  Mg     2.3     06-10  Phos  4.7     06-10    TPro  5.8<L>  /  Alb  3.2<L>  /  TBili  0.3  /  DBili  x   /  AST  11  /  ALT  10  /  AlkPhos  47  06-10          Thyroid Function Tests:  06-06 @ 17:06 TSH 3.70 FreeT4 -- T3 -- Anti TPO -- Anti Thyroglobulin Ab -- TSI --      Hemoglobin A1C, Whole Blood: 8.9 % <H> [4.0 - 5.6] (06-08-19 @ 05:20) Chief Complaint: T2DM, rule out AI    History: Patient continues to receive decadron. She is hypertensive. Na 147 K 4.1. Also noted to be hyperglycemic in 200s.     MEDICATIONS  (STANDING):  benztropine 0.5 milliGRAM(s) Oral two times a day  chlorhexidine 0.12% Liquid 15 milliLiter(s) Oral Mucosa two times a day  chlorhexidine 4% Liquid 1 Application(s) Topical <User Schedule>  citalopram Solution 20 milliGRAM(s) Enteral Tube daily  dexamethasone  Injectable 4 milliGRAM(s) IV Push every 6 hours  dextrose 5%. 1000 milliLiter(s) (50 mL/Hr) IV Continuous <Continuous>  dextrose 50% Injectable 12.5 Gram(s) IV Push once  dextrose 50% Injectable 25 Gram(s) IV Push once  dextrose 50% Injectable 25 Gram(s) IV Push once  hydrALAZINE 10 milliGRAM(s) Oral every 8 hours  insulin lispro (HumaLOG) corrective regimen sliding scale   SubCutaneous every 6 hours  pantoprazole  Injectable 40 milliGRAM(s) IV Push daily  piperacillin/tazobactam IVPB. 3.375 Gram(s) IV Intermittent every 8 hours  risperiDONE   Solution 3 milliGRAM(s) Oral two times a day  valproic  acid Syrup 500 milliGRAM(s) Oral three times a day    MEDICATIONS  (PRN):  dextrose 40% Gel 15 Gram(s) Oral once PRN Blood Glucose LESS THAN 70 milliGRAM(s)/deciliter  glucagon  Injectable 1 milliGRAM(s) IntraMuscular once PRN Glucose LESS THAN 70 milligrams/deciliter      Allergies    No Known Allergies    Intolerances      Review of Systems:      UNABLE TO OBTAIN    PHYSICAL EXAM:  VITALS: T(C): 37 (06-10-19 @ 12:00)  T(F): 98.6 (06-10-19 @ 12:00), Max: 98.9 (06-09-19 @ 16:00)  HR: 50 (06-10-19 @ 13:00) (34 - 74)  BP: 182/66 (06-10-19 @ 13:00) (115/51 - 195/81)  RR:  (9 - 28)  SpO2:  (98% - 100%)  Wt(kg): --  GENERAL: NAD, intubated  EYES: No proptosis, no lid lag, anicteric  HEENT: intubated +NG tube  THYROID: Normal size, no palpable nodules  RESPIRATORY: Clear to auscultation bilaterally; No rales, rhonchi, wheezing, or rubs  CARDIOVASCULAR: Regular rate and rhythm; No murmurs; no peripheral edema  GI: Soft, nontender, non distended, normal bowel sounds  SKIN: Dry, intact, No rashes or lesions  PSYCH: Alert and oriented x 1      POCT Blood Glucose.: 241 mg/dL (06-10-19 @ 12:00)  POCT Blood Glucose.: 234 mg/dL (06-10-19 @ 05:29)  POCT Blood Glucose.: 243 mg/dL (06-10-19 @ 05:27)  POCT Blood Glucose.: 205 mg/dL (06-10-19 @ 05:20)  POCT Blood Glucose.: 191 mg/dL (06-09-19 @ 23:09)  POCT Blood Glucose.: 167 mg/dL (06-09-19 @ 17:27)  POCT Blood Glucose.: 167 mg/dL (06-09-19 @ 12:34)  POCT Blood Glucose.: 167 mg/dL (06-09-19 @ 06:29)  POCT Blood Glucose.: 151 mg/dL (06-08-19 @ 23:54)  POCT Blood Glucose.: 194 mg/dL (06-08-19 @ 17:49)  POCT Blood Glucose.: 163 mg/dL (06-08-19 @ 12:15)  POCT Blood Glucose.: 197 mg/dL (06-08-19 @ 05:21)  POCT Blood Glucose.: 141 mg/dL (06-08-19 @ 00:37)  POCT Blood Glucose.: 115 mg/dL (06-07-19 @ 17:44)      06-10    147<H>  |  104  |  33<H>  ----------------------------<  250<H>  4.1   |  28  |  1.31<H>    EGFR if : 48  EGFR if non : 41    Ca    8.5      06-10  Mg     2.3     06-10  Phos  4.7     06-10    TPro  5.8<L>  /  Alb  3.2<L>  /  TBili  0.3  /  DBili  x   /  AST  11  /  ALT  10  /  AlkPhos  47  06-10          Thyroid Function Tests:  06-06 @ 17:06 TSH 3.70 FreeT4 -- T3 -- Anti TPO -- Anti Thyroglobulin Ab -- TSI --      Hemoglobin A1C, Whole Blood: 8.9 % <H> [4.0 - 5.6] (06-08-19 @ 05:20)

## 2019-06-11 LAB
ACTH SER-ACNC: 1.7 PG/ML — LOW (ref 7.2–63.3)
ACTH SER-ACNC: < 1.5 PG/ML — LOW (ref 7.2–63.3)
ACTH SER-ACNC: < 1.5 PG/ML — LOW (ref 7.2–63.3)
ANION GAP SERPL CALC-SCNC: 12 MMO/L — SIGNIFICANT CHANGE UP (ref 7–14)
BACTERIA BLD CULT: SIGNIFICANT CHANGE UP
BACTERIA BLD CULT: SIGNIFICANT CHANGE UP
BASOPHILS # BLD AUTO: 0 K/UL — SIGNIFICANT CHANGE UP (ref 0–0.2)
BASOPHILS NFR BLD AUTO: 0 % — SIGNIFICANT CHANGE UP (ref 0–2)
BUN SERPL-MCNC: 29 MG/DL — HIGH (ref 7–23)
CALCIUM SERPL-MCNC: 8.7 MG/DL — SIGNIFICANT CHANGE UP (ref 8.4–10.5)
CHLORIDE SERPL-SCNC: 104 MMOL/L — SIGNIFICANT CHANGE UP (ref 98–107)
CO2 SERPL-SCNC: 30 MMOL/L — SIGNIFICANT CHANGE UP (ref 22–31)
CREAT SERPL-MCNC: 0.88 MG/DL — SIGNIFICANT CHANGE UP (ref 0.5–1.3)
EOSINOPHIL # BLD AUTO: 0 K/UL — SIGNIFICANT CHANGE UP (ref 0–0.5)
EOSINOPHIL NFR BLD AUTO: 0 % — SIGNIFICANT CHANGE UP (ref 0–6)
GLUCOSE SERPL-MCNC: 156 MG/DL — HIGH (ref 70–99)
HCT VFR BLD CALC: 31 % — LOW (ref 34.5–45)
HGB BLD-MCNC: 9.7 G/DL — LOW (ref 11.5–15.5)
IMM GRANULOCYTES NFR BLD AUTO: 0.4 % — SIGNIFICANT CHANGE UP (ref 0–1.5)
LYMPHOCYTES # BLD AUTO: 0.63 K/UL — LOW (ref 1–3.3)
LYMPHOCYTES # BLD AUTO: 13.5 % — SIGNIFICANT CHANGE UP (ref 13–44)
MAGNESIUM SERPL-MCNC: 2.3 MG/DL — SIGNIFICANT CHANGE UP (ref 1.6–2.6)
MCHC RBC-ENTMCNC: 29 PG — SIGNIFICANT CHANGE UP (ref 27–34)
MCHC RBC-ENTMCNC: 31.3 % — LOW (ref 32–36)
MCV RBC AUTO: 92.8 FL — SIGNIFICANT CHANGE UP (ref 80–100)
MONOCYTES # BLD AUTO: 0.41 K/UL — SIGNIFICANT CHANGE UP (ref 0–0.9)
MONOCYTES NFR BLD AUTO: 8.8 % — SIGNIFICANT CHANGE UP (ref 2–14)
NEUTROPHILS # BLD AUTO: 3.6 K/UL — SIGNIFICANT CHANGE UP (ref 1.8–7.4)
NEUTROPHILS NFR BLD AUTO: 77.3 % — HIGH (ref 43–77)
NRBC # FLD: 0.07 K/UL — SIGNIFICANT CHANGE UP (ref 0–0)
NRBC FLD-RTO: 1.5 — SIGNIFICANT CHANGE UP
PHOSPHATE SERPL-MCNC: 4 MG/DL — SIGNIFICANT CHANGE UP (ref 2.5–4.5)
PLATELET # BLD AUTO: 75 K/UL — LOW (ref 150–400)
PMV BLD: 14.2 FL — HIGH (ref 7–13)
POTASSIUM SERPL-MCNC: 4.2 MMOL/L — SIGNIFICANT CHANGE UP (ref 3.5–5.3)
POTASSIUM SERPL-SCNC: 4.2 MMOL/L — SIGNIFICANT CHANGE UP (ref 3.5–5.3)
RBC # BLD: 3.34 M/UL — LOW (ref 3.8–5.2)
RBC # FLD: 14 % — SIGNIFICANT CHANGE UP (ref 10.3–14.5)
SODIUM SERPL-SCNC: 146 MMOL/L — HIGH (ref 135–145)
WBC # BLD: 4.66 K/UL — SIGNIFICANT CHANGE UP (ref 3.8–10.5)
WBC # FLD AUTO: 4.66 K/UL — SIGNIFICANT CHANGE UP (ref 3.8–10.5)

## 2019-06-11 PROCEDURE — 99232 SBSQ HOSP IP/OBS MODERATE 35: CPT

## 2019-06-11 PROCEDURE — 99291 CRITICAL CARE FIRST HOUR: CPT

## 2019-06-11 RX ORDER — HEPARIN SODIUM 5000 [USP'U]/ML
5000 INJECTION INTRAVENOUS; SUBCUTANEOUS EVERY 8 HOURS
Refills: 0 | Status: DISCONTINUED | OUTPATIENT
Start: 2019-06-11 | End: 2019-06-18

## 2019-06-11 RX ORDER — HYDRALAZINE HCL 50 MG
10 TABLET ORAL ONCE
Refills: 0 | Status: COMPLETED | OUTPATIENT
Start: 2019-06-11 | End: 2019-06-11

## 2019-06-11 RX ORDER — HYDRALAZINE HCL 50 MG
25 TABLET ORAL EVERY 8 HOURS
Refills: 0 | Status: DISCONTINUED | OUTPATIENT
Start: 2019-06-11 | End: 2019-06-12

## 2019-06-11 RX ADMIN — RISPERIDONE 3 MILLIGRAM(S): 4 TABLET ORAL at 17:07

## 2019-06-11 RX ADMIN — Medication 1: at 00:14

## 2019-06-11 RX ADMIN — Medication 25 MILLIGRAM(S): at 14:23

## 2019-06-11 RX ADMIN — Medication 10 MILLIGRAM(S): at 10:30

## 2019-06-11 RX ADMIN — Medication 500 MILLIGRAM(S): at 08:28

## 2019-06-11 RX ADMIN — PANTOPRAZOLE SODIUM 40 MILLIGRAM(S): 20 TABLET, DELAYED RELEASE ORAL at 11:44

## 2019-06-11 RX ADMIN — Medication 10 MILLIGRAM(S): at 05:43

## 2019-06-11 RX ADMIN — Medication 0.5 MILLIGRAM(S): at 17:02

## 2019-06-11 RX ADMIN — HEPARIN SODIUM 5000 UNIT(S): 5000 INJECTION INTRAVENOUS; SUBCUTANEOUS at 21:23

## 2019-06-11 RX ADMIN — PIPERACILLIN AND TAZOBACTAM 25 GRAM(S): 4; .5 INJECTION, POWDER, LYOPHILIZED, FOR SOLUTION INTRAVENOUS at 14:22

## 2019-06-11 RX ADMIN — Medication 500 MILLIGRAM(S): at 17:02

## 2019-06-11 RX ADMIN — RISPERIDONE 3 MILLIGRAM(S): 4 TABLET ORAL at 05:43

## 2019-06-11 RX ADMIN — Medication 0.5 MILLIGRAM(S): at 05:43

## 2019-06-11 RX ADMIN — HEPARIN SODIUM 5000 UNIT(S): 5000 INJECTION INTRAVENOUS; SUBCUTANEOUS at 14:33

## 2019-06-11 RX ADMIN — CHLORHEXIDINE GLUCONATE 1 APPLICATION(S): 213 SOLUTION TOPICAL at 05:46

## 2019-06-11 RX ADMIN — PIPERACILLIN AND TAZOBACTAM 25 GRAM(S): 4; .5 INJECTION, POWDER, LYOPHILIZED, FOR SOLUTION INTRAVENOUS at 05:45

## 2019-06-11 RX ADMIN — PIPERACILLIN AND TAZOBACTAM 25 GRAM(S): 4; .5 INJECTION, POWDER, LYOPHILIZED, FOR SOLUTION INTRAVENOUS at 21:24

## 2019-06-11 RX ADMIN — Medication 500 MILLIGRAM(S): at 23:54

## 2019-06-11 RX ADMIN — CHLORHEXIDINE GLUCONATE 15 MILLILITER(S): 213 SOLUTION TOPICAL at 17:03

## 2019-06-11 RX ADMIN — CHLORHEXIDINE GLUCONATE 15 MILLILITER(S): 213 SOLUTION TOPICAL at 05:43

## 2019-06-11 RX ADMIN — CITALOPRAM 20 MILLIGRAM(S): 10 TABLET, FILM COATED ORAL at 11:44

## 2019-06-11 RX ADMIN — Medication 1: at 11:43

## 2019-06-11 RX ADMIN — Medication 500 MILLIGRAM(S): at 00:14

## 2019-06-11 RX ADMIN — Medication 25 MILLIGRAM(S): at 21:24

## 2019-06-11 NOTE — PROGRESS NOTE ADULT - ATTENDING COMMENTS
Meghana Landers MD  Pager [phone] ([facility])/ [phone] ([facility]) [please provide 10 digit call back number]  Nights and weekends: [phone]  Please note that this patient may be followed by a different provider tomorrow. If no answer or after hours, please contact [phone].  For final dc reccomendations, please call [phone] or page the endocrine fellow on call.

## 2019-06-11 NOTE — PROGRESS NOTE ADULT - ATTENDING COMMENTS
69 year old woman with schizoaffective disorder, dementia, HTN, DM2, brought in by family with weakness and AMS, found to be hypothermic, bradycardic and hypoglycemic etiology thought to be sepsis +/- adrenal insufficiency    - vital signs improved with antibiotics and stress dose steroids  - extubated last night doing well on nasal cannula  - follows simple commands but still lethargic  - follow up endocrine recommendations   - on broad spectrum ABx cultures remain negative to date    cc time spent 35 min

## 2019-06-11 NOTE — PROGRESS NOTE ADULT - ASSESSMENT
Patient is a 69 year old woman with history of HTN, dementia, schizoaffective disorder presents with lethargy and altered mental status s/p intubation for hypercarbic respiratory failure, also found to have hypothermia, bradycardia and pancytopenia of unclear etiology.     # Neuro:   - History of dementia  - Was sedated on Fentanyl  - Currently off sedation     # Psych:  - History of schizoaffective disorder  - Home medication include: Citalopram 20 mg QD, Risperidone 3 mg QD, Valproic acid 1500 mg QHS, Benztropine 0.5 mg QD  - Continue with home medication Citalopram 20 mg QD, Risperidone 3 mg QD, Valproic acid 500 mg TID, Benztropine 0.5 mg QD    # CV:  - Intermittent bradycardia off pressors  - Shock state could be distributive in the setting of sepsis  - Workup for infection negative till date  - Zosyn for possible aspiration pneumonia. Plan for 7-day course. End date 6/12  - Repeat cosyntropin stimulation test today was negative for adrenal insufficiency per endocrine  - Continue to monitor hemodynamics.     # Resp:  - Intubated on 6/6 for hypercarbic respiratory failure and extubated on 6/11  - Previous chest xray on 06/07 showing small left pleural effusion, left basilar opacity atelectasis vs pneumonia  - continue zosyn for possible aspiration PNA (last day 6/12)    # GI  - No issues   - Continue with PPI IV  - resume home diet     # Renal  - Cr now trending up 0.55 on admission. 1.31 today  - Likely pre-renal in the setting of sepsis/adrenal insufficiency   - Monitor UOP    # Endo:  - T2DM with A1C 8.9  - Not on home medications   - Started Lantus 9U, Humalog 3U TID, SSI  - Monitor FS  - Endo recs    Problem: Adrenal insufficiency   - repeat ACTH stim test negative --> rules out adrenal insufficiency  - dexamethasone discontinued yesterday evening  - monitor hemodynamics and in the setting of acute decompensation, can restart IV steroids     # Heme  - Pancytopenia on admission   - For leukopenia, resolved  - For anemia, Hb now stable at 9.7, no signs of active bleeding, will check iron study, retic count for further evaluation   - For thrombocytopenia, platelet now downtrending 35 K, no petechiae no gingival bleeding, etiology of thrombocytopenia could be in the setting of sepsis vs medication (Depakote)  - Continue to monitor CBC    # ID  - Leukopenia now resolved  - s/p ceftriaxone in the ED   - Urine legionella negative, Azithromycin and Vancomycin discontinued on 6/8  - Continue with Zosyn for aspiration pneumonia (6/6-) until 6/12    # DVT ppx  - SCD (no pharm VTE due to thrombocytopenia)      Radha Haskins  PGY 2 MICU Resident Patient is a 69 year old woman with history of HTN, dementia, schizoaffective disorder presents with lethargy and altered mental status s/p intubation for hypercarbic respiratory failure, also found to have hypothermia, bradycardia and pancytopenia of unclear etiology.     # Neuro:   - History of dementia  - appears to be closer to baseline mental status at this time     # Psych:  - History of schizoaffective disorder  - continue home medications including Citalopram 20 mg QD, Risperidone 3 mg QD, Valproic acid 1500 mg QHS, Benztropine 0.5 mg QD    # CV:  - Intermittent bradycardia off pressors  - Shock state could be distributive in the setting of sepsis however infectious workup is negative till date   - Repeat cosyntropin stimulation test was negative for adrenal insufficiency per endocrine  - Continue to monitor hemodynamics.     # Resp:  - Intubated on 6/6 for hypercarbic respiratory failure and extubated on 6/11  - Previous chest xray on 06/07 showing small left pleural effusion, left basilar opacity atelectasis vs pneumonia  - continue zosyn for 7 day course for possible aspiration PNA (last day 6/12)    # GI  - No issues   - Continue with PPI IV  - given history of dysphagia, will continue tube feeds for now and order official S+S evaluation     # Renal  - Cr 0.88 --> downtrended from 1.33  - Likely pre-renal in the setting of sepsis/adrenal insufficiency   - Monitor UOP    # Endo:  - T2DM with A1C 8.9 but not on medications at home  - per endocrine, moderate dose ISS q6 hours for now as they expect hyperglycemia will improve with discontinuation of steroids     Problem: Adrenal insufficiency   - repeat ACTH stim test negative and per endocrine, rules out adrenal insufficiency however endocrine would like repeat ACTH stim test in 48 hours once steroids are completely out of patient's system   - dexamethasone discontinued yesterday evening  - monitor hemodynamics and in the setting of acute decompensation, can restart IV steroids     # Heme  - Pancytopenia on admission   - For leukopenia, resolved  - For anemia, Hb now stable at 9.7, no signs of active bleeding, will check iron study, retic count for further evaluation   - thrombocytopenia may be secondary to depakote use versus infection  - uptrending at this time so will restart heparin for DVT ppx  - Continue to monitor CBC    # ID  - Leukopenia now resolved  - s/p ceftriaxone in the ED   - Urine legionella negative, Azithromycin and Vancomycin discontinued on 6/8  - Continue with Zosyn for aspiration pneumonia (6/6-) until 6/12 ( 7 daty course totoal)    # DVT ppx  - restarted heparin subq for DVT ppx       Radha Haskins  PGY 2 MICU Resident Patient is a 69 year old woman with history of HTN, dementia, schizoaffective disorder presents with lethargy and altered mental status s/p intubation for hypercarbic respiratory failure, also found to have hypothermia, bradycardia and pancytopenia of unclear etiology.     # Neuro:   - History of dementia and AAOx2 at baseline per daughter   - usually is conversant and able to speak with her family members and recognizes them   - can do her ADLs independently  - mental status is slowly improving but not at baseline --> may have encephalopathy secondary to acute sepsis  - continue to monitor and if persistently lethargic, may need to consider alternate etiologies for AMS    # Psych:  - History of schizoaffective disorder  - continue home medications including Citalopram 20 mg QD, Risperidone 3 mg BID, Valproic acid 1500 mg QHS, Benztropine 0.5 mg QD    # CV:  - Intermittent bradycardia off pressors  - Shock state could be distributive in the setting of sepsis however infectious workup is negative till date   - Repeat cosyntropin stimulation test was negative for adrenal insufficiency per endocrine  - persistently hypertensive to the 170s-180s --> hydralazine 10mg TID uptitrated to 25mg TID with better control  - if still hypertensive, can consider adding an ACEi or ARB    # Resp:  - Intubated on 6/6 for hypercarbic respiratory failure and extubated on 6/11 onto NC  - Previous chest xray on 06/07 showing small left pleural effusion, left basilar opacity atelectasis vs pneumonia  - continue zosyn for 7 day course for possible aspiration PNA (last day 6/12)    # GI  - No issues   - Continue with PPI IV  - given history of dysphagia, will continue tube feeds for now and order official S+S evaluation     # Renal  - Cr 0.88 --> downtrended from 1.33  - Likely pre-renal in the setting of sepsis/adrenal insufficiency   - Monitor UOP    # Endo:  - T2DM with A1C 8.9 but not on medications at home  - per endocrine, moderate dose ISS q6 hours for now as they expect hyperglycemia will improve with discontinuation of steroids     Problem: Adrenal insufficiency   - repeat ACTH stim test negative and per endocrine, rules out adrenal insufficiency however endocrine would like repeat ACTH stim test in 48 hours once steroids are completely out of patient's system   - dexamethasone discontinued yesterday evening  - monitor hemodynamics and in the setting of acute decompensation, can restart IV steroids     # Heme  - Pancytopenia on admission   - For leukopenia, resolved  - For anemia, Hb now stable at 9.7, no signs of active bleeding, will check iron study, retic count for further evaluation   - thrombocytopenia may be secondary to depakote use versus infection  - uptrending at this time so will restart heparin for DVT ppx  - Continue to monitor CBC    # ID  - Leukopenia now resolved  - s/p ceftriaxone in the ED   - Urine legionella negative, Azithromycin and Vancomycin discontinued on 6/8  - Continue with Zosyn for aspiration pneumonia (6/6-) until 6/12 ( 7 daty course totoal)    # DVT ppx  - restarted heparin subq for DVT ppx       Radha Haskins  PGY 2 MICU Resident

## 2019-06-11 NOTE — PROGRESS NOTE ADULT - PROBLEM SELECTOR PLAN 2
-Expect hyperglycemia to improve as steroids will be discontinued  -on tube feeds- Keep on low dose correctional scale q 6 hrs.   -on discharge, can discahrge on metformin (depending on CrCl/LFTS) vs DPP-4 Inhibitor (renally dosed)  A1C GOAL 7.5-8.0

## 2019-06-11 NOTE — CHART NOTE - NSCHARTNOTEFT_GEN_A_CORE
MICU Transfer Note    Transfer from: MICU  Transfer to:  (  ) Medicine    (  ) Telemetry    (  ) RCU    (  ) Palliative    (  ) Stroke Unit    (  ) _______________  Accepting physican:      MICU COURSE:    69F PMH HTN, dementia, schizoaffective disorder presents with lethargy and altered mental status. History provided by daughter at bedside. She has been feeling week for the past week and unable to swallow her medications. She was seen at an OSH recently and was started on HCTX for leg swelling. This morning, patient became confused with slurred speech. Patient could not recognize daughter so she was concern and brought her to the ED. Endorses cough for 1 day. Denies fever, SOB, nausea/vomiting, dysuria. No sick contacts, no recent travels.     ED course  - Ceftriaxone 1g  - PCO2 72  - 3L IVF given   - Intubated for hypercarbic respiratory failure     While in the MICU the patient was evaluated for possible adrenal insufficiency. She received pushes of dexamethasone and it seemed as if she did not have an appropriate response initially so she was placed on hydrocortisone standing. She required minimal support on the ventilator and she was extubated on 6/10. Her hypothermia resolved during her first day here. She was bradycardic at NSR with a rate that would go down to the 30s, but would always maintain a blood pressure and did not require atropine or other therapies. She returned to her baseline after extubation and was made eligible to the floors.      ASSESSMENT & PLAN:     Patient is a 69 year old woman with history of HTN, dementia, schizoaffective disorder presents with lethargy and altered mental status s/p intubation for hypercarbic respiratory failure, also found to have hypothermia, bradycardia and pancytopenia of unclear etiology.     # Neuro:   - History of dementia and AAOx2 at baseline per daughter   - usually is conversant and able to speak with her family members and recognizes them   - can do her ADLs independently  - mental status is slowly improving but not at baseline --> may have encephalopathy secondary to acute sepsis  - continue to monitor and if persistently lethargic, may need to consider alternate etiologies for AMS    # Psych:  - History of schizoaffective disorder  - continue home medications including Citalopram 20 mg QD, Risperidone 3 mg BID, Valproic acid 1500 mg QHS, Benztropine 0.5 mg QD    # CV:  - Intermittent bradycardia off pressors  - Shock state could be distributive in the setting of sepsis however infectious workup is negative till date   - Repeat cosyntropin stimulation test was negative for adrenal insufficiency per endocrine  - persistently hypertensive to the 170s-180s --> hydralazine 10mg TID uptitrated to 25mg TID with better control  - if still hypertensive, can consider adding an ACEi or ARB    # Resp:  - Intubated on 6/6 for hypercarbic respiratory failure and extubated on 6/11 onto NC  - Previous chest xray on 06/07 showing small left pleural effusion, left basilar opacity atelectasis vs pneumonia  - continue zosyn for 7 day course for possible aspiration PNA (last day 6/12)    # GI  - No issues   - Continue with PPI IV  - given history of dysphagia, will continue tube feeds for now and order official S+S evaluation     # Renal  - Cr 0.88 --> downtrended from 1.33  - Likely pre-renal in the setting of sepsis/adrenal insufficiency   - Monitor UOP    # Endo:  - T2DM with A1C 8.9 but not on medications at home  - per endocrine, moderate dose ISS q6 hours for now as they expect hyperglycemia will improve with discontinuation of steroids     Problem: Adrenal insufficiency   - repeat ACTH stim test negative and per endocrine, rules out adrenal insufficiency however endocrine would like repeat ACTH stim test in 48 hours once steroids are completely out of patient's system   - dexamethasone discontinued yesterday evening  - monitor hemodynamics and in the setting of acute decompensation, can restart IV steroids     # Heme  - Pancytopenia on admission   - For leukopenia, resolved  - For anemia, Hb now stable at 9.7, no signs of active bleeding, will check iron study, retic count for further evaluation   - thrombocytopenia may be secondary to depakote use versus infection  - uptrending at this time so will restart heparin for DVT ppx  - Continue to monitor CBC    # ID  - Leukopenia now resolved  - s/p ceftriaxone in the ED   - Urine legionella negative, Azithromycin and Vancomycin discontinued on 6/8  - Continue with Zosyn for aspiration pneumonia (6/6-) until 6/12 ( 7 daty course totoal)    # DVT ppx  - restarted heparin subq for DVT ppx     For Follow-Up:  1. Continue zosyn until 6/12 for possible PNA.  2. Continue home psych meds  3. F/U endocrine recs.      Vital Signs Last 24 Hrs  T(C): 36.9 (11 Jun 2019 20:00), Max: 36.9 (11 Jun 2019 20:00)  T(F): 98.5 (11 Jun 2019 20:00), Max: 98.5 (11 Jun 2019 20:00)  HR: 63 (11 Jun 2019 20:00) (36 - 74)  BP: 131/55 (11 Jun 2019 20:00) (105/51 - 198/72)  BP(mean): 77 (11 Jun 2019 20:00) (67 - 108)  RR: 12 (11 Jun 2019 20:00) (10 - 21)  SpO2: 99% (11 Jun 2019 20:00) (96% - 100%)  I&O's Summary    10 Edwin 2019 07:01  -  11 Jun 2019 07:00  --------------------------------------------------------  IN: 700 mL / OUT: 200 mL / NET: 500 mL    11 Jun 2019 07:01  -  11 Jun 2019 20:42  --------------------------------------------------------  IN: 330 mL / OUT: 750 mL / NET: -420 mL          MEDICATIONS  (STANDING):  benztropine 0.5 milliGRAM(s) Oral two times a day  chlorhexidine 0.12% Liquid 15 milliLiter(s) Oral Mucosa two times a day  chlorhexidine 4% Liquid 1 Application(s) Topical <User Schedule>  citalopram Solution 20 milliGRAM(s) Enteral Tube daily  dextrose 5%. 1000 milliLiter(s) (50 mL/Hr) IV Continuous <Continuous>  dextrose 50% Injectable 12.5 Gram(s) IV Push once  dextrose 50% Injectable 25 Gram(s) IV Push once  dextrose 50% Injectable 25 Gram(s) IV Push once  heparin  Injectable 5000 Unit(s) SubCutaneous every 8 hours  hydrALAZINE 25 milliGRAM(s) Oral every 8 hours  insulin lispro (HumaLOG) corrective regimen sliding scale   SubCutaneous every 6 hours  pantoprazole  Injectable 40 milliGRAM(s) IV Push daily  piperacillin/tazobactam IVPB. 3.375 Gram(s) IV Intermittent every 8 hours  risperiDONE   Solution 3 milliGRAM(s) Oral two times a day  valproic  acid Syrup 500 milliGRAM(s) Oral three times a day    MEDICATIONS  (PRN):  dextrose 40% Gel 15 Gram(s) Oral once PRN Blood Glucose LESS THAN 70 milliGRAM(s)/deciliter  glucagon  Injectable 1 milliGRAM(s) IntraMuscular once PRN Glucose LESS THAN 70 milligrams/deciliter        LABS                                            9.7                   Neurophils% (auto):   77.3   (06-11 @ 03:21):    4.66 )-----------(75           Lymphocytes% (auto):  13.5                                          31.0                   Eosinphils% (auto):   0.0      Manual%: Neutrophils x    ; Lymphocytes x    ; Eosinophils x    ; Bands%: x    ; Blasts x                                    146    |  104    |  29                  Calcium: 8.7   / iCa: x      (06-11 @ 03:21)    ----------------------------<  156       Magnesium: 2.3                              4.2     |  30     |  0.88             Phosphorous: 4.0

## 2019-06-11 NOTE — PROGRESS NOTE ADULT - SUBJECTIVE AND OBJECTIVE BOX
Chief Complaint/Follow-up on:   concern for AI    Subjective:  extubated today   HD stable off steroids   no hypotension noted  infact is on hydralzine now     MEDICATIONS  (STANDING):  benztropine 0.5 milliGRAM(s) Oral two times a day  chlorhexidine 0.12% Liquid 15 milliLiter(s) Oral Mucosa two times a day  chlorhexidine 4% Liquid 1 Application(s) Topical <User Schedule>  citalopram Solution 20 milliGRAM(s) Enteral Tube daily  dextrose 5%. 1000 milliLiter(s) (50 mL/Hr) IV Continuous <Continuous>  dextrose 50% Injectable 12.5 Gram(s) IV Push once  dextrose 50% Injectable 25 Gram(s) IV Push once  dextrose 50% Injectable 25 Gram(s) IV Push once  heparin  Injectable 5000 Unit(s) SubCutaneous every 8 hours  hydrALAZINE 25 milliGRAM(s) Oral every 8 hours  insulin lispro (HumaLOG) corrective regimen sliding scale   SubCutaneous every 6 hours  pantoprazole  Injectable 40 milliGRAM(s) IV Push daily  piperacillin/tazobactam IVPB. 3.375 Gram(s) IV Intermittent every 8 hours  risperiDONE   Solution 3 milliGRAM(s) Oral two times a day  valproic  acid Syrup 500 milliGRAM(s) Oral three times a day    MEDICATIONS  (PRN):  dextrose 40% Gel 15 Gram(s) Oral once PRN Blood Glucose LESS THAN 70 milliGRAM(s)/deciliter  glucagon  Injectable 1 milliGRAM(s) IntraMuscular once PRN Glucose LESS THAN 70 milligrams/deciliter      PHYSICAL EXAM:  VITALS: T(C): 36.9 (06-11-19 @ 20:00)  T(F): 98.5 (06-11-19 @ 20:00), Max: 98.5 (06-11-19 @ 20:00)  HR: 63 (06-11-19 @ 20:00) (36 - 74)  BP: 131/55 (06-11-19 @ 20:00) (105/51 - 198/72)  RR:  (10 - 21)  SpO2:  (96% - 100%)  Wt(kg): --  GENERAL: sleeping NAD, extubated   EYES: No proptosis, no injection  HEENT:  Atraumatic, Normocephalic, moist mucous membranes  RESPIRATORY: Clear to auscultation bilaterally; No rales, rhonchi, wheezing, or rubs  CARDIOVASCULAR: Regular rate and rhythm; No murmurs; no peripheral edema        POCT Blood Glucose.: 109 mg/dL (06-11-19 @ 17:10)  POCT Blood Glucose.: 179 mg/dL (06-11-19 @ 11:39)  POCT Blood Glucose.: 113 mg/dL (06-11-19 @ 05:41)  POCT Blood Glucose.: 163 mg/dL (06-11-19 @ 00:09)  POCT Blood Glucose.: 230 mg/dL (06-10-19 @ 17:25)  POCT Blood Glucose.: 241 mg/dL (06-10-19 @ 12:00)  POCT Blood Glucose.: 234 mg/dL (06-10-19 @ 05:29)  POCT Blood Glucose.: 243 mg/dL (06-10-19 @ 05:27)  POCT Blood Glucose.: 205 mg/dL (06-10-19 @ 05:20)  POCT Blood Glucose.: 191 mg/dL (06-09-19 @ 23:09)  POCT Blood Glucose.: 167 mg/dL (06-09-19 @ 17:27)  POCT Blood Glucose.: 167 mg/dL (06-09-19 @ 12:34)  POCT Blood Glucose.: 167 mg/dL (06-09-19 @ 06:29)  POCT Blood Glucose.: 151 mg/dL (06-08-19 @ 23:54)    06-11    146<H>  |  104  |  29<H>  ----------------------------<  156<H>  4.2   |  30  |  0.88    EGFR if : 77  EGFR if non : 67    Ca    8.7      06-11  Mg     2.3     06-11  Phos  4.0     06-11    TPro  5.8<L>  /  Alb  3.2<L>  /  TBili  0.3  /  DBili  x   /  AST  11  /  ALT  10  /  AlkPhos  47  06-10          Thyroid Function Tests:  06-06 @ 17:06 TSH 3.70 FreeT4 -- T3 -- Anti TPO -- Anti Thyroglobulin Ab -- TSI --      Hemoglobin A1C, Whole Blood: 8.9 % <H> [4.0 - 5.6] (06-08-19 @ 05:20)

## 2019-06-11 NOTE — PROGRESS NOTE ADULT - ASSESSMENT
68 yo female with history of T2DM uncontrolled (HbA1c 8.9%) without known complications, schizoaffective d/o and hypertension who presents with a week of lethargy, endocrine consulted for a low cortisol started on steroids, now off, HD stable.

## 2019-06-11 NOTE — CHART NOTE - NSCHARTNOTEFT_GEN_A_CORE
Patient is a 69y old  Female who presents with a chief complaint of Lethargy      HPI:  69F PMH HTN, dementia, schizoaffective disorder presents with lethargy and altered mental status. History provided by daughter at bedside. She has been feeling week for the past week and unable to swallow her medications. She was seen at an OSH recently and was started on HCTX for leg swelling. This morning, patient became confused with slurred speech. Patient could not recognize daughter so she was concern and brought her to the ED. Endorses cough for 1 day. Denies fever, SOB, nausea/vomiting, dysuria. No sick contacts, no recent travels.     ED course  - Ceftriaxone 1g  - PCO2 72  - 3L IVF given   - Intubated for hypercarbic respiratory failure     While in the MICU the patient was evaluated for possible adrenal insufficiency. She received pushes of dexamethasone and it seemed as if she did not have an appropriate response initially so she was placed on hydrocortisone standing. She required minimal support on the ventilator and she was extubated on 6/10. Her hypothermia resolved during her first day here. She was bradycardic at NSR with a rate that would go down to the 30s, but would always maintain a blood pressure and did not require atropine or other therapies. She returned to her baseline after extubation and was made eligible to the floors.    FOR FOLLOW- UP:    1. Continue zosyn until 6/12 for 7 day course for possible PNA.  2. Continue home psych meds  3. F/U endocrine recs.  4. Monitor blood pressure and bradycardia- bradycardia has not necessitated any interventions thus far.     Blanca Ospina MD PGY-3  MAR [id number]

## 2019-06-11 NOTE — PROGRESS NOTE ADULT - PROBLEM SELECTOR PLAN 1
Patient had ACTH stim test performed on DEX.  Baseline cortisol was not obtained but early AM cortisol was 6.3, 30 min after cosyntropin was 24 and 60 min afterwards was 31.   DEX unlike hydrocort, does not affect ACTH stim assay so it appears as if patient appropriately responded.   Can r/o primary AI with above results. yesterday with marked HTN to 180s, decardron dced and now pt is on antiHTN. this also suggests AI much less likely as pt is now in anti HTN for high blood pressure.  If, however, patient decompensates and is HD unstable, can restart on hydrocortisone 50mg IV q 8 hours  Cannot check ACTH now on dex but would check in 48-72 hours once DEX effect has worn off  Would repeat ACTH STIM test in 48-72 hours off ALL steroids.   Perform as follows: Draw 8AM baseline cortisol and ACTH, give cosyntropin 250 ug X 1 then check serum cortisol at 30 min and 60 min.

## 2019-06-11 NOTE — PROGRESS NOTE ADULT - SUBJECTIVE AND OBJECTIVE BOX
Chief compliant: Lethargy, AMS    INTERVAL HPI/OVERNIGHT EVENTS:  Patient extubated onto face tent overnight.     SUBJECTIVE: Patient seen and examined at bedside.     CONSTITUTIONAL: No weakness, fevers or chills  EYES/ENT: No visual changes;  No vertigo or throat pain   NECK: No pain or stiffness  RESPIRATORY: No cough, wheezing, hemoptysis; No shortness of breath  CARDIOVASCULAR: No chest pain or palpitations  GASTROINTESTINAL: No abdominal or epigastric pain. No nausea, vomiting, or hematemesis; No diarrhea or constipation. No melena or hematochezia.  GENITOURINARY: No dysuria, frequency or hematuria  NEUROLOGICAL: No numbness or weakness  SKIN: No itching, rashes    OBJECTIVE:    VITAL SIGNS:  ICU Vital Signs Last 24 Hrs  T(C): 35.6 (11 Jun 2019 04:00), Max: 37.2 (10 Edwin 2019 16:00)  T(F): 96 (11 Jun 2019 04:00), Max: 98.9 (10 Edwin 2019 16:00)  HR: 67 (11 Jun 2019 06:00) (36 - 80)  BP: 171/70 (11 Jun 2019 06:00) (125/101 - 195/81)  BP(mean): 100 (11 Jun 2019 06:00) (88 - 133)  ABP: --  ABP(mean): --  RR: 13 (11 Jun 2019 06:00) (9 - 28)  SpO2: 100% (11 Jun 2019 06:00) (98% - 100%)    Mode: standby    06-10 @ 07:01  -  06-11 @ 07:00  --------------------------------------------------------  IN: 700 mL / OUT: 200 mL / NET: 500 mL      CAPILLARY BLOOD GLUCOSE      POCT Blood Glucose.: 113 mg/dL (11 Jun 2019 05:41)      PHYSICAL EXAM:    General: NAD  HEENT: NC/AT; PERRL, clear conjunctiva  Neck: supple  Respiratory: CTA b/l  Cardiovascular: +S1/S2; RRR  Abdomen: soft, NT/ND; +BS x4  Extremities: WWP, 2+ peripheral pulses b/l; no LE edema  Skin: normal color and turgor; no rash  Neurological:    MEDICATIONS:  MEDICATIONS  (STANDING):  benztropine 0.5 milliGRAM(s) Oral two times a day  chlorhexidine 0.12% Liquid 15 milliLiter(s) Oral Mucosa two times a day  chlorhexidine 4% Liquid 1 Application(s) Topical <User Schedule>  citalopram Solution 20 milliGRAM(s) Enteral Tube daily  dextrose 5%. 1000 milliLiter(s) (50 mL/Hr) IV Continuous <Continuous>  dextrose 50% Injectable 12.5 Gram(s) IV Push once  dextrose 50% Injectable 25 Gram(s) IV Push once  dextrose 50% Injectable 25 Gram(s) IV Push once  hydrALAZINE 10 milliGRAM(s) Oral every 8 hours  insulin lispro (HumaLOG) corrective regimen sliding scale   SubCutaneous every 6 hours  insulin lispro Injectable (HumaLOG) 3 Unit(s) SubCutaneous three times a day before meals  pantoprazole  Injectable 40 milliGRAM(s) IV Push daily  piperacillin/tazobactam IVPB. 3.375 Gram(s) IV Intermittent every 8 hours  risperiDONE   Solution 3 milliGRAM(s) Oral two times a day  valproic  acid Syrup 500 milliGRAM(s) Oral three times a day    MEDICATIONS  (PRN):  dextrose 40% Gel 15 Gram(s) Oral once PRN Blood Glucose LESS THAN 70 milliGRAM(s)/deciliter  glucagon  Injectable 1 milliGRAM(s) IntraMuscular once PRN Glucose LESS THAN 70 milligrams/deciliter      ALLERGIES:  Allergies    No Known Allergies    Intolerances        LABS:                        9.7    4.66  )-----------( 75       ( 11 Jun 2019 03:21 )             31.0     06-11    146<H>  |  104  |  29<H>  ----------------------------<  156<H>  4.2   |  30  |  0.88    Ca    8.7      11 Jun 2019 03:21  Phos  4.0     06-11  Mg     2.3     06-11    TPro  5.8<L>  /  Alb  3.2<L>  /  TBili  0.3  /  DBili  x   /  AST  11  /  ALT  10  /  AlkPhos  47  06-10          RADIOLOGY & ADDITIONAL TESTS: Reviewed. Chief compliant: Lethargy, AMS    INTERVAL HPI/OVERNIGHT EVENTS:  Patient extubated onto face tent overnight.     SUBJECTIVE: Patient seen and examined at bedside. She is opening her eyes and shaking her head but only intermittently following commands     Unable to obtain ROS given mental status.     OBJECTIVE:    VITAL SIGNS:  ICU Vital Signs Last 24 Hrs  T(C): 35.6 (11 Jun 2019 04:00), Max: 37.2 (10 Edwin 2019 16:00)  T(F): 96 (11 Jun 2019 04:00), Max: 98.9 (10 Edwin 2019 16:00)  HR: 67 (11 Jun 2019 06:00) (36 - 80)  BP: 171/70 (11 Jun 2019 06:00) (125/101 - 195/81)  BP(mean): 100 (11 Jun 2019 06:00) (88 - 133)  ABP: --  ABP(mean): --  RR: 13 (11 Jun 2019 06:00) (9 - 28)  SpO2: 100% (11 Jun 2019 06:00) (98% - 100%)    Mode: standby    06-10 @ 07:01  -  06-11 @ 07:00  --------------------------------------------------------  IN: 700 mL / OUT: 200 mL / NET: 500 mL      CAPILLARY BLOOD GLUCOSE      POCT Blood Glucose.: 113 mg/dL (11 Jun 2019 05:41)      PHYSICAL EXAM:    General: NAD  HEENT: NC/AT; PERRL, clear conjunctiva  Neck: supple  Respiratory: CTA b/l  Cardiovascular: +S1/S2; RRR  Abdomen: soft, NT/ND; +BS x4  Extremities: WWP, 2+ peripheral pulses b/l; no LE edema  Skin: normal color and turgor; no rash  Neurological:    MEDICATIONS:  MEDICATIONS  (STANDING):  benztropine 0.5 milliGRAM(s) Oral two times a day  chlorhexidine 0.12% Liquid 15 milliLiter(s) Oral Mucosa two times a day  chlorhexidine 4% Liquid 1 Application(s) Topical <User Schedule>  citalopram Solution 20 milliGRAM(s) Enteral Tube daily  dextrose 5%. 1000 milliLiter(s) (50 mL/Hr) IV Continuous <Continuous>  dextrose 50% Injectable 12.5 Gram(s) IV Push once  dextrose 50% Injectable 25 Gram(s) IV Push once  dextrose 50% Injectable 25 Gram(s) IV Push once  hydrALAZINE 10 milliGRAM(s) Oral every 8 hours  insulin lispro (HumaLOG) corrective regimen sliding scale   SubCutaneous every 6 hours  insulin lispro Injectable (HumaLOG) 3 Unit(s) SubCutaneous three times a day before meals  pantoprazole  Injectable 40 milliGRAM(s) IV Push daily  piperacillin/tazobactam IVPB. 3.375 Gram(s) IV Intermittent every 8 hours  risperiDONE   Solution 3 milliGRAM(s) Oral two times a day  valproic  acid Syrup 500 milliGRAM(s) Oral three times a day    MEDICATIONS  (PRN):  dextrose 40% Gel 15 Gram(s) Oral once PRN Blood Glucose LESS THAN 70 milliGRAM(s)/deciliter  glucagon  Injectable 1 milliGRAM(s) IntraMuscular once PRN Glucose LESS THAN 70 milligrams/deciliter      ALLERGIES:  Allergies    No Known Allergies    Intolerances        LABS:                        9.7    4.66  )-----------( 75       ( 11 Jun 2019 03:21 )             31.0     06-11    146<H>  |  104  |  29<H>  ----------------------------<  156<H>  4.2   |  30  |  0.88    Ca    8.7      11 Jun 2019 03:21  Phos  4.0     06-11  Mg     2.3     06-11    TPro  5.8<L>  /  Alb  3.2<L>  /  TBili  0.3  /  DBili  x   /  AST  11  /  ALT  10  /  AlkPhos  47  06-10          RADIOLOGY & ADDITIONAL TESTS: Reviewed. Chief compliant: Lethargy, AMS    INTERVAL HPI/OVERNIGHT EVENTS:  Patient extubated onto face tent overnight.     SUBJECTIVE: Patient seen and examined at bedside. She is opening her eyes and shaking her head but only intermittently following commands     Unable to obtain ROS given mental status.     OBJECTIVE:    VITAL SIGNS:  ICU Vital Signs Last 24 Hrs  T(C): 35.6 (11 Jun 2019 04:00), Max: 37.2 (10 Edwin 2019 16:00)  T(F): 96 (11 Jun 2019 04:00), Max: 98.9 (10 Edwin 2019 16:00)  HR: 67 (11 Jun 2019 06:00) (36 - 80)  BP: 171/70 (11 Jun 2019 06:00) (125/101 - 195/81)  BP(mean): 100 (11 Jun 2019 06:00) (88 - 133)  ABP: --  ABP(mean): --  RR: 13 (11 Jun 2019 06:00) (9 - 28)  SpO2: 100% (11 Jun 2019 06:00) (98% - 100%)    Mode: standby    06-10 @ 07:01  -  06-11 @ 07:00  --------------------------------------------------------  IN: 700 mL / OUT: 200 mL / NET: 500 mL      CAPILLARY BLOOD GLUCOSE      POCT Blood Glucose.: 113 mg/dL (11 Jun 2019 05:41)      PHYSICAL EXAM:    General: NAD  HEENT: NC/AT; PERRL, clear conjunctiva  Neck: supple  Respiratory: grossly CTA b/l  Cardiovascular: +S1/S2; RRR  Abdomen: soft, NT/ND; +BS x4  Extremities: WWP, 2+ peripheral pulses b/l; no LE edema  Skin: normal color and turgor; no rash  Neurological: awake and opening eyes but not following commands consistently    MEDICATIONS:  MEDICATIONS  (STANDING):  benztropine 0.5 milliGRAM(s) Oral two times a day  chlorhexidine 0.12% Liquid 15 milliLiter(s) Oral Mucosa two times a day  chlorhexidine 4% Liquid 1 Application(s) Topical <User Schedule>  citalopram Solution 20 milliGRAM(s) Enteral Tube daily  dextrose 5%. 1000 milliLiter(s) (50 mL/Hr) IV Continuous <Continuous>  dextrose 50% Injectable 12.5 Gram(s) IV Push once  dextrose 50% Injectable 25 Gram(s) IV Push once  dextrose 50% Injectable 25 Gram(s) IV Push once  hydrALAZINE 10 milliGRAM(s) Oral every 8 hours  insulin lispro (HumaLOG) corrective regimen sliding scale   SubCutaneous every 6 hours  insulin lispro Injectable (HumaLOG) 3 Unit(s) SubCutaneous three times a day before meals  pantoprazole  Injectable 40 milliGRAM(s) IV Push daily  piperacillin/tazobactam IVPB. 3.375 Gram(s) IV Intermittent every 8 hours  risperiDONE   Solution 3 milliGRAM(s) Oral two times a day  valproic  acid Syrup 500 milliGRAM(s) Oral three times a day    MEDICATIONS  (PRN):  dextrose 40% Gel 15 Gram(s) Oral once PRN Blood Glucose LESS THAN 70 milliGRAM(s)/deciliter  glucagon  Injectable 1 milliGRAM(s) IntraMuscular once PRN Glucose LESS THAN 70 milligrams/deciliter      ALLERGIES:  Allergies    No Known Allergies    Intolerances        LABS:                        9.7    4.66  )-----------( 75       ( 11 Jun 2019 03:21 )             31.0     06-11    146<H>  |  104  |  29<H>  ----------------------------<  156<H>  4.2   |  30  |  0.88    Ca    8.7      11 Jun 2019 03:21  Phos  4.0     06-11  Mg     2.3     06-11    TPro  5.8<L>  /  Alb  3.2<L>  /  TBili  0.3  /  DBili  x   /  AST  11  /  ALT  10  /  AlkPhos  47  06-10          RADIOLOGY & ADDITIONAL TESTS: Reviewed.

## 2019-06-12 PROCEDURE — 99233 SBSQ HOSP IP/OBS HIGH 50: CPT | Mod: GC

## 2019-06-12 PROCEDURE — 70450 CT HEAD/BRAIN W/O DYE: CPT | Mod: 26

## 2019-06-12 RX ORDER — BENZOCAINE AND MENTHOL 5; 1 G/100ML; G/100ML
1 LIQUID ORAL ONCE
Refills: 0 | Status: COMPLETED | OUTPATIENT
Start: 2019-06-12 | End: 2019-06-12

## 2019-06-12 RX ORDER — HYDRALAZINE HCL 50 MG
25 TABLET ORAL EVERY 8 HOURS
Refills: 0 | Status: DISCONTINUED | OUTPATIENT
Start: 2019-06-12 | End: 2019-06-19

## 2019-06-12 RX ORDER — RISPERIDONE 4 MG/1
3 TABLET ORAL
Refills: 0 | Status: DISCONTINUED | OUTPATIENT
Start: 2019-06-12 | End: 2019-06-19

## 2019-06-12 RX ORDER — VALPROIC ACID (AS SODIUM SALT) 250 MG/5ML
500 SOLUTION, ORAL ORAL THREE TIMES A DAY
Refills: 0 | Status: DISCONTINUED | OUTPATIENT
Start: 2019-06-12 | End: 2019-06-19

## 2019-06-12 RX ORDER — LACTOBACILLUS ACIDOPH-L.BULGARICUS 1 MILLION CELL CHEWABLE TABLET 1MM CELL
1 TABLET,CHEWABLE ORAL DAILY
Refills: 0 | Status: DISCONTINUED | OUTPATIENT
Start: 2019-06-12 | End: 2019-06-12

## 2019-06-12 RX ORDER — BENZTROPINE MESYLATE 1 MG
0.5 TABLET ORAL
Refills: 0 | Status: DISCONTINUED | OUTPATIENT
Start: 2019-06-12 | End: 2019-06-19

## 2019-06-12 RX ORDER — BENZOCAINE AND MENTHOL 5; 1 G/100ML; G/100ML
1 LIQUID ORAL EVERY 6 HOURS
Refills: 0 | Status: DISCONTINUED | OUTPATIENT
Start: 2019-06-12 | End: 2019-06-19

## 2019-06-12 RX ORDER — CITALOPRAM 10 MG/1
20 TABLET, FILM COATED ORAL DAILY
Refills: 0 | Status: DISCONTINUED | OUTPATIENT
Start: 2019-06-12 | End: 2019-06-19

## 2019-06-12 RX ORDER — INSULIN LISPRO 100/ML
VIAL (ML) SUBCUTANEOUS
Refills: 0 | Status: DISCONTINUED | OUTPATIENT
Start: 2019-06-12 | End: 2019-06-19

## 2019-06-12 RX ORDER — PIPERACILLIN AND TAZOBACTAM 4; .5 G/20ML; G/20ML
3.38 INJECTION, POWDER, LYOPHILIZED, FOR SOLUTION INTRAVENOUS ONCE
Refills: 0 | Status: COMPLETED | OUTPATIENT
Start: 2019-06-12 | End: 2019-06-12

## 2019-06-12 RX ORDER — LANOLIN ALCOHOL/MO/W.PET/CERES
3 CREAM (GRAM) TOPICAL AT BEDTIME
Refills: 0 | Status: DISCONTINUED | OUTPATIENT
Start: 2019-06-12 | End: 2019-06-19

## 2019-06-12 RX ADMIN — Medication 500 MILLIGRAM(S): at 10:09

## 2019-06-12 RX ADMIN — BENZOCAINE AND MENTHOL 1 LOZENGE: 5; 1 LIQUID ORAL at 13:07

## 2019-06-12 RX ADMIN — Medication 500 MILLIGRAM(S): at 22:34

## 2019-06-12 RX ADMIN — PIPERACILLIN AND TAZOBACTAM 25 GRAM(S): 4; .5 INJECTION, POWDER, LYOPHILIZED, FOR SOLUTION INTRAVENOUS at 22:35

## 2019-06-12 RX ADMIN — RISPERIDONE 3 MILLIGRAM(S): 4 TABLET ORAL at 18:22

## 2019-06-12 RX ADMIN — CITALOPRAM 20 MILLIGRAM(S): 10 TABLET, FILM COATED ORAL at 18:22

## 2019-06-12 RX ADMIN — HEPARIN SODIUM 5000 UNIT(S): 5000 INJECTION INTRAVENOUS; SUBCUTANEOUS at 15:39

## 2019-06-12 RX ADMIN — PANTOPRAZOLE SODIUM 40 MILLIGRAM(S): 20 TABLET, DELAYED RELEASE ORAL at 15:39

## 2019-06-12 RX ADMIN — Medication 25 MILLIGRAM(S): at 22:30

## 2019-06-12 RX ADMIN — Medication 2: at 22:31

## 2019-06-12 RX ADMIN — Medication 1: at 06:48

## 2019-06-12 RX ADMIN — RISPERIDONE 3 MILLIGRAM(S): 4 TABLET ORAL at 06:50

## 2019-06-12 RX ADMIN — PIPERACILLIN AND TAZOBACTAM 25 GRAM(S): 4; .5 INJECTION, POWDER, LYOPHILIZED, FOR SOLUTION INTRAVENOUS at 15:00

## 2019-06-12 RX ADMIN — PIPERACILLIN AND TAZOBACTAM 25 GRAM(S): 4; .5 INJECTION, POWDER, LYOPHILIZED, FOR SOLUTION INTRAVENOUS at 06:44

## 2019-06-12 RX ADMIN — HEPARIN SODIUM 5000 UNIT(S): 5000 INJECTION INTRAVENOUS; SUBCUTANEOUS at 22:32

## 2019-06-12 RX ADMIN — Medication 0.5 MILLIGRAM(S): at 18:22

## 2019-06-12 RX ADMIN — HEPARIN SODIUM 5000 UNIT(S): 5000 INJECTION INTRAVENOUS; SUBCUTANEOUS at 06:46

## 2019-06-12 RX ADMIN — Medication 3 MILLIGRAM(S): at 22:31

## 2019-06-12 RX ADMIN — Medication 25 MILLIGRAM(S): at 18:22

## 2019-06-12 RX ADMIN — Medication 1: at 12:45

## 2019-06-12 RX ADMIN — Medication 500 MILLIGRAM(S): at 18:23

## 2019-06-12 RX ADMIN — Medication 25 MILLIGRAM(S): at 06:49

## 2019-06-12 RX ADMIN — Medication 0.5 MILLIGRAM(S): at 06:49

## 2019-06-12 NOTE — PROGRESS NOTE ADULT - PROBLEM SELECTOR PLAN 2
Initially hypothermic and pancytopenic, with likely source aspiration pneumonia, s/p 7 day course of zosyn now resolved with normalized WBC count and stable vital signs.

## 2019-06-12 NOTE — PROGRESS NOTE ADULT - ASSESSMENT
69 year old woman with history of HTN, dementia, schizoaffective disorder presents with lethargy and altered mental status s/p intubation for hypercarbic respiratory failure, also found to have hypothermia, bradycardia and pancytopenia of unclear etiology, now extubated with improving mental status and labwork.

## 2019-06-12 NOTE — PROGRESS NOTE ADULT - PROBLEM SELECTOR PLAN 3
Initially low cortisol, initial ACTH stimulation test negative however pt was on steroid  - will repeat test tomorrow AM when pt off all steroids for 48-72 hours.

## 2019-06-12 NOTE — PROGRESS NOTE ADULT - ATTENDING COMMENTS
69 year old woman with history of HTN, dementia, schizoaffective disorder admitted for acute metabolic encephalopathy and hypercarbic respiratory failure s/p intubation thought to be sepsis 2/2 aspiration PNA. Initially treated for AI, now less likely etiology based on ACTH test. Pt now extubated, hemodynamically stable, mental status at baseline  -cont zosyn to complete 7 day course  -swallow eval  -cont all psych meds   -has watery diarrhea might be d/t tube feeds. will transition to oral feeds. if diarrhea improve will d/c rectal tube  -PT eval- daughter interested in [facility] placement

## 2019-06-12 NOTE — PROGRESS NOTE ADULT - PROBLEM SELECTOR PLAN 1
2/2 toxic encephalopathy in the setting of sepsis d/t pna and/or adrenal insufficiency, s/p extubation, now with improving mental status, no longer hypoxic or hypercapneic  - s/p 7 day course of zosyn for pna treatment  - continue to monitor respiratory status and vital signs

## 2019-06-12 NOTE — PROGRESS NOTE ADULT - PROBLEM SELECTOR PLAN 5
Home meds initially held d/t bradycardia/hypotension in the ICU, hydralazine was added for BP control  - Will begin to restart patient's home meds

## 2019-06-12 NOTE — PROVIDER CONTACT NOTE (OTHER) - ACTION/TREATMENT ORDERED:
MD Melton notified and aware. give Hydralazine as ordered. no further interventions ordered. nursing care to continue

## 2019-06-12 NOTE — PROGRESS NOTE ADULT - SUBJECTIVE AND OBJECTIVE BOX
Patient is a 69y old  Female who presents with a chief complaint of Lethargy (11 Jun 2019 20:38)      Jose Velasquez  EM/IM PGY1    SUBJECTIVE / OVERNIGHT EVENTS:    MEDICATIONS  (STANDING):  benztropine 0.5 milliGRAM(s) Oral two times a day  citalopram Solution 20 milliGRAM(s) Enteral Tube daily  dextrose 5%. 1000 milliLiter(s) (50 mL/Hr) IV Continuous <Continuous>  dextrose 50% Injectable 12.5 Gram(s) IV Push once  dextrose 50% Injectable 25 Gram(s) IV Push once  dextrose 50% Injectable 25 Gram(s) IV Push once  heparin  Injectable 5000 Unit(s) SubCutaneous every 8 hours  hydrALAZINE 25 milliGRAM(s) Oral every 8 hours  insulin lispro (HumaLOG) corrective regimen sliding scale   SubCutaneous every 6 hours  pantoprazole  Injectable 40 milliGRAM(s) IV Push daily  piperacillin/tazobactam IVPB. 3.375 Gram(s) IV Intermittent every 8 hours  risperiDONE   Solution 3 milliGRAM(s) Oral two times a day  valproic  acid Syrup 500 milliGRAM(s) Oral three times a day    MEDICATIONS  (PRN):  dextrose 40% Gel 15 Gram(s) Oral once PRN Blood Glucose LESS THAN 70 milliGRAM(s)/deciliter  glucagon  Injectable 1 milliGRAM(s) IntraMuscular once PRN Glucose LESS THAN 70 milligrams/deciliter      Vital Signs Last 24 Hrs  T(C): 36.3 (12 Jun 2019 06:37), Max: 36.9 (11 Jun 2019 20:00)  T(F): 97.4 (12 Jun 2019 06:37), Max: 98.5 (11 Jun 2019 20:00)  HR: 70 (12 Jun 2019 06:37) (39 - 81)  BP: 176/62 (12 Jun 2019 06:37) (105/51 - 198/72)  BP(mean): 71 (11 Jun 2019 22:00) (67 - 107)  RR: 17 (12 Jun 2019 06:37) (10 - 18)  SpO2: 99% (12 Jun 2019 06:37) (96% - 100%)  CAPILLARY BLOOD GLUCOSE      POCT Blood Glucose.: 170 mg/dL (12 Jun 2019 05:50)  POCT Blood Glucose.: 119 mg/dL (11 Jun 2019 23:33)  POCT Blood Glucose.: 109 mg/dL (11 Jun 2019 17:10)  POCT Blood Glucose.: 179 mg/dL (11 Jun 2019 11:39)    I&O's Summary    11 Jun 2019 07:01  -  12 Jun 2019 07:00  --------------------------------------------------------  IN: 780 mL / OUT: 950 mL / NET: -170 mL        PHYSICAL EXAM:  General: NAD  HEENT: NC/AT; PERRL, clear conjunctiva  Neck: supple  Respiratory: grossly CTA b/l  Cardiovascular: +S1/S2; RRR  Abdomen: soft, NT/ND; +BS x4  Extremities: WWP, 2+ peripheral pulses b/l; no LE edema  Skin: normal color and turgor; no rash  Neurological: awake and opening eyes but not following commands consistently    LABS:                        9.7    4.66  )-----------( 75       ( 11 Jun 2019 03:21 )             31.0     06-11    146<H>  |  104  |  29<H>  ----------------------------<  156<H>  4.2   |  30  |  0.88    Ca    8.7      11 Jun 2019 03:21  Phos  4.0     06-11  Mg     2.3     06-11                RADIOLOGY & ADDITIONAL TESTS: Reviewed. Patient is a 69y old  Female who presents with a chief complaint of Lethargy (11 Jun 2019 20:38)      Jose Velasquez  EM/IM PGY1    SUBJECTIVE / OVERNIGHT EVENTS: No acute events overnight. Pt A&Ox2 at this time, at baseline mental status per daughter at bedside. Pt gesturing discomfort with NG tube, otherwise denies other pain or complaints at this time.    MEDICATIONS  (STANDING):  benztropine 0.5 milliGRAM(s) Oral two times a day  citalopram Solution 20 milliGRAM(s) Enteral Tube daily  dextrose 5%. 1000 milliLiter(s) (50 mL/Hr) IV Continuous <Continuous>  dextrose 50% Injectable 12.5 Gram(s) IV Push once  dextrose 50% Injectable 25 Gram(s) IV Push once  dextrose 50% Injectable 25 Gram(s) IV Push once  heparin  Injectable 5000 Unit(s) SubCutaneous every 8 hours  hydrALAZINE 25 milliGRAM(s) Oral every 8 hours  insulin lispro (HumaLOG) corrective regimen sliding scale   SubCutaneous every 6 hours  pantoprazole  Injectable 40 milliGRAM(s) IV Push daily  piperacillin/tazobactam IVPB. 3.375 Gram(s) IV Intermittent every 8 hours  risperiDONE   Solution 3 milliGRAM(s) Oral two times a day  valproic  acid Syrup 500 milliGRAM(s) Oral three times a day    MEDICATIONS  (PRN):  dextrose 40% Gel 15 Gram(s) Oral once PRN Blood Glucose LESS THAN 70 milliGRAM(s)/deciliter  glucagon  Injectable 1 milliGRAM(s) IntraMuscular once PRN Glucose LESS THAN 70 milligrams/deciliter      Vital Signs Last 24 Hrs  T(C): 36.3 (12 Jun 2019 06:37), Max: 36.9 (11 Jun 2019 20:00)  T(F): 97.4 (12 Jun 2019 06:37), Max: 98.5 (11 Jun 2019 20:00)  HR: 70 (12 Jun 2019 06:37) (39 - 81)  BP: 176/62 (12 Jun 2019 06:37) (105/51 - 198/72)  BP(mean): 71 (11 Jun 2019 22:00) (67 - 107)  RR: 17 (12 Jun 2019 06:37) (10 - 18)  SpO2: 99% (12 Jun 2019 06:37) (96% - 100%)  CAPILLARY BLOOD GLUCOSE      POCT Blood Glucose.: 170 mg/dL (12 Jun 2019 05:50)  POCT Blood Glucose.: 119 mg/dL (11 Jun 2019 23:33)  POCT Blood Glucose.: 109 mg/dL (11 Jun 2019 17:10)  POCT Blood Glucose.: 179 mg/dL (11 Jun 2019 11:39)    I&O's Summary    11 Jun 2019 07:01  -  12 Jun 2019 07:00  --------------------------------------------------------  IN: 780 mL / OUT: 950 mL / NET: -170 mL        PHYSICAL EXAM:  General: NAD  HEENT: NC/AT; PERRL, clear conjunctiva  Neck: supple  Respiratory: grossly CTA b/l  Cardiovascular: +S1/S2; RRR  Abdomen: soft, NT/ND; +BS x4  Extremities: WWP, 2+ peripheral pulses b/l; no LE edema  Skin: normal color and turgor; no rash  Neurological: awake and opening eyes but not following commands consistently    LABS:                        9.7    4.66  )-----------( 75       ( 11 Jun 2019 03:21 )             31.0     06-11    146<H>  |  104  |  29<H>  ----------------------------<  156<H>  4.2   |  30  |  0.88    Ca    8.7      11 Jun 2019 03:21  Phos  4.0     06-11  Mg     2.3     06-11                RADIOLOGY & ADDITIONAL TESTS: Reviewed.

## 2019-06-12 NOTE — PROGRESS NOTE ADULT - PROBLEM SELECTOR PLAN 6
Continue home medications including Citalopram 20 mg QD, Risperidone 3 mg BID, Valproic acid 1500 mg QHS, Benztropine 0.5 mg QD

## 2019-06-13 DIAGNOSIS — Z29.9 ENCOUNTER FOR PROPHYLACTIC MEASURES, UNSPECIFIED: ICD-10-CM

## 2019-06-13 DIAGNOSIS — I10 ESSENTIAL (PRIMARY) HYPERTENSION: ICD-10-CM

## 2019-06-13 DIAGNOSIS — J96.02 ACUTE RESPIRATORY FAILURE WITH HYPERCAPNIA: ICD-10-CM

## 2019-06-13 DIAGNOSIS — A41.9 SEPSIS, UNSPECIFIED ORGANISM: ICD-10-CM

## 2019-06-13 DIAGNOSIS — R19.7 DIARRHEA, UNSPECIFIED: ICD-10-CM

## 2019-06-13 DIAGNOSIS — E11.9 TYPE 2 DIABETES MELLITUS WITHOUT COMPLICATIONS: ICD-10-CM

## 2019-06-13 DIAGNOSIS — F25.9 SCHIZOAFFECTIVE DISORDER, UNSPECIFIED: ICD-10-CM

## 2019-06-13 DIAGNOSIS — E27.40 UNSPECIFIED ADRENOCORTICAL INSUFFICIENCY: ICD-10-CM

## 2019-06-13 DIAGNOSIS — D69.6 THROMBOCYTOPENIA, UNSPECIFIED: ICD-10-CM

## 2019-06-13 LAB
ALBUMIN SERPL ELPH-MCNC: 3.3 G/DL — SIGNIFICANT CHANGE UP (ref 3.3–5)
ALP SERPL-CCNC: 42 U/L — SIGNIFICANT CHANGE UP (ref 40–120)
ALT FLD-CCNC: 11 U/L — SIGNIFICANT CHANGE UP (ref 4–33)
ANION GAP SERPL CALC-SCNC: 14 MMO/L — SIGNIFICANT CHANGE UP (ref 7–14)
AST SERPL-CCNC: 12 U/L — SIGNIFICANT CHANGE UP (ref 4–32)
BASOPHILS # BLD AUTO: 0.01 K/UL — SIGNIFICANT CHANGE UP (ref 0–0.2)
BASOPHILS NFR BLD AUTO: 0.2 % — SIGNIFICANT CHANGE UP (ref 0–2)
BILIRUB SERPL-MCNC: 0.3 MG/DL — SIGNIFICANT CHANGE UP (ref 0.2–1.2)
BUN SERPL-MCNC: 22 MG/DL — SIGNIFICANT CHANGE UP (ref 7–23)
CALCIUM SERPL-MCNC: 8.5 MG/DL — SIGNIFICANT CHANGE UP (ref 8.4–10.5)
CHLORIDE SERPL-SCNC: 99 MMOL/L — SIGNIFICANT CHANGE UP (ref 98–107)
CO2 SERPL-SCNC: 29 MMOL/L — SIGNIFICANT CHANGE UP (ref 22–31)
CORTIS SERPL-MCNC: 23.2 UG/DL — HIGH (ref 2.7–18.4)
CREAT SERPL-MCNC: 0.75 MG/DL — SIGNIFICANT CHANGE UP (ref 0.5–1.3)
EOSINOPHIL # BLD AUTO: 0.11 K/UL — SIGNIFICANT CHANGE UP (ref 0–0.5)
EOSINOPHIL NFR BLD AUTO: 2.7 % — SIGNIFICANT CHANGE UP (ref 0–6)
GLUCOSE SERPL-MCNC: 134 MG/DL — HIGH (ref 70–99)
HCT VFR BLD CALC: 31.8 % — LOW (ref 34.5–45)
HGB BLD-MCNC: 9.9 G/DL — LOW (ref 11.5–15.5)
IMM GRANULOCYTES NFR BLD AUTO: 0.7 % — SIGNIFICANT CHANGE UP (ref 0–1.5)
LYMPHOCYTES # BLD AUTO: 1.27 K/UL — SIGNIFICANT CHANGE UP (ref 1–3.3)
LYMPHOCYTES # BLD AUTO: 31.4 % — SIGNIFICANT CHANGE UP (ref 13–44)
MAGNESIUM SERPL-MCNC: 1.8 MG/DL — SIGNIFICANT CHANGE UP (ref 1.6–2.6)
MCHC RBC-ENTMCNC: 28.9 PG — SIGNIFICANT CHANGE UP (ref 27–34)
MCHC RBC-ENTMCNC: 31.1 % — LOW (ref 32–36)
MCV RBC AUTO: 93 FL — SIGNIFICANT CHANGE UP (ref 80–100)
MONOCYTES # BLD AUTO: 0.69 K/UL — SIGNIFICANT CHANGE UP (ref 0–0.9)
MONOCYTES NFR BLD AUTO: 17 % — HIGH (ref 2–14)
NEUTROPHILS # BLD AUTO: 1.94 K/UL — SIGNIFICANT CHANGE UP (ref 1.8–7.4)
NEUTROPHILS NFR BLD AUTO: 48 % — SIGNIFICANT CHANGE UP (ref 43–77)
NRBC # FLD: 0 K/UL — SIGNIFICANT CHANGE UP (ref 0–0)
PHOSPHATE SERPL-MCNC: 3.7 MG/DL — SIGNIFICANT CHANGE UP (ref 2.5–4.5)
PLATELET # BLD AUTO: 97 K/UL — LOW (ref 150–400)
PMV BLD: 13 FL — SIGNIFICANT CHANGE UP (ref 7–13)
POTASSIUM SERPL-MCNC: 3.8 MMOL/L — SIGNIFICANT CHANGE UP (ref 3.5–5.3)
POTASSIUM SERPL-SCNC: 3.8 MMOL/L — SIGNIFICANT CHANGE UP (ref 3.5–5.3)
PROT SERPL-MCNC: 5.2 G/DL — LOW (ref 6–8.3)
RBC # BLD: 3.42 M/UL — LOW (ref 3.8–5.2)
RBC # FLD: 13.9 % — SIGNIFICANT CHANGE UP (ref 10.3–14.5)
RENIN PLAS-CCNC: 0.41 NG/ML/HR — SIGNIFICANT CHANGE UP (ref 0.17–5.38)
SODIUM SERPL-SCNC: 142 MMOL/L — SIGNIFICANT CHANGE UP (ref 135–145)
WBC # BLD: 4.05 K/UL — SIGNIFICANT CHANGE UP (ref 3.8–10.5)
WBC # FLD AUTO: 4.05 K/UL — SIGNIFICANT CHANGE UP (ref 3.8–10.5)

## 2019-06-13 PROCEDURE — 99233 SBSQ HOSP IP/OBS HIGH 50: CPT | Mod: GC

## 2019-06-13 PROCEDURE — 99232 SBSQ HOSP IP/OBS MODERATE 35: CPT

## 2019-06-13 RX ORDER — HYDROCHLOROTHIAZIDE 25 MG
25 TABLET ORAL DAILY
Refills: 0 | Status: DISCONTINUED | OUTPATIENT
Start: 2019-06-13 | End: 2019-06-19

## 2019-06-13 RX ADMIN — Medication 25 MILLIGRAM(S): at 05:35

## 2019-06-13 RX ADMIN — HEPARIN SODIUM 5000 UNIT(S): 5000 INJECTION INTRAVENOUS; SUBCUTANEOUS at 22:01

## 2019-06-13 RX ADMIN — Medication 500 MILLIGRAM(S): at 05:35

## 2019-06-13 RX ADMIN — Medication 1: at 22:06

## 2019-06-13 RX ADMIN — Medication 25 MILLIGRAM(S): at 22:01

## 2019-06-13 RX ADMIN — Medication 2: at 17:52

## 2019-06-13 RX ADMIN — Medication 0.5 MILLIGRAM(S): at 05:35

## 2019-06-13 RX ADMIN — Medication 3 MILLIGRAM(S): at 22:01

## 2019-06-13 RX ADMIN — Medication 0.5 MILLIGRAM(S): at 17:52

## 2019-06-13 RX ADMIN — HEPARIN SODIUM 5000 UNIT(S): 5000 INJECTION INTRAVENOUS; SUBCUTANEOUS at 13:01

## 2019-06-13 RX ADMIN — Medication 25 MILLIGRAM(S): at 13:01

## 2019-06-13 RX ADMIN — Medication 500 MILLIGRAM(S): at 13:01

## 2019-06-13 RX ADMIN — HEPARIN SODIUM 5000 UNIT(S): 5000 INJECTION INTRAVENOUS; SUBCUTANEOUS at 05:34

## 2019-06-13 RX ADMIN — CITALOPRAM 20 MILLIGRAM(S): 10 TABLET, FILM COATED ORAL at 13:04

## 2019-06-13 RX ADMIN — PANTOPRAZOLE SODIUM 40 MILLIGRAM(S): 20 TABLET, DELAYED RELEASE ORAL at 13:01

## 2019-06-13 RX ADMIN — RISPERIDONE 3 MILLIGRAM(S): 4 TABLET ORAL at 17:52

## 2019-06-13 RX ADMIN — RISPERIDONE 3 MILLIGRAM(S): 4 TABLET ORAL at 05:35

## 2019-06-13 RX ADMIN — Medication 25 MILLIGRAM(S): at 13:04

## 2019-06-13 RX ADMIN — Medication 2: at 12:32

## 2019-06-13 RX ADMIN — Medication 500 MILLIGRAM(S): at 22:01

## 2019-06-13 NOTE — PROGRESS NOTE ADULT - PROBLEM SELECTOR PLAN 3
Initially low cortisol, initial ACTH stimulation test negative however pt was on steroid  - will repeat test tomorrow AM when pt off all steroids for 48-72 hours. Initially low cortisol, initial ACTH stimulation test negative however pt was on steroid, was planning to repeat ACTH stimulation test however AM cortisol elevated, unlikely AI. Patient with persistent liquid stool via rectal tube, possibly 2/2 abx induced vs infectious vs tube feeds  - TF d/c'd yesterday, PO diet started  - abx course completed  - will send C diff and GI PCR  - continue to monitor and encourage oral rehydration

## 2019-06-13 NOTE — PROGRESS NOTE ADULT - PROBLEM SELECTOR PLAN 4
Likely 2/2 severe infection  - now uptrending, continue to monitor Initially low cortisol, initial ACTH stimulation test negative however pt was on steroid, was planning to repeat ACTH stimulation test however AM cortisol elevated, AI ruled out.

## 2019-06-13 NOTE — PHYSICAL THERAPY INITIAL EVALUATION ADULT - PATIENT/FAMILY/SIGNIFICANT OTHER GOALS STATEMENT, PT EVAL
Unable to maintain at this time, secondary to pt. confusion and unable to answer questions. Unable to obtain at this time, secondary to pt. confusion and difficulty following instructions.

## 2019-06-13 NOTE — SWALLOW BEDSIDE ASSESSMENT ADULT - PHARYNGEAL PHASE
Delayed pharyngeal swallow/Decreased laryngeal elevation Cough post oral intake/Delayed pharyngeal swallow/Decreased laryngeal elevation

## 2019-06-13 NOTE — SWALLOW BEDSIDE ASSESSMENT ADULT - SLP PERTINENT HISTORY OF CURRENT PROBLEM
Pt is a 68 y/o F who presents with lethargy and AMS, pt intubated for hypercarbic respiratory failure, extubated 6/11. Pt's PMHx is significant for HTN, dementia, schizoaffective disorder.

## 2019-06-13 NOTE — SWALLOW BEDSIDE ASSESSMENT ADULT - COMMENTS
Pt off the floor upon arrival. Will reattempt as schedule permits.
Pt received sitting upright in bed, awake and cooperative, on room air. Pt followed simple commands when given verbal/visual cues. Pt's vocal quality noted to be hoarse/weak s/p extubation.    CXR 6/7 revealed: "Small left pleural effusion. Left basilar opacity may represent atelectasis or pneumonia. No pneumothorax." Pt's WBC is WFL, no fever.

## 2019-06-13 NOTE — PHYSICAL THERAPY INITIAL EVALUATION ADULT - IMPAIRMENTS FOUND, PT EVAL
arousal, attention, and cognition/cognitive impairment/gait, locomotion, and balance/muscle strength cognitive impairment/muscle strength/arousal, attention, and cognition/gait, locomotion, and balance/aerobic capacity/endurance

## 2019-06-13 NOTE — PHYSICAL THERAPY INITIAL EVALUATION ADULT - ADDITIONAL COMMENTS
Unable to assess, pt. is unable to divulge social history. Unable to obtain accurate social history secondary to pt. presenting with confusion during subjective history and presenting with difficulty following commands, limited social history obtained through past medical documents, please refer to social work for more attainable social history. as per documents, pt. lives with her daughter. Pt. returned supine in bed with all tubes/lines intact, call bell in reach and in NAD.

## 2019-06-13 NOTE — SWALLOW BEDSIDE ASSESSMENT ADULT - ASR SWALLOW ASPIRATION MONITOR
cough/fever/pneumonia/throat clearing/upper respiratory infection/change of breathing pattern/position upright (90Y)/gurgly voice/oral hygiene

## 2019-06-13 NOTE — PHYSICAL THERAPY INITIAL EVALUATION ADULT - MANUAL MUSCLE TESTING RESULTS, REHAB EVAL
Unable to assess secondary to pt. confusion. Unable to assess secondary to pt. presenting with confusion and difficulty following instructions

## 2019-06-13 NOTE — PROGRESS NOTE ADULT - PROBLEM SELECTOR PLAN 6
Home meds initially held d/t bradycardia/hypotension in the ICU, hydralazine was added for BP control  - Will begin to restart patient's home meds Home meds initially held d/t bradycardia/hypotension in the ICU, hydralazine was added for BP control  - Will start patient's home hctz 25mg T2DM with A1C 8.9 but not on medications at home  - Will c/w low dose ISS qac/qhs

## 2019-06-13 NOTE — PROGRESS NOTE ADULT - ASSESSMENT
68 yo female with history of T2DM uncontrolled (HbA1c 8.9%) without known complications, schizoaffective d/o and hypertension who presents with a week of lethargy, endocrine consulted for a low cortisol started on steroids, now off, HD stable, clinically improving, now transferred to the floors.       #concern for AI  In the ICU, Patient had ACTH stim test performed on DEX on june 10th.  Baseline cortisol was not obtained but early AM cortisol was 6.3, 30 min after cosyntropin was 24 and 60 min afterwards was 31.  (ACTH <2 but can not interpret ACTH while on dex and during the stim test itself, rather intrepret acth with am cortisol off all steroids)    DEX unlike hydrocort, does not affect ACTH stim assay so it appears as if patient appropriately responded to the stim test.  Can r/o primary AI with above results.     pt also with marked HTN to 180s, decardron dced June 10th  and now pt is on antiHTN for elevated BP.  this also suggests AI is much less likely as pt is now in anti HTN for high blood pressure.  Na and K normal   pts am cortisol OFF steroids for now 48 hrs is 23. this rules out AI.   no need for steroids currently    #diabetes  -low dose ISS premeals and bedtime   if gluocose >250 for two or more FSBG can start Lantus 5 units and titrate up as needed based on trend   -on discharge, can discahrge on metformin (depending on CrCl/LFTS) vs DPP-4 Inhibitor   A1C GOAL 8.0 for this 69 year old  -RDE consult inpt   -glucometer teaching with teachback inpt   -needs outpt followup with PCP and endocrine     If patient wishes to follow up with [facility]     [facility]  [address] [place] [address], [address], [place], [place] [id number] [phone]   Please call to schedule appointment with CDE/Nutritionist and MD appointment next available

## 2019-06-13 NOTE — PHYSICAL THERAPY INITIAL EVALUATION ADULT - PERTINENT HX OF CURRENT PROBLEM, REHAB EVAL
Pt. is a 69 year old female admitted to [facility] for sepsis. PMH: hypertension and diabetes.

## 2019-06-13 NOTE — PROGRESS NOTE ADULT - SUBJECTIVE AND OBJECTIVE BOX
Chief Complaint/Follow-up on: concern for AI    Subjective:  pt is now transferred to the floors, clinically imrpoving   she is HD stable, HTNsive   eating, more awake and alert compared to prior   Na and K stable  afebrile  Am cortisol off steroids 23      MEDICATIONS  (STANDING):  benztropine 0.5 milliGRAM(s) Oral two times a day  citalopram 20 milliGRAM(s) Oral daily  dextrose 5%. 1000 milliLiter(s) (50 mL/Hr) IV Continuous <Continuous>  dextrose 50% Injectable 12.5 Gram(s) IV Push once  dextrose 50% Injectable 25 Gram(s) IV Push once  dextrose 50% Injectable 25 Gram(s) IV Push once  heparin  Injectable 5000 Unit(s) SubCutaneous every 8 hours  hydrALAZINE 25 milliGRAM(s) Oral every 8 hours  hydrochlorothiazide 25 milliGRAM(s) Oral daily  insulin lispro (HumaLOG) corrective regimen sliding scale   SubCutaneous Before meals and at bedtime  melatonin 3 milliGRAM(s) Oral at bedtime  pantoprazole  Injectable 40 milliGRAM(s) IV Push daily  risperiDONE   Solution 3 milliGRAM(s) Oral two times a day  valproic  acid Syrup 500 milliGRAM(s) Oral three times a day    MEDICATIONS  (PRN):  benzocaine 15 mG/menthol 3.6 mG Lozenge 1 Lozenge Oral every 6 hours PRN Sore Throat  dextrose 40% Gel 15 Gram(s) Oral once PRN Blood Glucose LESS THAN 70 milliGRAM(s)/deciliter  glucagon  Injectable 1 milliGRAM(s) IntraMuscular once PRN Glucose LESS THAN 70 milligrams/deciliter      PHYSICAL EXAM:  VITALS: T(C): 36.3 (06-13-19 @ 05:29)  T(F): 97.4 (06-13-19 @ 05:29), Max: 97.4 (06-12-19 @ 22:18)  HR: 58 (06-13-19 @ 05:29) (58 - 71)  BP: 166/63 (06-13-19 @ 05:29) (150/68 - 186/87)  RR:  (16 - 17)  SpO2:  (98% - 100%)  Wt(kg): --  GENERAL: NAD, well-groomed, well-developed  EYES: No proptosis, no injection  HEENT:  Atraumatic, Normocephalic, moist mucous membranes  RESPIRATORY: Clear to auscultation bilaterally; No rales, rhonchi, wheezing, or rubs  CARDIOVASCULAR: Regular rate and rhythm; No murmurs; no peripheral edema  GI: Soft, nontender, non distended, normal bowel sounds    POCT Blood Glucose.: 140 mg/dL (06-13-19 @ 08:40)  POCT Blood Glucose.: 232 mg/dL (06-12-19 @ 22:13)  POCT Blood Glucose.: 114 mg/dL (06-12-19 @ 17:30)  POCT Blood Glucose.: 163 mg/dL (06-12-19 @ 12:30)  POCT Blood Glucose.: 170 mg/dL (06-12-19 @ 05:50)  POCT Blood Glucose.: 119 mg/dL (06-11-19 @ 23:33)  POCT Blood Glucose.: 109 mg/dL (06-11-19 @ 17:10)  POCT Blood Glucose.: 179 mg/dL (06-11-19 @ 11:39)  POCT Blood Glucose.: 113 mg/dL (06-11-19 @ 05:41)  POCT Blood Glucose.: 163 mg/dL (06-11-19 @ 00:09)  POCT Blood Glucose.: 230 mg/dL (06-10-19 @ 17:25)  POCT Blood Glucose.: 241 mg/dL (06-10-19 @ 12:00)    06-13    142  |  99  |  22  ----------------------------<  134<H>  3.8   |  29  |  0.75    EGFR if : 94  EGFR if non : 81    Ca    8.5      06-13  Mg     1.8     06-13  Phos  3.7     06-13    TPro  5.2<L>  /  Alb  3.3  /  TBili  0.3  /  DBili  x   /  AST  12  /  ALT  11  /  AlkPhos  42  06-13          Thyroid Function Tests:  06-06 @ 17:06 TSH 3.70 FreeT4 -- T3 -- Anti TPO -- Anti Thyroglobulin Ab -- TSI --      Hemoglobin A1C, Whole Blood: 8.9 % <H> [4.0 - 5.6] (06-08-19 @ 05:20)

## 2019-06-13 NOTE — PROGRESS NOTE ADULT - PROBLEM SELECTOR PLAN 5
T2DM with A1C 8.9 but not on medications at home  - Will c/w premeal mod ISS T2DM with A1C 8.9 but not on medications at home  - Will c/w low dose ISS qac/qhs range of motion is not limited and there is no muscle tenderness. Likely 2/2 severe infection  - now uptrending, continue to monitor

## 2019-06-13 NOTE — SWALLOW BEDSIDE ASSESSMENT ADULT - SWALLOW EVAL: DIAGNOSIS
Pt presents with 1. Moderate oral dysphagia when given regular solids marked by adequate bolus containment, prolonged mastication/manipulation with delayed anterior to posterior transfer, and adequate oral clearance post swallow. 2. Mild oral dysphagia when given puree, honey thick liquids, and nectar thick liquids marked by adequate bolus retrieval/containment, prolonged bolus manipulation and transfer, and adequate oral clearance post swallow. 3. Moderate pharyngeal dysphagia when given nectar thick liquids marked by suspected delayed swallow onset, mildly reduced laryngeal elevation to palpation, and immediate cough response post swallow. 4. Mild pharyngeal dysphagia when given puree and honey thick liquids marked by suspected delayed swallow onset, mildly reduced laryngeal elevation to palpation, and no overt s/sx of aspiration. 5. Recommend pt resume puree with honey thick liquids. Please monitor PO tolerance and acute change in mental status.

## 2019-06-13 NOTE — PHYSICAL THERAPY INITIAL EVALUATION ADULT - LEVEL OF INDEPENDENCE: SIT/STAND, REHAB EVAL
unable to perform/Pt. unable to perform further functional mobility secondary to weakness and postural instability

## 2019-06-13 NOTE — PROGRESS NOTE ADULT - ATTENDING COMMENTS
Meghana Landers MD  Pager [phone] ([facility])/ [phone] ([facility]) [please provide 10 digit call back number]  Nights and weekends: [phone]  Please note that this patient may be followed by a different provider tomorrow. If no answer or after hours, please contact [phone].  For final dc reccomendations, please call [phone] or page the endocrine fellow on call. Meghana Landers MD  Pager [phone] ([facility])/ [phone] ([facility]) [please provide 10 digit call back number]  Nights and weekends: [phone]  Please note that this patient may be followed by a different provider tomorrow. If no answer or after hours, please contact [phone].  For final dc reccomendations, please call [phone] or page the endocrine fellow on call.    will sign off   call with ? or concerns

## 2019-06-13 NOTE — PROGRESS NOTE ADULT - PROBLEM SELECTOR PLAN 8
DVT ppx: hep subq  Diet: pureed, pending S&S H/o schizoaffective disorder  - C/w pt's home medications including Citalopram 20 mg QD, Risperidone 3 mg BID, Valproic acid 1500 mg QHS, Benztropine 0.5 mg QD

## 2019-06-13 NOTE — PROGRESS NOTE ADULT - PROBLEM SELECTOR PLAN 7
Continue home medications including Citalopram 20 mg QD, Risperidone 3 mg BID, Valproic acid 1500 mg QHS, Benztropine 0.5 mg QD H/o schizoaffective disorder  - C/w pt's home medications including Citalopram 20 mg QD, Risperidone 3 mg BID, Valproic acid 1500 mg QHS, Benztropine 0.5 mg QD Home meds initially held d/t bradycardia/hypotension in the ICU, hydralazine was added for BP control  - Will start patient's home hctz 25mg

## 2019-06-13 NOTE — SWALLOW BEDSIDE ASSESSMENT ADULT - SWALLOW EVAL: RECOMMENDED FEEDING/EATING TECHNIQUES
alternate food with liquid/allow for swallow between intakes/small sips/bites/maintain upright posture during/after eating for 30 mins/oral hygiene/check mouth frequently for oral residue/pocketing/crush medication (when feasible)/position upright (90 degrees)

## 2019-06-14 LAB
ACTH SER-ACNC: 19.5 PG/ML — SIGNIFICANT CHANGE UP (ref 7.2–63.3)
ANION GAP SERPL CALC-SCNC: 12 MMO/L — SIGNIFICANT CHANGE UP (ref 7–14)
BASOPHILS # BLD AUTO: 0.02 K/UL — SIGNIFICANT CHANGE UP (ref 0–0.2)
BASOPHILS NFR BLD AUTO: 0.4 % — SIGNIFICANT CHANGE UP (ref 0–2)
BUN SERPL-MCNC: 20 MG/DL — SIGNIFICANT CHANGE UP (ref 7–23)
CALCIUM SERPL-MCNC: 8.8 MG/DL — SIGNIFICANT CHANGE UP (ref 8.4–10.5)
CHLORIDE SERPL-SCNC: 97 MMOL/L — LOW (ref 98–107)
CO2 SERPL-SCNC: 31 MMOL/L — SIGNIFICANT CHANGE UP (ref 22–31)
CREAT SERPL-MCNC: 0.62 MG/DL — SIGNIFICANT CHANGE UP (ref 0.5–1.3)
EOSINOPHIL # BLD AUTO: 0.15 K/UL — SIGNIFICANT CHANGE UP (ref 0–0.5)
EOSINOPHIL NFR BLD AUTO: 2.7 % — SIGNIFICANT CHANGE UP (ref 0–6)
GLUCOSE SERPL-MCNC: 140 MG/DL — HIGH (ref 70–99)
HCT VFR BLD CALC: 35.3 % — SIGNIFICANT CHANGE UP (ref 34.5–45)
HGB BLD-MCNC: 11 G/DL — LOW (ref 11.5–15.5)
IMM GRANULOCYTES NFR BLD AUTO: 0.9 % — SIGNIFICANT CHANGE UP (ref 0–1.5)
LYMPHOCYTES # BLD AUTO: 1.93 K/UL — SIGNIFICANT CHANGE UP (ref 1–3.3)
LYMPHOCYTES # BLD AUTO: 34.3 % — SIGNIFICANT CHANGE UP (ref 13–44)
MAGNESIUM SERPL-MCNC: 1.8 MG/DL — SIGNIFICANT CHANGE UP (ref 1.6–2.6)
MCHC RBC-ENTMCNC: 29.1 PG — SIGNIFICANT CHANGE UP (ref 27–34)
MCHC RBC-ENTMCNC: 31.2 % — LOW (ref 32–36)
MCV RBC AUTO: 93.4 FL — SIGNIFICANT CHANGE UP (ref 80–100)
MONOCYTES # BLD AUTO: 0.9 K/UL — SIGNIFICANT CHANGE UP (ref 0–0.9)
MONOCYTES NFR BLD AUTO: 16 % — HIGH (ref 2–14)
NEUTROPHILS # BLD AUTO: 2.58 K/UL — SIGNIFICANT CHANGE UP (ref 1.8–7.4)
NEUTROPHILS NFR BLD AUTO: 45.7 % — SIGNIFICANT CHANGE UP (ref 43–77)
NRBC # FLD: 0.02 K/UL — SIGNIFICANT CHANGE UP (ref 0–0)
PHOSPHATE SERPL-MCNC: 2.7 MG/DL — SIGNIFICANT CHANGE UP (ref 2.5–4.5)
PLATELET # BLD AUTO: 94 K/UL — LOW (ref 150–400)
PMV BLD: 13.7 FL — HIGH (ref 7–13)
POTASSIUM SERPL-MCNC: 3.9 MMOL/L — SIGNIFICANT CHANGE UP (ref 3.5–5.3)
POTASSIUM SERPL-SCNC: 3.9 MMOL/L — SIGNIFICANT CHANGE UP (ref 3.5–5.3)
RBC # BLD: 3.78 M/UL — LOW (ref 3.8–5.2)
RBC # FLD: 13.7 % — SIGNIFICANT CHANGE UP (ref 10.3–14.5)
SODIUM SERPL-SCNC: 140 MMOL/L — SIGNIFICANT CHANGE UP (ref 135–145)
WBC # BLD: 5.63 K/UL — SIGNIFICANT CHANGE UP (ref 3.8–10.5)
WBC # FLD AUTO: 5.63 K/UL — SIGNIFICANT CHANGE UP (ref 3.8–10.5)

## 2019-06-14 PROCEDURE — 99233 SBSQ HOSP IP/OBS HIGH 50: CPT | Mod: GC

## 2019-06-14 RX ADMIN — Medication 1: at 22:25

## 2019-06-14 RX ADMIN — Medication 0.5 MILLIGRAM(S): at 18:10

## 2019-06-14 RX ADMIN — RISPERIDONE 3 MILLIGRAM(S): 4 TABLET ORAL at 18:10

## 2019-06-14 RX ADMIN — Medication 0.5 MILLIGRAM(S): at 05:56

## 2019-06-14 RX ADMIN — Medication 500 MILLIGRAM(S): at 13:24

## 2019-06-14 RX ADMIN — Medication 25 MILLIGRAM(S): at 21:31

## 2019-06-14 RX ADMIN — Medication 2: at 13:24

## 2019-06-14 RX ADMIN — CITALOPRAM 20 MILLIGRAM(S): 10 TABLET, FILM COATED ORAL at 13:24

## 2019-06-14 RX ADMIN — PANTOPRAZOLE SODIUM 40 MILLIGRAM(S): 20 TABLET, DELAYED RELEASE ORAL at 13:25

## 2019-06-14 RX ADMIN — Medication 25 MILLIGRAM(S): at 05:57

## 2019-06-14 RX ADMIN — Medication 3 MILLIGRAM(S): at 21:32

## 2019-06-14 RX ADMIN — RISPERIDONE 3 MILLIGRAM(S): 4 TABLET ORAL at 05:56

## 2019-06-14 RX ADMIN — Medication 1: at 17:40

## 2019-06-14 RX ADMIN — HEPARIN SODIUM 5000 UNIT(S): 5000 INJECTION INTRAVENOUS; SUBCUTANEOUS at 21:33

## 2019-06-14 RX ADMIN — Medication 500 MILLIGRAM(S): at 05:56

## 2019-06-14 RX ADMIN — Medication 25 MILLIGRAM(S): at 05:56

## 2019-06-14 RX ADMIN — HEPARIN SODIUM 5000 UNIT(S): 5000 INJECTION INTRAVENOUS; SUBCUTANEOUS at 05:56

## 2019-06-14 RX ADMIN — HEPARIN SODIUM 5000 UNIT(S): 5000 INJECTION INTRAVENOUS; SUBCUTANEOUS at 13:24

## 2019-06-14 RX ADMIN — Medication 500 MILLIGRAM(S): at 21:30

## 2019-06-14 RX ADMIN — Medication 25 MILLIGRAM(S): at 13:24

## 2019-06-14 NOTE — PROGRESS NOTE ADULT - PROBLEM SELECTOR PLAN 4
Initially low cortisol, initial ACTH stimulation test negative however pt was on steroid, was planning to repeat ACTH stimulation test however AM cortisol elevated, AI ruled out.

## 2019-06-14 NOTE — PROVIDER CONTACT NOTE (OTHER) - RECOMMENDATIONS
MD made aware. 1:1 observation continued until patient moved to EC room. Family at bedside. Patient was reoriented but remained confused.

## 2019-06-14 NOTE — PROGRESS NOTE ADULT - SUBJECTIVE AND OBJECTIVE BOX
Patient is a 69y old  Female who presents with a chief complaint of Lethargy (13 Jun 2019 10:41)      Jose Velasquez  EM/IM PGY1    SUBJECTIVE / OVERNIGHT EVENTS:    MEDICATIONS  (STANDING):  benztropine 0.5 milliGRAM(s) Oral two times a day  citalopram 20 milliGRAM(s) Oral daily  dextrose 5%. 1000 milliLiter(s) (50 mL/Hr) IV Continuous <Continuous>  dextrose 50% Injectable 12.5 Gram(s) IV Push once  dextrose 50% Injectable 25 Gram(s) IV Push once  dextrose 50% Injectable 25 Gram(s) IV Push once  heparin  Injectable 5000 Unit(s) SubCutaneous every 8 hours  hydrALAZINE 25 milliGRAM(s) Oral every 8 hours  hydrochlorothiazide 25 milliGRAM(s) Oral daily  insulin lispro (HumaLOG) corrective regimen sliding scale   SubCutaneous Before meals and at bedtime  melatonin 3 milliGRAM(s) Oral at bedtime  pantoprazole  Injectable 40 milliGRAM(s) IV Push daily  risperiDONE   Solution 3 milliGRAM(s) Oral two times a day  valproic  acid Syrup 500 milliGRAM(s) Oral three times a day    MEDICATIONS  (PRN):  benzocaine 15 mG/menthol 3.6 mG Lozenge 1 Lozenge Oral every 6 hours PRN Sore Throat  dextrose 40% Gel 15 Gram(s) Oral once PRN Blood Glucose LESS THAN 70 milliGRAM(s)/deciliter  glucagon  Injectable 1 milliGRAM(s) IntraMuscular once PRN Glucose LESS THAN 70 milligrams/deciliter      Vital Signs Last 24 Hrs  T(C): 36.4 (14 Jun 2019 05:50), Max: 36.4 (13 Jun 2019 12:47)  T(F): 97.5 (14 Jun 2019 05:50), Max: 97.6 (13 Jun 2019 21:53)  HR: 57 (14 Jun 2019 05:50) (57 - 85)  BP: 150/60 (14 Jun 2019 05:50) (150/60 - 170/73)  BP(mean): --  RR: 18 (14 Jun 2019 05:50) (17 - 18)  SpO2: 100% (14 Jun 2019 05:50) (98% - 100%)  CAPILLARY BLOOD GLUCOSE      POCT Blood Glucose.: 192 mg/dL (13 Jun 2019 22:02)  POCT Blood Glucose.: 202 mg/dL (13 Jun 2019 17:26)  POCT Blood Glucose.: 246 mg/dL (13 Jun 2019 12:18)  POCT Blood Glucose.: 140 mg/dL (13 Jun 2019 08:40)    I&O's Summary      PHYSICAL EXAM:  General: NAD  HEENT: NC/AT; PERRL, clear conjunctiva  Neck: supple  Respiratory: grossly CTA b/l  Cardiovascular: +S1/S2; RRR  Abdomen: soft, NT/ND; +BS x4  Extremities: WWP, 2+ peripheral pulses b/l; no LE edema  Skin: normal color and turgor; no rash  Neurological: A&Ox2, no focal motor or sensory deficits    LABS:                        9.9    4.05  )-----------( 97       ( 13 Jun 2019 05:10 )             31.8     06-13    142  |  99  |  22  ----------------------------<  134<H>  3.8   |  29  |  0.75    Ca    8.5      13 Jun 2019 05:10  Phos  3.7     06-13  Mg     1.8     06-13    TPro  5.2<L>  /  Alb  3.3  /  TBili  0.3  /  DBili  x   /  AST  12  /  ALT  11  /  AlkPhos  42  06-13              RADIOLOGY & ADDITIONAL TESTS: Reviewed. Patient is a 69y old  Female who presents with a chief complaint of Lethargy (13 Jun 2019 10:41)      Jose Velasquez  EM/IM PGY1    SUBJECTIVE / OVERNIGHT EVENTS: No acute events overnight. Pt reports feeling hungry this AM otherwise denies any other pain or complaints at this time.    MEDICATIONS  (STANDING):  benztropine 0.5 milliGRAM(s) Oral two times a day  citalopram 20 milliGRAM(s) Oral daily  dextrose 5%. 1000 milliLiter(s) (50 mL/Hr) IV Continuous <Continuous>  dextrose 50% Injectable 12.5 Gram(s) IV Push once  dextrose 50% Injectable 25 Gram(s) IV Push once  dextrose 50% Injectable 25 Gram(s) IV Push once  heparin  Injectable 5000 Unit(s) SubCutaneous every 8 hours  hydrALAZINE 25 milliGRAM(s) Oral every 8 hours  hydrochlorothiazide 25 milliGRAM(s) Oral daily  insulin lispro (HumaLOG) corrective regimen sliding scale   SubCutaneous Before meals and at bedtime  melatonin 3 milliGRAM(s) Oral at bedtime  pantoprazole  Injectable 40 milliGRAM(s) IV Push daily  risperiDONE   Solution 3 milliGRAM(s) Oral two times a day  valproic  acid Syrup 500 milliGRAM(s) Oral three times a day    MEDICATIONS  (PRN):  benzocaine 15 mG/menthol 3.6 mG Lozenge 1 Lozenge Oral every 6 hours PRN Sore Throat  dextrose 40% Gel 15 Gram(s) Oral once PRN Blood Glucose LESS THAN 70 milliGRAM(s)/deciliter  glucagon  Injectable 1 milliGRAM(s) IntraMuscular once PRN Glucose LESS THAN 70 milligrams/deciliter    Vital Signs Last 24 Hrs  T(C): 36.4 (14 Jun 2019 05:50), Max: 36.4 (13 Jun 2019 12:47)  T(F): 97.5 (14 Jun 2019 05:50), Max: 97.6 (13 Jun 2019 21:53)  HR: 57 (14 Jun 2019 05:50) (57 - 85)  BP: 150/60 (14 Jun 2019 05:50) (150/60 - 170/73)  BP(mean): --  RR: 18 (14 Jun 2019 05:50) (17 - 18)  SpO2: 100% (14 Jun 2019 05:50) (98% - 100%)  CAPILLARY BLOOD GLUCOSE      POCT Blood Glucose.: 192 mg/dL (13 Jun 2019 22:02)  POCT Blood Glucose.: 202 mg/dL (13 Jun 2019 17:26)  POCT Blood Glucose.: 246 mg/dL (13 Jun 2019 12:18)  POCT Blood Glucose.: 140 mg/dL (13 Jun 2019 08:40)    I&O's Summary      PHYSICAL EXAM:  General: NAD  HEENT: NC/AT; PERRL, clear conjunctiva  Neck: supple  Respiratory: grossly CTA b/l  Cardiovascular: +S1/S2; RRR  Abdomen: soft, NT/ND; +BS x4  Extremities: WWP, 2+ peripheral pulses b/l; no LE edema  Skin: normal color and turgor; no rash  Neurological: A&Ox2, no focal motor or sensory deficits    LABS:                        9.9    4.05  )-----------( 97       ( 13 Jun 2019 05:10 )             31.8     06-13    142  |  99  |  22  ----------------------------<  134<H>  3.8   |  29  |  0.75    Ca    8.5      13 Jun 2019 05:10  Phos  3.7     06-13  Mg     1.8     06-13    TPro  5.2<L>  /  Alb  3.3  /  TBili  0.3  /  DBili  x   /  AST  12  /  ALT  11  /  AlkPhos  42  06-13        RADIOLOGY & ADDITIONAL TESTS: Reviewed. Patient is a 69y old  Female who presents with a chief complaint of Lethargy (13 Jun 2019 10:41)      Jose Velasquez  EM/IM PGY1    SUBJECTIVE / OVERNIGHT EVENTS: No acute events overnight. Pt reports feeling hungry this AM. She otherwise denies any fevers, chest pain, SOB, cough, abd pain, n/v/d or complaints at this time.    MEDICATIONS  (STANDING):  benztropine 0.5 milliGRAM(s) Oral two times a day  citalopram 20 milliGRAM(s) Oral daily  dextrose 5%. 1000 milliLiter(s) (50 mL/Hr) IV Continuous <Continuous>  dextrose 50% Injectable 12.5 Gram(s) IV Push once  dextrose 50% Injectable 25 Gram(s) IV Push once  dextrose 50% Injectable 25 Gram(s) IV Push once  heparin  Injectable 5000 Unit(s) SubCutaneous every 8 hours  hydrALAZINE 25 milliGRAM(s) Oral every 8 hours  hydrochlorothiazide 25 milliGRAM(s) Oral daily  insulin lispro (HumaLOG) corrective regimen sliding scale   SubCutaneous Before meals and at bedtime  melatonin 3 milliGRAM(s) Oral at bedtime  pantoprazole  Injectable 40 milliGRAM(s) IV Push daily  risperiDONE   Solution 3 milliGRAM(s) Oral two times a day  valproic  acid Syrup 500 milliGRAM(s) Oral three times a day    MEDICATIONS  (PRN):  benzocaine 15 mG/menthol 3.6 mG Lozenge 1 Lozenge Oral every 6 hours PRN Sore Throat  dextrose 40% Gel 15 Gram(s) Oral once PRN Blood Glucose LESS THAN 70 milliGRAM(s)/deciliter  glucagon  Injectable 1 milliGRAM(s) IntraMuscular once PRN Glucose LESS THAN 70 milligrams/deciliter    Vital Signs Last 24 Hrs  T(C): 36.4 (14 Jun 2019 05:50), Max: 36.4 (13 Jun 2019 12:47)  T(F): 97.5 (14 Jun 2019 05:50), Max: 97.6 (13 Jun 2019 21:53)  HR: 57 (14 Jun 2019 05:50) (57 - 85)  BP: 150/60 (14 Jun 2019 05:50) (150/60 - 170/73)  BP(mean): --  RR: 18 (14 Jun 2019 05:50) (17 - 18)  SpO2: 100% (14 Jun 2019 05:50) (98% - 100%)  CAPILLARY BLOOD GLUCOSE      POCT Blood Glucose.: 192 mg/dL (13 Jun 2019 22:02)  POCT Blood Glucose.: 202 mg/dL (13 Jun 2019 17:26)  POCT Blood Glucose.: 246 mg/dL (13 Jun 2019 12:18)  POCT Blood Glucose.: 140 mg/dL (13 Jun 2019 08:40)    I&O's Summary      PHYSICAL EXAM:  General: NAD  HEENT: NC/AT; PERRL, clear conjunctiva  Neck: supple  Respiratory: grossly CTA b/l  Cardiovascular: +S1/S2; RRR  Abdomen: soft, NT/ND; +BS x4  Extremities: WWP, 2+ peripheral pulses b/l; no LE edema  Skin: normal color and turgor; no rash  Neurological: A&Ox2, no focal motor or sensory deficits    LABS:                        9.9    4.05  )-----------( 97       ( 13 Jun 2019 05:10 )             31.8     06-13    142  |  99  |  22  ----------------------------<  134<H>  3.8   |  29  |  0.75    Ca    8.5      13 Jun 2019 05:10  Phos  3.7     06-13  Mg     1.8     06-13    TPro  5.2<L>  /  Alb  3.3  /  TBili  0.3  /  DBili  x   /  AST  12  /  ALT  11  /  AlkPhos  42  06-13        RADIOLOGY & ADDITIONAL TESTS: Reviewed.
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
## 2019-06-14 NOTE — PROGRESS NOTE ADULT - PROBLEM SELECTOR PLAN 8
H/o schizoaffective disorder  - C/w pt's home medications including Citalopram 20 mg QD, Risperidone 3 mg BID, Valproic acid 1500 mg QHS, Benztropine 0.5 mg QD
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
## 2019-06-14 NOTE — PROGRESS NOTE ADULT - PROBLEM SELECTOR PLAN 7
Home meds initially held d/t bradycardia/hypotension in the ICU, hydralazine was added for BP control  - Will start patient's home hctz 25mg

## 2019-06-14 NOTE — PROGRESS NOTE ADULT - PROBLEM SELECTOR PLAN 3
Patient with persistent liquid stool via rectal tube, possibly 2/2 abx induced vs infectious vs tube feeds  - TF d/c'd yesterday, PO diet started  - abx course completed  - will send C diff and GI PCR  - continue to monitor and encourage oral rehydration Patient with persistent liquid stool via rectal tube, possibly 2/2 abx induced vs infectious vs tube feeds. resolved  - TF d/c'd yesterday, PO diet started  - abx course completed  - continue to monitor and encourage oral rehydration Patient with persistent liquid stool via rectal tube, possibly 2/2 abx induced vs infectious vs tube feeds.  - TF d/c'd yesterday, PO diet started  - Diarrhea now resolved  - abx course completed  - continue to monitor and encourage oral rehydration
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
## 2019-06-15 PROCEDURE — 99233 SBSQ HOSP IP/OBS HIGH 50: CPT | Mod: GC

## 2019-06-15 RX ADMIN — Medication 1: at 22:51

## 2019-06-15 RX ADMIN — Medication 0.5 MILLIGRAM(S): at 05:21

## 2019-06-15 RX ADMIN — Medication 2: at 17:42

## 2019-06-15 RX ADMIN — RISPERIDONE 3 MILLIGRAM(S): 4 TABLET ORAL at 05:21

## 2019-06-15 RX ADMIN — PANTOPRAZOLE SODIUM 40 MILLIGRAM(S): 20 TABLET, DELAYED RELEASE ORAL at 12:14

## 2019-06-15 RX ADMIN — Medication 500 MILLIGRAM(S): at 22:51

## 2019-06-15 RX ADMIN — RISPERIDONE 3 MILLIGRAM(S): 4 TABLET ORAL at 19:06

## 2019-06-15 RX ADMIN — Medication 0.5 MILLIGRAM(S): at 18:44

## 2019-06-15 RX ADMIN — HEPARIN SODIUM 5000 UNIT(S): 5000 INJECTION INTRAVENOUS; SUBCUTANEOUS at 22:51

## 2019-06-15 RX ADMIN — Medication 25 MILLIGRAM(S): at 05:21

## 2019-06-15 RX ADMIN — Medication 500 MILLIGRAM(S): at 13:09

## 2019-06-15 RX ADMIN — HEPARIN SODIUM 5000 UNIT(S): 5000 INJECTION INTRAVENOUS; SUBCUTANEOUS at 05:22

## 2019-06-15 RX ADMIN — Medication 3 MILLIGRAM(S): at 22:51

## 2019-06-15 RX ADMIN — CITALOPRAM 20 MILLIGRAM(S): 10 TABLET, FILM COATED ORAL at 12:14

## 2019-06-15 RX ADMIN — Medication 25 MILLIGRAM(S): at 13:09

## 2019-06-15 RX ADMIN — Medication 1: at 09:05

## 2019-06-15 RX ADMIN — Medication 2: at 12:13

## 2019-06-15 RX ADMIN — Medication 500 MILLIGRAM(S): at 05:21

## 2019-06-15 RX ADMIN — HEPARIN SODIUM 5000 UNIT(S): 5000 INJECTION INTRAVENOUS; SUBCUTANEOUS at 13:09

## 2019-06-15 RX ADMIN — Medication 25 MILLIGRAM(S): at 22:51

## 2019-06-15 NOTE — PROGRESS NOTE ADULT - PROBLEM SELECTOR PLAN 7
Home meds initially held d/t bradycardia/hypotension in the ICU, hydralazine was added for BP control  - Will start patient's home hctz 25mg Home meds initially held d/t bradycardia/hypotension in the ICU, hydralazine was added for BP control  - Will c/w patient's home hctz 25mg

## 2019-06-15 NOTE — PROGRESS NOTE ADULT - SUBJECTIVE AND OBJECTIVE BOX
Patient is a 69y old  Female who presents with a chief complaint of Lethargy (14 Jun 2019 07:42)      Jose Velasquez  EM/IM PGY1    SUBJECTIVE / OVERNIGHT EVENTS:    MEDICATIONS  (STANDING):  benztropine 0.5 milliGRAM(s) Oral two times a day  citalopram 20 milliGRAM(s) Oral daily  dextrose 5%. 1000 milliLiter(s) (50 mL/Hr) IV Continuous <Continuous>  dextrose 50% Injectable 12.5 Gram(s) IV Push once  dextrose 50% Injectable 25 Gram(s) IV Push once  dextrose 50% Injectable 25 Gram(s) IV Push once  heparin  Injectable 5000 Unit(s) SubCutaneous every 8 hours  hydrALAZINE 25 milliGRAM(s) Oral every 8 hours  hydrochlorothiazide 25 milliGRAM(s) Oral daily  insulin lispro (HumaLOG) corrective regimen sliding scale   SubCutaneous Before meals and at bedtime  melatonin 3 milliGRAM(s) Oral at bedtime  pantoprazole  Injectable 40 milliGRAM(s) IV Push daily  risperiDONE   Solution 3 milliGRAM(s) Oral two times a day  valproic  acid Syrup 500 milliGRAM(s) Oral three times a day    MEDICATIONS  (PRN):  benzocaine 15 mG/menthol 3.6 mG Lozenge 1 Lozenge Oral every 6 hours PRN Sore Throat  dextrose 40% Gel 15 Gram(s) Oral once PRN Blood Glucose LESS THAN 70 milliGRAM(s)/deciliter  glucagon  Injectable 1 milliGRAM(s) IntraMuscular once PRN Glucose LESS THAN 70 milligrams/deciliter      Vital Signs Last 24 Hrs  T(C): 36.7 (15 Edwin 2019 04:48), Max: 36.7 (15 Edwin 2019 04:48)  T(F): 98 (15 Edwin 2019 04:48), Max: 98 (15 Edwin 2019 04:48)  HR: 70 (15 Edwin 2019 04:48) (67 - 73)  BP: 153/75 (15 Edwin 2019 04:48) (153/75 - 178/69)  BP(mean): --  RR: 18 (15 Edwin 2019 04:48) (17 - 18)  SpO2: 100% (15 Edwin 2019 04:48) (99% - 100%)  CAPILLARY BLOOD GLUCOSE      POCT Blood Glucose.: 197 mg/dL (14 Jun 2019 22:10)  POCT Blood Glucose.: 162 mg/dL (14 Jun 2019 17:31)  POCT Blood Glucose.: 249 mg/dL (14 Jun 2019 12:33)  POCT Blood Glucose.: 136 mg/dL (14 Jun 2019 08:33)    I&O's Summary      PHYSICAL EXAM:  General: NAD  HEENT: NC/AT; PERRL, clear conjunctiva  Neck: supple  Respiratory: grossly CTA b/l  Cardiovascular: +S1/S2; RRR  Abdomen: soft, NT/ND; +BS x4  Extremities: WWP, 2+ peripheral pulses b/l; no LE edema  Skin: normal color and turgor; no rash  Neurological: A&Ox2, no focal motor or sensory deficits    LABS:                        11.0   5.63  )-----------( 94       ( 14 Jun 2019 06:30 )             35.3     06-14    140  |  97<L>  |  20  ----------------------------<  140<H>  3.9   |  31  |  0.62    Ca    8.8      14 Jun 2019 06:30  Phos  2.7     06-14  Mg     1.8     06-14      RADIOLOGY & ADDITIONAL TESTS: Reviewed. Patient is a 69y old  Female who presents with a chief complaint of Lethargy (14 Jun 2019 07:42)      Jose Velasquez  EM/IM PGY1    SUBJECTIVE / OVERNIGHT EVENTS: No acute events overnight. Pt A&Ox1 this morning, intermittently responding appropriately to questions and other times alert but without response. She denies any pain or other complaints at this time.    MEDICATIONS  (STANDING):  benztropine 0.5 milliGRAM(s) Oral two times a day  citalopram 20 milliGRAM(s) Oral daily  dextrose 5%. 1000 milliLiter(s) (50 mL/Hr) IV Continuous <Continuous>  dextrose 50% Injectable 12.5 Gram(s) IV Push once  dextrose 50% Injectable 25 Gram(s) IV Push once  dextrose 50% Injectable 25 Gram(s) IV Push once  heparin  Injectable 5000 Unit(s) SubCutaneous every 8 hours  hydrALAZINE 25 milliGRAM(s) Oral every 8 hours  hydrochlorothiazide 25 milliGRAM(s) Oral daily  insulin lispro (HumaLOG) corrective regimen sliding scale   SubCutaneous Before meals and at bedtime  melatonin 3 milliGRAM(s) Oral at bedtime  pantoprazole  Injectable 40 milliGRAM(s) IV Push daily  risperiDONE   Solution 3 milliGRAM(s) Oral two times a day  valproic  acid Syrup 500 milliGRAM(s) Oral three times a day    MEDICATIONS  (PRN):  benzocaine 15 mG/menthol 3.6 mG Lozenge 1 Lozenge Oral every 6 hours PRN Sore Throat  dextrose 40% Gel 15 Gram(s) Oral once PRN Blood Glucose LESS THAN 70 milliGRAM(s)/deciliter  glucagon  Injectable 1 milliGRAM(s) IntraMuscular once PRN Glucose LESS THAN 70 milligrams/deciliter      Vital Signs Last 24 Hrs  T(C): 36.7 (15 Edwin 2019 04:48), Max: 36.7 (15 Edwin 2019 04:48)  T(F): 98 (15 Edwin 2019 04:48), Max: 98 (15 Edwin 2019 04:48)  HR: 70 (15 Edwin 2019 04:48) (67 - 73)  BP: 153/75 (15 Edwin 2019 04:48) (153/75 - 178/69)  BP(mean): --  RR: 18 (15 Edwin 2019 04:48) (17 - 18)  SpO2: 100% (15 Edwin 2019 04:48) (99% - 100%)  CAPILLARY BLOOD GLUCOSE      POCT Blood Glucose.: 197 mg/dL (14 Jun 2019 22:10)  POCT Blood Glucose.: 162 mg/dL (14 Jun 2019 17:31)  POCT Blood Glucose.: 249 mg/dL (14 Jun 2019 12:33)  POCT Blood Glucose.: 136 mg/dL (14 Jun 2019 08:33)    I&O's Summary      PHYSICAL EXAM:  General: NAD  HEENT: NC/AT; PERRL, clear conjunctiva  Neck: supple  Respiratory: grossly CTA b/l  Cardiovascular: +S1/S2; RRR  Abdomen: soft, NT/ND; +BS x4  Extremities: WWP, 2+ peripheral pulses b/l; no LE edema  Skin: normal color and turgor; no rash  Neurological: A&Ox1, no focal motor or sensory deficits noted    LABS:                        11.0   5.63  )-----------( 94       ( 14 Jun 2019 06:30 )             35.3     06-14    140  |  97<L>  |  20  ----------------------------<  140<H>  3.9   |  31  |  0.62    Ca    8.8      14 Jun 2019 06:30  Phos  2.7     06-14  Mg     1.8     06-14      RADIOLOGY & ADDITIONAL TESTS: Reviewed.

## 2019-06-15 NOTE — PROGRESS NOTE ADULT - PROBLEM SELECTOR PLAN 3
Patient with persistent liquid stool via rectal tube, possibly 2/2 abx induced vs infectious vs tube feeds.  - TF d/c'd yesterday, PO diet started  - Diarrhea now resolved  - abx course completed  - continue to monitor and encourage oral rehydration Patient with persistent liquid stool via rectal tube, now resolved after rectal tube removed and TF and abx were d/c'd and PO diet started  - continue to monitor and encourage oral rehydration

## 2019-06-15 NOTE — PROGRESS NOTE ADULT - PROBLEM SELECTOR PLAN 1
2/2 toxic encephalopathy in the setting of sepsis d/t pna and/or adrenal insufficiency, s/p extubation, now with improved mental status, no longer hypoxic or hypercapneic  - s/p 7 day course of zosyn for pna treatment  - continue to monitor respiratory status and vital signs

## 2019-06-16 PROCEDURE — 99233 SBSQ HOSP IP/OBS HIGH 50: CPT | Mod: GC

## 2019-06-16 RX ADMIN — CITALOPRAM 20 MILLIGRAM(S): 10 TABLET, FILM COATED ORAL at 12:43

## 2019-06-16 RX ADMIN — HEPARIN SODIUM 5000 UNIT(S): 5000 INJECTION INTRAVENOUS; SUBCUTANEOUS at 13:36

## 2019-06-16 RX ADMIN — Medication 500 MILLIGRAM(S): at 13:37

## 2019-06-16 RX ADMIN — Medication 25 MILLIGRAM(S): at 06:14

## 2019-06-16 RX ADMIN — PANTOPRAZOLE SODIUM 40 MILLIGRAM(S): 20 TABLET, DELAYED RELEASE ORAL at 12:43

## 2019-06-16 RX ADMIN — Medication 3 MILLIGRAM(S): at 21:24

## 2019-06-16 RX ADMIN — RISPERIDONE 3 MILLIGRAM(S): 4 TABLET ORAL at 06:14

## 2019-06-16 RX ADMIN — Medication 2: at 21:26

## 2019-06-16 RX ADMIN — Medication 500 MILLIGRAM(S): at 06:14

## 2019-06-16 RX ADMIN — RISPERIDONE 3 MILLIGRAM(S): 4 TABLET ORAL at 18:35

## 2019-06-16 RX ADMIN — Medication 1: at 17:54

## 2019-06-16 RX ADMIN — Medication 25 MILLIGRAM(S): at 21:26

## 2019-06-16 RX ADMIN — Medication 500 MILLIGRAM(S): at 21:25

## 2019-06-16 RX ADMIN — Medication 0.5 MILLIGRAM(S): at 06:14

## 2019-06-16 RX ADMIN — HEPARIN SODIUM 5000 UNIT(S): 5000 INJECTION INTRAVENOUS; SUBCUTANEOUS at 21:24

## 2019-06-16 RX ADMIN — Medication 2: at 12:43

## 2019-06-16 RX ADMIN — Medication 0.5 MILLIGRAM(S): at 17:54

## 2019-06-16 RX ADMIN — Medication 25 MILLIGRAM(S): at 13:36

## 2019-06-16 RX ADMIN — HEPARIN SODIUM 5000 UNIT(S): 5000 INJECTION INTRAVENOUS; SUBCUTANEOUS at 06:14

## 2019-06-16 NOTE — PROGRESS NOTE ADULT - PROBLEM SELECTOR PLAN 7
Home meds initially held d/t bradycardia/hypotension in the ICU, hydralazine was added for BP control  - Will c/w patient's home hctz 25mg

## 2019-06-16 NOTE — PROGRESS NOTE ADULT - PROBLEM SELECTOR PLAN 3
Patient with persistent liquid stool via rectal tube, now resolved after rectal tube removed and TF and abx were d/c'd and PO diet started  - continue to monitor and encourage oral rehydration

## 2019-06-16 NOTE — PROGRESS NOTE ADULT - SUBJECTIVE AND OBJECTIVE BOX
Patient is a 69y old  Female who presents with a chief complaint of Lethargy (15 Edwin 2019 07:08)      Jose Velasquez  EM/IM PGY1    SUBJECTIVE / OVERNIGHT EVENTS:    MEDICATIONS  (STANDING):  benztropine 0.5 milliGRAM(s) Oral two times a day  citalopram 20 milliGRAM(s) Oral daily  dextrose 5%. 1000 milliLiter(s) (50 mL/Hr) IV Continuous <Continuous>  dextrose 50% Injectable 12.5 Gram(s) IV Push once  dextrose 50% Injectable 25 Gram(s) IV Push once  dextrose 50% Injectable 25 Gram(s) IV Push once  heparin  Injectable 5000 Unit(s) SubCutaneous every 8 hours  hydrALAZINE 25 milliGRAM(s) Oral every 8 hours  hydrochlorothiazide 25 milliGRAM(s) Oral daily  insulin lispro (HumaLOG) corrective regimen sliding scale   SubCutaneous Before meals and at bedtime  melatonin 3 milliGRAM(s) Oral at bedtime  pantoprazole  Injectable 40 milliGRAM(s) IV Push daily  risperiDONE   Solution 3 milliGRAM(s) Oral two times a day  valproic  acid Syrup 500 milliGRAM(s) Oral three times a day    MEDICATIONS  (PRN):  benzocaine 15 mG/menthol 3.6 mG Lozenge 1 Lozenge Oral every 6 hours PRN Sore Throat  dextrose 40% Gel 15 Gram(s) Oral once PRN Blood Glucose LESS THAN 70 milliGRAM(s)/deciliter  glucagon  Injectable 1 milliGRAM(s) IntraMuscular once PRN Glucose LESS THAN 70 milligrams/deciliter      Vital Signs Last 24 Hrs  T(C): 36.4 (16 Jun 2019 05:14), Max: 36.4 (16 Jun 2019 05:14)  T(F): 97.5 (16 Jun 2019 05:14), Max: 97.5 (16 Jun 2019 05:14)  HR: 68 (16 Jun 2019 05:14) (68 - 74)  BP: 144/72 (16 Jun 2019 05:14) (133/61 - 144/72)  BP(mean): --  RR: 17 (16 Jun 2019 05:14) (17 - 18)  SpO2: 100% (16 Jun 2019 05:14) (100% - 100%)  CAPILLARY BLOOD GLUCOSE      POCT Blood Glucose.: 167 mg/dL (15 Edwin 2019 22:13)  POCT Blood Glucose.: 209 mg/dL (15 Edwin 2019 17:38)  POCT Blood Glucose.: 240 mg/dL (15 Edwin 2019 11:56)  POCT Blood Glucose.: 193 mg/dL (15 Edwin 2019 08:37)    I&O's Summary      PHYSICAL EXAM:  GENERAL: NAD, well-developed  HEAD:  Atraumatic, Normocephalic  EYES: EOMI, PERRLA, conjunctiva and sclera clear  NECK: Supple, No JVD  CHEST/LUNG: Clear to auscultation bilaterally; No wheeze  HEART: Regular rate and rhythm; No murmurs, rubs, or gallops  ABDOMEN: Soft, Nontender, Nondistended; Bowel sounds present  EXTREMITIES:  2+ Peripheral Pulses, No clubbing, cyanosis, or edema  PSYCH: AAOx3  NEUROLOGY: non-focal  SKIN: No rashes or lesions    LABS:          RADIOLOGY & ADDITIONAL TESTS: Reviewed. Patient is a 69y old  Female who presents with a chief complaint of Lethargy (15 Edwin 2019 07:08)      Jose Velasquez  EM/IM PGY1    SUBJECTIVE / OVERNIGHT EVENTS: No acute events overnight. Pt reports feeling well, denies any pain or complaints at this time. Comfortably watching television and eating breakfast during interview this AM.    MEDICATIONS  (STANDING):  benztropine 0.5 milliGRAM(s) Oral two times a day  citalopram 20 milliGRAM(s) Oral daily  dextrose 5%. 1000 milliLiter(s) (50 mL/Hr) IV Continuous <Continuous>  dextrose 50% Injectable 12.5 Gram(s) IV Push once  dextrose 50% Injectable 25 Gram(s) IV Push once  dextrose 50% Injectable 25 Gram(s) IV Push once  heparin  Injectable 5000 Unit(s) SubCutaneous every 8 hours  hydrALAZINE 25 milliGRAM(s) Oral every 8 hours  hydrochlorothiazide 25 milliGRAM(s) Oral daily  insulin lispro (HumaLOG) corrective regimen sliding scale   SubCutaneous Before meals and at bedtime  melatonin 3 milliGRAM(s) Oral at bedtime  pantoprazole  Injectable 40 milliGRAM(s) IV Push daily  risperiDONE   Solution 3 milliGRAM(s) Oral two times a day  valproic  acid Syrup 500 milliGRAM(s) Oral three times a day    MEDICATIONS  (PRN):  benzocaine 15 mG/menthol 3.6 mG Lozenge 1 Lozenge Oral every 6 hours PRN Sore Throat  dextrose 40% Gel 15 Gram(s) Oral once PRN Blood Glucose LESS THAN 70 milliGRAM(s)/deciliter  glucagon  Injectable 1 milliGRAM(s) IntraMuscular once PRN Glucose LESS THAN 70 milligrams/deciliter      Vital Signs Last 24 Hrs  T(C): 36.4 (16 Jun 2019 05:14), Max: 36.4 (16 Jun 2019 05:14)  T(F): 97.5 (16 Jun 2019 05:14), Max: 97.5 (16 Jun 2019 05:14)  HR: 68 (16 Jun 2019 05:14) (68 - 74)  BP: 144/72 (16 Jun 2019 05:14) (133/61 - 144/72)  BP(mean): --  RR: 17 (16 Jun 2019 05:14) (17 - 18)  SpO2: 100% (16 Jun 2019 05:14) (100% - 100%)  CAPILLARY BLOOD GLUCOSE      POCT Blood Glucose.: 167 mg/dL (15 Edwin 2019 22:13)  POCT Blood Glucose.: 209 mg/dL (15 Edwin 2019 17:38)  POCT Blood Glucose.: 240 mg/dL (15 Edwin 2019 11:56)  POCT Blood Glucose.: 193 mg/dL (15 Edwin 2019 08:37)    I&O's Summary      PHYSICAL EXAM:  General: NAD  HEENT: NC/AT; PERRL, clear conjunctiva  Neck: supple  Respiratory: grossly CTA b/l  Cardiovascular: +S1/S2; RRR  Abdomen: soft, NT/ND; +BS x4  Extremities: WWP, 2+ peripheral pulses b/l; no LE edema  Skin: normal color and turgor; no rash  Neurological: A&Ox1, no focal motor or sensory deficits noted    LABS:          RADIOLOGY & ADDITIONAL TESTS: Reviewed.

## 2019-06-17 LAB
ALBUMIN SERPL ELPH-MCNC: 3.1 G/DL — LOW (ref 3.3–5)
ALP SERPL-CCNC: 60 U/L — SIGNIFICANT CHANGE UP (ref 40–120)
ALT FLD-CCNC: 19 U/L — SIGNIFICANT CHANGE UP (ref 4–33)
ANION GAP SERPL CALC-SCNC: 12 MMO/L — SIGNIFICANT CHANGE UP (ref 7–14)
AST SERPL-CCNC: 20 U/L — SIGNIFICANT CHANGE UP (ref 4–32)
BASOPHILS # BLD AUTO: 0.01 K/UL — SIGNIFICANT CHANGE UP (ref 0–0.2)
BASOPHILS NFR BLD AUTO: 0.2 % — SIGNIFICANT CHANGE UP (ref 0–2)
BILIRUB SERPL-MCNC: < 0.2 MG/DL — LOW (ref 0.2–1.2)
BUN SERPL-MCNC: 48 MG/DL — HIGH (ref 7–23)
CALCIUM SERPL-MCNC: 9.1 MG/DL — SIGNIFICANT CHANGE UP (ref 8.4–10.5)
CHLORIDE SERPL-SCNC: 95 MMOL/L — LOW (ref 98–107)
CO2 SERPL-SCNC: 31 MMOL/L — SIGNIFICANT CHANGE UP (ref 22–31)
CREAT SERPL-MCNC: 0.92 MG/DL — SIGNIFICANT CHANGE UP (ref 0.5–1.3)
EOSINOPHIL # BLD AUTO: 0.21 K/UL — SIGNIFICANT CHANGE UP (ref 0–0.5)
EOSINOPHIL NFR BLD AUTO: 3.3 % — SIGNIFICANT CHANGE UP (ref 0–6)
GLUCOSE SERPL-MCNC: 213 MG/DL — HIGH (ref 70–99)
HCT VFR BLD CALC: 32.3 % — LOW (ref 34.5–45)
HGB BLD-MCNC: 10.1 G/DL — LOW (ref 11.5–15.5)
IMM GRANULOCYTES NFR BLD AUTO: 1 % — SIGNIFICANT CHANGE UP (ref 0–1.5)
LYMPHOCYTES # BLD AUTO: 1.53 K/UL — SIGNIFICANT CHANGE UP (ref 1–3.3)
LYMPHOCYTES # BLD AUTO: 24.3 % — SIGNIFICANT CHANGE UP (ref 13–44)
MAGNESIUM SERPL-MCNC: 2.1 MG/DL — SIGNIFICANT CHANGE UP (ref 1.6–2.6)
MCHC RBC-ENTMCNC: 29.8 PG — SIGNIFICANT CHANGE UP (ref 27–34)
MCHC RBC-ENTMCNC: 31.3 % — LOW (ref 32–36)
MCV RBC AUTO: 95.3 FL — SIGNIFICANT CHANGE UP (ref 80–100)
MONOCYTES # BLD AUTO: 0.73 K/UL — SIGNIFICANT CHANGE UP (ref 0–0.9)
MONOCYTES NFR BLD AUTO: 11.6 % — SIGNIFICANT CHANGE UP (ref 2–14)
NEUTROPHILS # BLD AUTO: 3.76 K/UL — SIGNIFICANT CHANGE UP (ref 1.8–7.4)
NEUTROPHILS NFR BLD AUTO: 59.6 % — SIGNIFICANT CHANGE UP (ref 43–77)
NRBC # FLD: 0 K/UL — SIGNIFICANT CHANGE UP (ref 0–0)
PHOSPHATE SERPL-MCNC: 2.7 MG/DL — SIGNIFICANT CHANGE UP (ref 2.5–4.5)
PLATELET # BLD AUTO: 176 K/UL — SIGNIFICANT CHANGE UP (ref 150–400)
PMV BLD: 13.5 FL — HIGH (ref 7–13)
POTASSIUM SERPL-MCNC: 3.7 MMOL/L — SIGNIFICANT CHANGE UP (ref 3.5–5.3)
POTASSIUM SERPL-SCNC: 3.7 MMOL/L — SIGNIFICANT CHANGE UP (ref 3.5–5.3)
PROT SERPL-MCNC: 5.8 G/DL — LOW (ref 6–8.3)
RBC # BLD: 3.39 M/UL — LOW (ref 3.8–5.2)
RBC # FLD: 13.8 % — SIGNIFICANT CHANGE UP (ref 10.3–14.5)
SODIUM SERPL-SCNC: 138 MMOL/L — SIGNIFICANT CHANGE UP (ref 135–145)
WBC # BLD: 6.3 K/UL — SIGNIFICANT CHANGE UP (ref 3.8–10.5)
WBC # FLD AUTO: 6.3 K/UL — SIGNIFICANT CHANGE UP (ref 3.8–10.5)

## 2019-06-17 PROCEDURE — 99233 SBSQ HOSP IP/OBS HIGH 50: CPT | Mod: GC

## 2019-06-17 RX ADMIN — HEPARIN SODIUM 5000 UNIT(S): 5000 INJECTION INTRAVENOUS; SUBCUTANEOUS at 14:36

## 2019-06-17 RX ADMIN — Medication 1: at 22:50

## 2019-06-17 RX ADMIN — Medication 500 MILLIGRAM(S): at 14:36

## 2019-06-17 RX ADMIN — HEPARIN SODIUM 5000 UNIT(S): 5000 INJECTION INTRAVENOUS; SUBCUTANEOUS at 05:42

## 2019-06-17 RX ADMIN — Medication 0.5 MILLIGRAM(S): at 17:46

## 2019-06-17 RX ADMIN — Medication 500 MILLIGRAM(S): at 05:41

## 2019-06-17 RX ADMIN — PANTOPRAZOLE SODIUM 40 MILLIGRAM(S): 20 TABLET, DELAYED RELEASE ORAL at 12:40

## 2019-06-17 RX ADMIN — RISPERIDONE 3 MILLIGRAM(S): 4 TABLET ORAL at 17:46

## 2019-06-17 RX ADMIN — Medication 3 MILLIGRAM(S): at 22:50

## 2019-06-17 RX ADMIN — CITALOPRAM 20 MILLIGRAM(S): 10 TABLET, FILM COATED ORAL at 12:40

## 2019-06-17 RX ADMIN — HEPARIN SODIUM 5000 UNIT(S): 5000 INJECTION INTRAVENOUS; SUBCUTANEOUS at 22:49

## 2019-06-17 RX ADMIN — RISPERIDONE 3 MILLIGRAM(S): 4 TABLET ORAL at 05:41

## 2019-06-17 RX ADMIN — Medication 2: at 12:40

## 2019-06-17 RX ADMIN — Medication 25 MILLIGRAM(S): at 05:41

## 2019-06-17 RX ADMIN — Medication 500 MILLIGRAM(S): at 22:50

## 2019-06-17 RX ADMIN — Medication 25 MILLIGRAM(S): at 14:36

## 2019-06-17 RX ADMIN — Medication 25 MILLIGRAM(S): at 22:50

## 2019-06-17 RX ADMIN — Medication 25 MILLIGRAM(S): at 05:42

## 2019-06-17 RX ADMIN — Medication 0.5 MILLIGRAM(S): at 05:41

## 2019-06-17 NOTE — DISCHARGE NOTE PROVIDER - HOSPITAL COURSE
69F PMH HTN, dementia, schizoaffective disorder who presented with lethargy and altered mental status. History provided by daughter at bedside. She has been feeling week for the past week and unable to swallow her medications. She was seen at an OSH recently and was started on HCTX for leg swelling. This morning, patient became confused with slurred speech. Patient could not recognize daughter so she was concern and brought her to the ED. Endorses cough for 1 day. Denies fever, SOB, nausea/vomiting, dysuria. No sick contacts, no recent travels.         ED course    - Ceftriaxone 1g    - PCO2 72    - 3L IVF given     - Intubated for hypercarbic respiratory failure         While in the MICU the patient was evaluated for possible adrenal insufficiency. She received pushes of dexamethasone and it seemed as if she did not have an appropriate response initially so she was placed on hydrocortisone standing. She required minimal support on the ventilator and she was extubated on 6/10. Her hypothermia resolved during her first day here. She was bradycardic at NSR with a rate that would go down to the 30s, but would always maintain a blood pressure and did not require atropine or other therapies. She returned to her baseline after extubation and was made eligible to the floors. 68 y/o F PMH HTN, dementia, schizoaffective disorder who presented with lethargy and altered mental status described by daughter as increased confusion, weakness, inability to swallow her medications. On arrival to ED pt was noted to be in hypoxic hypercarbic respiratory failure and was intubated and admitted to the MICU. While in the MICU the patient was evaluated for possible adrenal insufficiency. She received pushes of dexamethasone and it seemed as if she did not have an appropriate response initially so she was placed on hydrocortisone standing. She required minimal support on the ventilator and she was extubated on 6/10. Her hypothermia resolved during her first day here. She was bradycardic at NSR with a rate that would go down to the 30s, but would always maintain a blood pressure and did not require atropine or other therapies. She returned to her baseline after extubation and was made eligible to the floors. 70 y/o F PMH HTN, dementia, schizoaffective disorder who presented with lethargy and altered mental status described by daughter as increased confusion, weakness, inability to swallow her medications. On arrival to ED pt was noted to be in hypoxic hypercarbic respiratory failure likely 2/2 toxic encephalopathy in the setting of sepsis d/t pna and was intubated and admitted to the MICU. While in the MICU the patient was evaluated for possible adrenal insufficiency due to low cortisol level, but w/u was negative. She was given a 7 day course of zosyn for pna. She required minimal support on the ventilator and she was extubated on 6/10/19. Her hypothermia resolved during her first day here. She was bradycardic at NSR with a rate that would go down to the 30s, but would always maintain a blood pressure and did not require atropine or other therapies. She returned to her baseline after extubation and was made eligible to the floors.        Once on the floors patient's pancytopenia continued to improve and vital signs remained within normal limits. 70 y/o F PMH HTN, dementia, schizoaffective disorder who presented with lethargy and altered mental status described by daughter as increased confusion, weakness, inability to swallow her medications. On arrival to ED pt was noted to be in hypoxic hypercarbic respiratory failure likely 2/2 toxic encephalopathy in the setting of sepsis d/t pna and was intubated and admitted to the MICU. While in the MICU the patient was evaluated for possible adrenal insufficiency due to low cortisol level, but w/u was negative. She was given a 7 day course of zosyn for pna. She required minimal support on the ventilator and she was extubated on 6/10/19. Her hypothermia resolved during her first day here. She was bradycardic at NSR with a rate that would go down to the 30s, but would always maintain a blood pressure and did not require atropine or other therapies. She returned to her baseline after extubation and was made eligible to the floors.        Once on the floors patient's pancytopenia continued to improve and vital signs remained within normal limits. She experienced some persistent diarrhea, however this resolved after rectal tube was removed and tube feeds were discontinued. S&S evaluated her and recommended dysphagia 1 diet. PT evaluated patient and recommended rehab on discharge. Patient medically stable for discharge but became hypothermic to 94.1F rectally. Labwork was repeated including blood cxs and UA which were all unremarkable except for a relative increase in BUN/Cr (51/1.06), which resolved with some gentle IVF hydration (33/0.78). Patient currently at baseline mental status, alert, appearing clinically well. Also spoke with patient's outpatient psychiatrist Dr. Quintanilla who does not recommend any changes in her psychiatric medication regimen at this point as she has been on stable regimen for 3 years, and will follow as outpatient. Patient medically stabilized for discharge to rehab for further evaluation and management.

## 2019-06-17 NOTE — PROGRESS NOTE ADULT - PROBLEM SELECTOR PLAN 2
Initially hypothermic and pancytopenic, with likely source aspiration pneumonia, s/p 7 day course of zosyn now resolved with normalized WBC count and stable vital signs. Initially hypothermic and pancytopenic, with likely source aspiration pneumonia, s/p 7 day course of zosyn now resolved with normalized WBC count and stable vital signs.  Pt hypothermic today to 94.1 rectally, no S&S of infection  - will repeat labs, obtain blood cxs, monitor vital signs

## 2019-06-17 NOTE — DISCHARGE NOTE NURSING/CASE MANAGEMENT/SOCIAL WORK - NSDCDPATPORTLINK_GEN_ALL_CORE
You can access the [facility] Portal, offered by [facility], by registering with the following website: [email or web address]

## 2019-06-17 NOTE — DISCHARGE NOTE PROVIDER - CARE PROVIDER_API CALL
Jose M Cm (MD)  [facility]  [address] [place] [address]  Phone: [phone]  Fax: [phone]  Follow Up Time: 2 weeks

## 2019-06-17 NOTE — DISCHARGE NOTE NURSING/CASE MANAGEMENT/SOCIAL WORK - NSDCVIVACCINE_GEN_ALL_CORE_FT
Influenza , 2014/10/21 19:52 , Sherlyn De Anda (RN)  Pneumococcal , 2014/10/21 19:56 , Sherlyn De Anda (RN)

## 2019-06-17 NOTE — DISCHARGE NOTE PROVIDER - NSDCCPCAREPLAN_GEN_ALL_CORE_FT
PRINCIPAL DISCHARGE DIAGNOSIS  Diagnosis: Sepsis, due to unspecified organism  Assessment and Plan of Treatment: You were brought to the hospital for increased confusion and fatigue. This was likely due to a severe infection which we believe was pneumonia. You had a breathing tube temporarily while we treated your infection which was removed and you were able to breath without difficulty on your own. You were treated with a 7 day course of antibiotics and your vital signs, labwork and mental status improved significantly. You will be discharged to rehab to regain your strength. Return to the hospital for any recurrent fevers, difficulty breathing, abdominal pain, vomiting, diarrhea, urinary symptoms, or another new or concerning symptoms.      SECONDARY DISCHARGE DIAGNOSES  Diagnosis: Hypertension  Assessment and Plan of Treatment: Continue to take hydralazine 25mg every 8 hours and hydrochlorhothiazide 25mg once per day as this helped maintain your blood pressure in a normal range while in the hospital. Return to the hospital for any headaches, vision changes, chest pain, palpitations, lightheadedness/fainting, numbness, weakness or any other new or concerning symptoms.    Diagnosis: Schizoaffective disorder  Assessment and Plan of Treatment: Continue to take your medications as prescribed by Dr. Quintanilla.    Diagnosis: Diabetes  Assessment and Plan of Treatment: You were found to have elevated blood glucose with a Hgb A1c of 8.9 which is indicative of diabetes. You should follow-up with your primary doctor as soon as possible for further evaluation and management to start medication management for this.

## 2019-06-18 DIAGNOSIS — T68.XXXA HYPOTHERMIA, INITIAL ENCOUNTER: ICD-10-CM

## 2019-06-18 LAB
ALBUMIN SERPL ELPH-MCNC: 2.8 G/DL — LOW (ref 3.3–5)
ALP SERPL-CCNC: 44 U/L — SIGNIFICANT CHANGE UP (ref 40–120)
ALT FLD-CCNC: 16 U/L — SIGNIFICANT CHANGE UP (ref 4–33)
ANION GAP SERPL CALC-SCNC: 11 MMO/L — SIGNIFICANT CHANGE UP (ref 7–14)
ANION GAP SERPL CALC-SCNC: 14 MMO/L — SIGNIFICANT CHANGE UP (ref 7–14)
APPEARANCE UR: CLEAR — SIGNIFICANT CHANGE UP
AST SERPL-CCNC: 17 U/L — SIGNIFICANT CHANGE UP (ref 4–32)
BASOPHILS # BLD AUTO: 0.01 K/UL — SIGNIFICANT CHANGE UP (ref 0–0.2)
BASOPHILS NFR BLD AUTO: 0.1 % — SIGNIFICANT CHANGE UP (ref 0–2)
BILIRUB SERPL-MCNC: < 0.2 MG/DL — LOW (ref 0.2–1.2)
BILIRUB UR-MCNC: NEGATIVE — SIGNIFICANT CHANGE UP
BLOOD UR QL VISUAL: NEGATIVE — SIGNIFICANT CHANGE UP
BUN SERPL-MCNC: 44 MG/DL — HIGH (ref 7–23)
BUN SERPL-MCNC: 51 MG/DL — HIGH (ref 7–23)
CALCIUM SERPL-MCNC: 8.4 MG/DL — SIGNIFICANT CHANGE UP (ref 8.4–10.5)
CALCIUM SERPL-MCNC: 8.6 MG/DL — SIGNIFICANT CHANGE UP (ref 8.4–10.5)
CHLORIDE SERPL-SCNC: 93 MMOL/L — LOW (ref 98–107)
CHLORIDE SERPL-SCNC: 96 MMOL/L — LOW (ref 98–107)
CO2 SERPL-SCNC: 29 MMOL/L — SIGNIFICANT CHANGE UP (ref 22–31)
CO2 SERPL-SCNC: 30 MMOL/L — SIGNIFICANT CHANGE UP (ref 22–31)
COLOR SPEC: SIGNIFICANT CHANGE UP
CREAT SERPL-MCNC: 0.84 MG/DL — SIGNIFICANT CHANGE UP (ref 0.5–1.3)
CREAT SERPL-MCNC: 1.06 MG/DL — SIGNIFICANT CHANGE UP (ref 0.5–1.3)
EOSINOPHIL # BLD AUTO: 0.09 K/UL — SIGNIFICANT CHANGE UP (ref 0–0.5)
EOSINOPHIL NFR BLD AUTO: 1.3 % — SIGNIFICANT CHANGE UP (ref 0–6)
GLUCOSE SERPL-MCNC: 157 MG/DL — HIGH (ref 70–99)
GLUCOSE SERPL-MCNC: 252 MG/DL — HIGH (ref 70–99)
GLUCOSE UR-MCNC: NEGATIVE — SIGNIFICANT CHANGE UP
HCT VFR BLD CALC: 29.2 % — LOW (ref 34.5–45)
HGB BLD-MCNC: 9 G/DL — LOW (ref 11.5–15.5)
IMM GRANULOCYTES NFR BLD AUTO: 1.3 % — SIGNIFICANT CHANGE UP (ref 0–1.5)
KETONES UR-MCNC: NEGATIVE — SIGNIFICANT CHANGE UP
LEUKOCYTE ESTERASE UR-ACNC: NEGATIVE — SIGNIFICANT CHANGE UP
LYMPHOCYTES # BLD AUTO: 1 K/UL — SIGNIFICANT CHANGE UP (ref 1–3.3)
LYMPHOCYTES # BLD AUTO: 14.4 % — SIGNIFICANT CHANGE UP (ref 13–44)
MAGNESIUM SERPL-MCNC: 2.1 MG/DL — SIGNIFICANT CHANGE UP (ref 1.6–2.6)
MAGNESIUM SERPL-MCNC: 2.2 MG/DL — SIGNIFICANT CHANGE UP (ref 1.6–2.6)
MCHC RBC-ENTMCNC: 29.3 PG — SIGNIFICANT CHANGE UP (ref 27–34)
MCHC RBC-ENTMCNC: 30.8 % — LOW (ref 32–36)
MCV RBC AUTO: 95.1 FL — SIGNIFICANT CHANGE UP (ref 80–100)
MONOCYTES # BLD AUTO: 0.85 K/UL — SIGNIFICANT CHANGE UP (ref 0–0.9)
MONOCYTES NFR BLD AUTO: 12.2 % — SIGNIFICANT CHANGE UP (ref 2–14)
NEUTROPHILS # BLD AUTO: 4.9 K/UL — SIGNIFICANT CHANGE UP (ref 1.8–7.4)
NEUTROPHILS NFR BLD AUTO: 70.7 % — SIGNIFICANT CHANGE UP (ref 43–77)
NITRITE UR-MCNC: NEGATIVE — SIGNIFICANT CHANGE UP
NRBC # FLD: 0 K/UL — SIGNIFICANT CHANGE UP (ref 0–0)
PH UR: 6 — SIGNIFICANT CHANGE UP (ref 5–8)
PHOSPHATE SERPL-MCNC: 2.9 MG/DL — SIGNIFICANT CHANGE UP (ref 2.5–4.5)
PHOSPHATE SERPL-MCNC: 3.6 MG/DL — SIGNIFICANT CHANGE UP (ref 2.5–4.5)
PLATELET # BLD AUTO: 176 K/UL — SIGNIFICANT CHANGE UP (ref 150–400)
PMV BLD: 13.7 FL — HIGH (ref 7–13)
POTASSIUM SERPL-MCNC: 4.1 MMOL/L — SIGNIFICANT CHANGE UP (ref 3.5–5.3)
POTASSIUM SERPL-MCNC: 4.3 MMOL/L — SIGNIFICANT CHANGE UP (ref 3.5–5.3)
POTASSIUM SERPL-SCNC: 4.1 MMOL/L — SIGNIFICANT CHANGE UP (ref 3.5–5.3)
POTASSIUM SERPL-SCNC: 4.3 MMOL/L — SIGNIFICANT CHANGE UP (ref 3.5–5.3)
PROT SERPL-MCNC: 5.2 G/DL — LOW (ref 6–8.3)
PROT UR-MCNC: NEGATIVE — SIGNIFICANT CHANGE UP
RBC # BLD: 3.07 M/UL — LOW (ref 3.8–5.2)
RBC # FLD: 14.1 % — SIGNIFICANT CHANGE UP (ref 10.3–14.5)
SODIUM SERPL-SCNC: 136 MMOL/L — SIGNIFICANT CHANGE UP (ref 135–145)
SODIUM SERPL-SCNC: 137 MMOL/L — SIGNIFICANT CHANGE UP (ref 135–145)
SP GR SPEC: 1.01 — SIGNIFICANT CHANGE UP (ref 1–1.04)
SPECIMEN SOURCE: SIGNIFICANT CHANGE UP
UROBILINOGEN FLD QL: NORMAL — SIGNIFICANT CHANGE UP
WBC # BLD: 6.94 K/UL — SIGNIFICANT CHANGE UP (ref 3.8–10.5)
WBC # FLD AUTO: 6.94 K/UL — SIGNIFICANT CHANGE UP (ref 3.8–10.5)

## 2019-06-18 PROCEDURE — 99233 SBSQ HOSP IP/OBS HIGH 50: CPT | Mod: GC

## 2019-06-18 PROCEDURE — 93010 ELECTROCARDIOGRAM REPORT: CPT

## 2019-06-18 RX ORDER — HEPARIN SODIUM 5000 [USP'U]/ML
5000 INJECTION INTRAVENOUS; SUBCUTANEOUS EVERY 8 HOURS
Refills: 0 | Status: DISCONTINUED | OUTPATIENT
Start: 2019-06-18 | End: 2019-06-19

## 2019-06-18 RX ORDER — SODIUM CHLORIDE 9 MG/ML
500 INJECTION, SOLUTION INTRAVENOUS
Refills: 0 | Status: DISCONTINUED | OUTPATIENT
Start: 2019-06-18 | End: 2019-06-19

## 2019-06-18 RX ADMIN — Medication 1: at 17:56

## 2019-06-18 RX ADMIN — HEPARIN SODIUM 5000 UNIT(S): 5000 INJECTION INTRAVENOUS; SUBCUTANEOUS at 22:32

## 2019-06-18 RX ADMIN — Medication 500 MILLIGRAM(S): at 05:25

## 2019-06-18 RX ADMIN — RISPERIDONE 3 MILLIGRAM(S): 4 TABLET ORAL at 05:26

## 2019-06-18 RX ADMIN — PANTOPRAZOLE SODIUM 40 MILLIGRAM(S): 20 TABLET, DELAYED RELEASE ORAL at 11:18

## 2019-06-18 RX ADMIN — Medication 25 MILLIGRAM(S): at 05:26

## 2019-06-18 RX ADMIN — HEPARIN SODIUM 5000 UNIT(S): 5000 INJECTION INTRAVENOUS; SUBCUTANEOUS at 05:26

## 2019-06-18 RX ADMIN — Medication 25 MILLIGRAM(S): at 21:46

## 2019-06-18 RX ADMIN — CITALOPRAM 20 MILLIGRAM(S): 10 TABLET, FILM COATED ORAL at 11:18

## 2019-06-18 RX ADMIN — Medication 500 MILLIGRAM(S): at 13:35

## 2019-06-18 RX ADMIN — Medication 3 MILLIGRAM(S): at 21:46

## 2019-06-18 RX ADMIN — SODIUM CHLORIDE 125 MILLILITER(S): 9 INJECTION, SOLUTION INTRAVENOUS at 11:17

## 2019-06-18 RX ADMIN — Medication 500 MILLIGRAM(S): at 21:46

## 2019-06-18 RX ADMIN — Medication 0.5 MILLIGRAM(S): at 17:56

## 2019-06-18 RX ADMIN — Medication 0.5 MILLIGRAM(S): at 05:26

## 2019-06-18 RX ADMIN — Medication 25 MILLIGRAM(S): at 13:35

## 2019-06-18 RX ADMIN — RISPERIDONE 3 MILLIGRAM(S): 4 TABLET ORAL at 17:56

## 2019-06-18 RX ADMIN — Medication 1: at 21:46

## 2019-06-18 RX ADMIN — HEPARIN SODIUM 5000 UNIT(S): 5000 INJECTION INTRAVENOUS; SUBCUTANEOUS at 13:35

## 2019-06-18 NOTE — PROGRESS NOTE ADULT - PROBLEM SELECTOR PLAN 4
Initially low cortisol, initial ACTH stimulation test negative however pt was on steroid, was planning to repeat ACTH stimulation test however AM cortisol elevated, AI ruled out. Patient with persistent liquid stool via rectal tube, now resolved after rectal tube removed and TF and abx were d/c'd and PO diet started  - continue to monitor and encourage oral rehydration

## 2019-06-18 NOTE — PROGRESS NOTE ADULT - PROBLEM SELECTOR PLAN 2
Initially hypothermic and pancytopenic, with likely source aspiration pneumonia, s/p 7 day course of zosyn now resolved with normalized WBC count and stable vital signs.  Pt hypothermic today to 94.1 rectally, no S&S of infection  - will repeat labs, obtain blood cxs, monitor vital signs 2/2 toxic encephalopathy in the setting of sepsis d/t pna and/or adrenal insufficiency, s/p extubation, now with improved mental status, no longer hypoxic or hypercapneic  - s/p 7 day course of zosyn for pna treatment  - continue to monitor respiratory status and vital signs

## 2019-06-18 NOTE — DIETITIAN INITIAL EVALUATION ADULT. - PERTINENT MEDS FT
MEDICATIONS  (STANDING):  benztropine 0.5 milliGRAM(s) Oral two times a day  citalopram 20 milliGRAM(s) Oral daily  dextrose 5%. 1000 milliLiter(s) (50 mL/Hr) IV Continuous <Continuous>  dextrose 50% Injectable 12.5 Gram(s) IV Push once  dextrose 50% Injectable 25 Gram(s) IV Push once  dextrose 50% Injectable 25 Gram(s) IV Push once  heparin  Injectable 5000 Unit(s) SubCutaneous every 8 hours  hydrALAZINE 25 milliGRAM(s) Oral every 8 hours  hydrochlorothiazide 25 milliGRAM(s) Oral daily  insulin lispro (HumaLOG) corrective regimen sliding scale   SubCutaneous Before meals and at bedtime  lactated ringers. 500 milliLiter(s) (125 mL/Hr) IV Continuous <Continuous>  melatonin 3 milliGRAM(s) Oral at bedtime  pantoprazole  Injectable 40 milliGRAM(s) IV Push daily  risperiDONE   Solution 3 milliGRAM(s) Oral two times a day  valproic  acid Syrup 500 milliGRAM(s) Oral three times a day    MEDICATIONS  (PRN):  benzocaine 15 mG/menthol 3.6 mG Lozenge 1 Lozenge Oral every 6 hours PRN Sore Throat  dextrose 40% Gel 15 Gram(s) Oral once PRN Blood Glucose LESS THAN 70 milliGRAM(s)/deciliter  glucagon  Injectable 1 milliGRAM(s) IntraMuscular once PRN Glucose LESS THAN 70 milligrams/deciliter  ---------------------------------------------------------------------------------------------------------------------------------  CAPILLARY BLOOD GLUCOSE  POCT Blood Glucose.: 120 mg/dL (18 Jun 2019 08:31)  POCT Blood Glucose.: 162 mg/dL (17 Jun 2019 22:45)  POCT Blood Glucose.: 128 mg/dL (17 Jun 2019 17:32)  POCT Blood Glucose.: 237 mg/dL (17 Jun 2019 12:37)  --------------------------------------------------------------------------------------------------------------------------------  Diet, Dysphagia 1 Pureed-Honey Consistency Fluid (06-12-19 @ 16:17)

## 2019-06-18 NOTE — PROGRESS NOTE ADULT - SUBJECTIVE AND OBJECTIVE BOX
Patient is a 69y old  Female who presents with a chief complaint of Lethargy (17 Jun 2019 13:33)      Jose Velasquez  EM/IM PGY1    SUBJECTIVE / OVERNIGHT EVENTS:    MEDICATIONS  (STANDING):  benztropine 0.5 milliGRAM(s) Oral two times a day  citalopram 20 milliGRAM(s) Oral daily  dextrose 5%. 1000 milliLiter(s) (50 mL/Hr) IV Continuous <Continuous>  dextrose 50% Injectable 12.5 Gram(s) IV Push once  dextrose 50% Injectable 25 Gram(s) IV Push once  dextrose 50% Injectable 25 Gram(s) IV Push once  heparin  Injectable 5000 Unit(s) SubCutaneous every 8 hours  hydrALAZINE 25 milliGRAM(s) Oral every 8 hours  hydrochlorothiazide 25 milliGRAM(s) Oral daily  insulin lispro (HumaLOG) corrective regimen sliding scale   SubCutaneous Before meals and at bedtime  melatonin 3 milliGRAM(s) Oral at bedtime  pantoprazole  Injectable 40 milliGRAM(s) IV Push daily  risperiDONE   Solution 3 milliGRAM(s) Oral two times a day  valproic  acid Syrup 500 milliGRAM(s) Oral three times a day    MEDICATIONS  (PRN):  benzocaine 15 mG/menthol 3.6 mG Lozenge 1 Lozenge Oral every 6 hours PRN Sore Throat  dextrose 40% Gel 15 Gram(s) Oral once PRN Blood Glucose LESS THAN 70 milliGRAM(s)/deciliter  glucagon  Injectable 1 milliGRAM(s) IntraMuscular once PRN Glucose LESS THAN 70 milligrams/deciliter      Vital Signs Last 24 Hrs  T(C): 36.2 (18 Jun 2019 05:15), Max: 37.1 (17 Jun 2019 22:24)  T(F): 97.1 (18 Jun 2019 05:15), Max: 98.7 (17 Jun 2019 22:24)  HR: 83 (18 Jun 2019 05:15) (67 - 83)  BP: 153/57 (18 Jun 2019 05:15) (120/55 - 153/57)  BP(mean): --  RR: 18 (18 Jun 2019 05:15) (18 - 18)  SpO2: 97% (18 Jun 2019 05:15) (97% - 100%)  CAPILLARY BLOOD GLUCOSE      POCT Blood Glucose.: 162 mg/dL (17 Jun 2019 22:45)  POCT Blood Glucose.: 128 mg/dL (17 Jun 2019 17:32)  POCT Blood Glucose.: 237 mg/dL (17 Jun 2019 12:37)  POCT Blood Glucose.: 135 mg/dL (17 Jun 2019 08:29)    I&O's Summary      PHYSICAL EXAM:  General: NAD  HEENT: NC/AT; PERRL, clear conjunctiva  Neck: supple  Respiratory: grossly CTA b/l  Cardiovascular: +S1/S2; RRR  Abdomen: soft, NT/ND; +BS x4  Extremities: WWP, 2+ peripheral pulses b/l; no LE edema  Skin: normal color and turgor; no rash  Neurological: A&Ox1, no focal motor or sensory deficits noted    LABS:                        10.1   6.30  )-----------( 176      ( 17 Jun 2019 14:17 )             32.3     06-17    138  |  95<L>  |  48<H>  ----------------------------<  213<H>  3.7   |  31  |  0.92    Ca    9.1      17 Jun 2019 14:17  Phos  2.7     06-17  Mg     2.1     06-17    TPro  5.8<L>  /  Alb  3.1<L>  /  TBili  < 0.2<L>  /  DBili  x   /  AST  20  /  ALT  19  /  AlkPhos  60  06-17              RADIOLOGY & ADDITIONAL TESTS: Reviewed. Patient is a 69y old  Female who presents with a chief complaint of Lethargy (17 Jun 2019 13:33)      Jose Velasquez  EM/IM PGY1    SUBJECTIVE / OVERNIGHT EVENTS: Patient hypothermic to 94.1F yesterday, blood cxs sent. No acute events overnight. This AM pt more somnolent than usual, arousable to voice however not responding to questions, occasionally responding appropriately to commands. Unable to obtain reliable ROS.      MEDICATIONS  (STANDING):  benztropine 0.5 milliGRAM(s) Oral two times a day  citalopram 20 milliGRAM(s) Oral daily  dextrose 5%. 1000 milliLiter(s) (50 mL/Hr) IV Continuous <Continuous>  dextrose 50% Injectable 12.5 Gram(s) IV Push once  dextrose 50% Injectable 25 Gram(s) IV Push once  dextrose 50% Injectable 25 Gram(s) IV Push once  heparin  Injectable 5000 Unit(s) SubCutaneous every 8 hours  hydrALAZINE 25 milliGRAM(s) Oral every 8 hours  hydrochlorothiazide 25 milliGRAM(s) Oral daily  insulin lispro (HumaLOG) corrective regimen sliding scale   SubCutaneous Before meals and at bedtime  melatonin 3 milliGRAM(s) Oral at bedtime  pantoprazole  Injectable 40 milliGRAM(s) IV Push daily  risperiDONE   Solution 3 milliGRAM(s) Oral two times a day  valproic  acid Syrup 500 milliGRAM(s) Oral three times a day    MEDICATIONS  (PRN):  benzocaine 15 mG/menthol 3.6 mG Lozenge 1 Lozenge Oral every 6 hours PRN Sore Throat  dextrose 40% Gel 15 Gram(s) Oral once PRN Blood Glucose LESS THAN 70 milliGRAM(s)/deciliter  glucagon  Injectable 1 milliGRAM(s) IntraMuscular once PRN Glucose LESS THAN 70 milligrams/deciliter      Vital Signs Last 24 Hrs  T(C): 36.2 (18 Jun 2019 05:15), Max: 37.1 (17 Jun 2019 22:24)  T(F): 97.1 (18 Jun 2019 05:15), Max: 98.7 (17 Jun 2019 22:24)  HR: 83 (18 Jun 2019 05:15) (67 - 83)  BP: 153/57 (18 Jun 2019 05:15) (120/55 - 153/57)  BP(mean): --  RR: 18 (18 Jun 2019 05:15) (18 - 18)  SpO2: 97% (18 Jun 2019 05:15) (97% - 100%)  CAPILLARY BLOOD GLUCOSE      POCT Blood Glucose.: 162 mg/dL (17 Jun 2019 22:45)  POCT Blood Glucose.: 128 mg/dL (17 Jun 2019 17:32)  POCT Blood Glucose.: 237 mg/dL (17 Jun 2019 12:37)  POCT Blood Glucose.: 135 mg/dL (17 Jun 2019 08:29)    I&O's Summary      PHYSICAL EXAM:  General: NAD  HEENT: NC/AT; PERRL, clear conjunctiva  Neck: supple  Respiratory: grossly CTA b/l  Cardiovascular: +S1/S2; RRR  Abdomen: soft, NT/ND; +BS x4  Extremities: WWP, 2+ peripheral pulses b/l; no LE edema  Skin: normal color and turgor; no rash  Neurological: A&Ox1, no focal motor or sensory deficits noted    LABS:                        10.1   6.30  )-----------( 176      ( 17 Jun 2019 14:17 )             32.3     06-17    138  |  95<L>  |  48<H>  ----------------------------<  213<H>  3.7   |  31  |  0.92    Ca    9.1      17 Jun 2019 14:17  Phos  2.7     06-17  Mg     2.1     06-17    TPro  5.8<L>  /  Alb  3.1<L>  /  TBili  < 0.2<L>  /  DBili  x   /  AST  20  /  ALT  19  /  AlkPhos  60  06-17              RADIOLOGY & ADDITIONAL TESTS: Reviewed. Patient is a 69y old  Female who presents with a chief complaint of Lethargy (17 Jun 2019 13:33)      Jose Velasquez  EM/IM PGY1    SUBJECTIVE / OVERNIGHT EVENTS: Patient hypothermic to 94.1F yesterday, blood cxs sent. No acute events overnight. This AM pt more somnolent than usual, arousable to voice however not responding to questions, occasionally responding appropriately to commands. Unable to obtain reliable ROS.      MEDICATIONS  (STANDING):  benztropine 0.5 milliGRAM(s) Oral two times a day  citalopram 20 milliGRAM(s) Oral daily  dextrose 5%. 1000 milliLiter(s) (50 mL/Hr) IV Continuous <Continuous>  dextrose 50% Injectable 12.5 Gram(s) IV Push once  dextrose 50% Injectable 25 Gram(s) IV Push once  dextrose 50% Injectable 25 Gram(s) IV Push once  heparin  Injectable 5000 Unit(s) SubCutaneous every 8 hours  hydrALAZINE 25 milliGRAM(s) Oral every 8 hours  hydrochlorothiazide 25 milliGRAM(s) Oral daily  insulin lispro (HumaLOG) corrective regimen sliding scale   SubCutaneous Before meals and at bedtime  melatonin 3 milliGRAM(s) Oral at bedtime  pantoprazole  Injectable 40 milliGRAM(s) IV Push daily  risperiDONE   Solution 3 milliGRAM(s) Oral two times a day  valproic  acid Syrup 500 milliGRAM(s) Oral three times a day    MEDICATIONS  (PRN):  benzocaine 15 mG/menthol 3.6 mG Lozenge 1 Lozenge Oral every 6 hours PRN Sore Throat  dextrose 40% Gel 15 Gram(s) Oral once PRN Blood Glucose LESS THAN 70 milliGRAM(s)/deciliter  glucagon  Injectable 1 milliGRAM(s) IntraMuscular once PRN Glucose LESS THAN 70 milligrams/deciliter      Vital Signs Last 24 Hrs  T(C): 36.2 (18 Jun 2019 05:15), Max: 37.1 (17 Jun 2019 22:24)  T(F): 97.1 (18 Jun 2019 05:15), Max: 98.7 (17 Jun 2019 22:24)  HR: 83 (18 Jun 2019 05:15) (67 - 83)  BP: 153/57 (18 Jun 2019 05:15) (120/55 - 153/57)  BP(mean): --  RR: 18 (18 Jun 2019 05:15) (18 - 18)  SpO2: 97% (18 Jun 2019 05:15) (97% - 100%)  CAPILLARY BLOOD GLUCOSE      POCT Blood Glucose.: 162 mg/dL (17 Jun 2019 22:45)  POCT Blood Glucose.: 128 mg/dL (17 Jun 2019 17:32)  POCT Blood Glucose.: 237 mg/dL (17 Jun 2019 12:37)  POCT Blood Glucose.: 135 mg/dL (17 Jun 2019 08:29)    I&O's Summary      PHYSICAL EXAM:  General: NAD  HEENT: NC/AT; PERRL, clear conjunctiva  Respiratory: grossly CTA b/l  Cardiovascular: +S1/S2; RRR  Abdomen: soft, NT/ND; +BS x4  Extremities: WWP, 2+ peripheral pulses b/l; no LE edema  Skin: normal color and turgor; no rash  Neurological: A&Ox1, somnolent but arousable to voice, no focal motor deficits noted, no pronator drift    LABS:                        10.1   6.30  )-----------( 176      ( 17 Jun 2019 14:17 )             32.3     06-17    138  |  95<L>  |  48<H>  ----------------------------<  213<H>  3.7   |  31  |  0.92    Ca    9.1      17 Jun 2019 14:17  Phos  2.7     06-17  Mg     2.1     06-17    TPro  5.8<L>  /  Alb  3.1<L>  /  TBili  < 0.2<L>  /  DBili  x   /  AST  20  /  ALT  19  /  AlkPhos  60  06-17              RADIOLOGY & ADDITIONAL TESTS: Reviewed.

## 2019-06-18 NOTE — DIETITIAN INITIAL EVALUATION ADULT. - OTHER INFO
Per nursing/PCA, Pt. with persistently good PO intake since PO diet initiated (6 days ago), up until this morning.  Pt. reported w increased lethargy & thus, 0% intake of breakfast.  Of note, prior to initiation of PO diet... Pt. was receiving enteral feedings of Glucerna 1.2 @ 40mL/hr x 18hrs (864Kcals) which would have been inadequate to meet Pt's. energy/nutrient needs.  Pt. unable to provide nutrition of weight Hx.  NKFA & no stated/noted nausea/vomiting/diarrhea/constipation at this time.  S/p swallow evaluation on 6.13.19 with SLP recommendation for pureed foods & honey thickened liquids.

## 2019-06-18 NOTE — PROGRESS NOTE ADULT - PROBLEM SELECTOR PLAN 3
Patient with persistent liquid stool via rectal tube, now resolved after rectal tube removed and TF and abx were d/c'd and PO diet started  - continue to monitor and encourage oral rehydration Initially hypothermic and pancytopenic, with likely source aspiration pneumonia, s/p 7 day course of zosyn now resolved with normalized WBC count and stable vital signs.  Pt hypothermic today to 94.1 rectally, no S&S of infection  - will repeat labs, obtain blood cxs, monitor vital signs Initially hypothermic and pancytopenic, with likely source aspiration pneumonia, s/p 7 day course of zosyn now resolved with normalized WBC count and stable vital signs.  Pt hypothermic yesterday to 94.1 rectally, no S&S of infection, normothermic today, labwork showing no evidence of infection, ?possible side effect of antipsychotic medications, will continue to monitor vital signs

## 2019-06-18 NOTE — DIETITIAN INITIAL EVALUATION ADULT. - ENERGY NEEDS
Current BMI= 25.3 kg/m2      1+ edema B/L feet.  No noted pressure injuries.  Possible weight decrease, likely fluid related changes.

## 2019-06-18 NOTE — DIETITIAN INITIAL EVALUATION ADULT. - NS AS NUTRI INTERV COLLABORAT
1)Add Glucerna Shake (thicken appropriately) 8oz PO 1x daily to Dysphagia I (pureed foods) w honey thickened liquids diet                   2)Please obtain daily weights to help more accurately assess weight trends                          RDN remains available.  Clotilde Murrell RDN, CDN  pager [phone]

## 2019-06-18 NOTE — PROGRESS NOTE ADULT - ATTENDING COMMENTS
Patient was seen and examined personally by me. I have discussed the plan and reviewed the resident's note and agree with the above physical exam findings including assessment and plan except as indicated below.    No S/S of infection, labs unremarkable, UA negative, 6/17 Bcx NGTD  Hypothermia possibly from risperdal or valproic?  Adrenal insufficiency was ruled out earlier in admission with elevated cortisol, TSH WNL  EKG: NSR and QTC WNL  IVF started for rising BUN, encourage PO hydration

## 2019-06-18 NOTE — PROGRESS NOTE ADULT - PROBLEM SELECTOR PLAN 1
2/2 toxic encephalopathy in the setting of sepsis d/t pna and/or adrenal insufficiency, s/p extubation, now with improved mental status, no longer hypoxic or hypercapneic  - s/p 7 day course of zosyn for pna treatment  - continue to monitor respiratory status and vital signs Bcx NGTD, UA negative, CXR clear  Possible side effect of antipsych meds: risperdal or valproic  Continue to monitor Bcx NGTD, UA negative, CXR clear, TSH wnl, AI r/o w/ normal am cortisol, BMI wnl low suspicion for malnourishment  Possible side effect of antipsych meds: risperdal or valproic  Continue to monitor

## 2019-06-18 NOTE — DIETITIAN INITIAL EVALUATION ADULT. - PERTINENT LABORATORY DATA
06-18 Na137 mmol/L Glu 157 mg/dL<H> K+ 4.3 mmol/L Cr  1.06 mg/dL BUN 51 mg/dL<H> 06-18 Phos 3.6 mg/dL 06-18 Alb 2.8 g/dL<L> 06-08 [id number] 8.9 %<H> 06-08 Chol 151 mg/dL LDL 58 mg/dL HDL 76 mg/dL<H> Trig 71 mg/dL

## 2019-06-19 VITALS — TEMPERATURE: 98 F

## 2019-06-19 LAB
ALBUMIN SERPL ELPH-MCNC: 2.9 G/DL — LOW (ref 3.3–5)
ALP SERPL-CCNC: 45 U/L — SIGNIFICANT CHANGE UP (ref 40–120)
ALT FLD-CCNC: 12 U/L — SIGNIFICANT CHANGE UP (ref 4–33)
ANION GAP SERPL CALC-SCNC: 11 MMO/L — SIGNIFICANT CHANGE UP (ref 7–14)
AST SERPL-CCNC: 12 U/L — SIGNIFICANT CHANGE UP (ref 4–32)
BASOPHILS # BLD AUTO: 0 K/UL — SIGNIFICANT CHANGE UP (ref 0–0.2)
BASOPHILS NFR BLD AUTO: 0 % — SIGNIFICANT CHANGE UP (ref 0–2)
BILIRUB SERPL-MCNC: < 0.2 MG/DL — LOW (ref 0.2–1.2)
BUN SERPL-MCNC: 33 MG/DL — HIGH (ref 7–23)
CALCIUM SERPL-MCNC: 8.6 MG/DL — SIGNIFICANT CHANGE UP (ref 8.4–10.5)
CHLORIDE SERPL-SCNC: 99 MMOL/L — SIGNIFICANT CHANGE UP (ref 98–107)
CO2 SERPL-SCNC: 32 MMOL/L — HIGH (ref 22–31)
CREAT SERPL-MCNC: 0.78 MG/DL — SIGNIFICANT CHANGE UP (ref 0.5–1.3)
EOSINOPHIL # BLD AUTO: 0.11 K/UL — SIGNIFICANT CHANGE UP (ref 0–0.5)
EOSINOPHIL NFR BLD AUTO: 1.2 % — SIGNIFICANT CHANGE UP (ref 0–6)
GLUCOSE SERPL-MCNC: 112 MG/DL — HIGH (ref 70–99)
HCT VFR BLD CALC: 29.5 % — LOW (ref 34.5–45)
HGB BLD-MCNC: 9.2 G/DL — LOW (ref 11.5–15.5)
IMM GRANULOCYTES NFR BLD AUTO: 0.6 % — SIGNIFICANT CHANGE UP (ref 0–1.5)
LYMPHOCYTES # BLD AUTO: 1.65 K/UL — SIGNIFICANT CHANGE UP (ref 1–3.3)
LYMPHOCYTES # BLD AUTO: 18.6 % — SIGNIFICANT CHANGE UP (ref 13–44)
MAGNESIUM SERPL-MCNC: 2.2 MG/DL — SIGNIFICANT CHANGE UP (ref 1.6–2.6)
MCHC RBC-ENTMCNC: 29.8 PG — SIGNIFICANT CHANGE UP (ref 27–34)
MCHC RBC-ENTMCNC: 31.2 % — LOW (ref 32–36)
MCV RBC AUTO: 95.5 FL — SIGNIFICANT CHANGE UP (ref 80–100)
MONOCYTES # BLD AUTO: 1.44 K/UL — HIGH (ref 0–0.9)
MONOCYTES NFR BLD AUTO: 16.3 % — HIGH (ref 2–14)
NEUTROPHILS # BLD AUTO: 5.6 K/UL — SIGNIFICANT CHANGE UP (ref 1.8–7.4)
NEUTROPHILS NFR BLD AUTO: 63.3 % — SIGNIFICANT CHANGE UP (ref 43–77)
NRBC # FLD: 0 K/UL — SIGNIFICANT CHANGE UP (ref 0–0)
PHOSPHATE SERPL-MCNC: 3.3 MG/DL — SIGNIFICANT CHANGE UP (ref 2.5–4.5)
PLATELET # BLD AUTO: 165 K/UL — SIGNIFICANT CHANGE UP (ref 150–400)
PMV BLD: 13.5 FL — HIGH (ref 7–13)
POTASSIUM SERPL-MCNC: 3.8 MMOL/L — SIGNIFICANT CHANGE UP (ref 3.5–5.3)
POTASSIUM SERPL-SCNC: 3.8 MMOL/L — SIGNIFICANT CHANGE UP (ref 3.5–5.3)
PROT SERPL-MCNC: 5.4 G/DL — LOW (ref 6–8.3)
RBC # BLD: 3.09 M/UL — LOW (ref 3.8–5.2)
RBC # FLD: 14.3 % — SIGNIFICANT CHANGE UP (ref 10.3–14.5)
SODIUM SERPL-SCNC: 142 MMOL/L — SIGNIFICANT CHANGE UP (ref 135–145)
WBC # BLD: 8.85 K/UL — SIGNIFICANT CHANGE UP (ref 3.8–10.5)
WBC # FLD AUTO: 8.85 K/UL — SIGNIFICANT CHANGE UP (ref 3.8–10.5)

## 2019-06-19 PROCEDURE — 99239 HOSP IP/OBS DSCHRG MGMT >30: CPT | Mod: GC

## 2019-06-19 RX ORDER — PANTOPRAZOLE SODIUM 20 MG/1
40 TABLET, DELAYED RELEASE ORAL
Refills: 0 | Status: DISCONTINUED | OUTPATIENT
Start: 2019-06-19 | End: 2019-06-19

## 2019-06-19 RX ORDER — HYDRALAZINE HCL 50 MG
1 TABLET ORAL
Qty: 0 | Refills: 0 | DISCHARGE
Start: 2019-06-19

## 2019-06-19 RX ORDER — VALSARTAN 80 MG/1
1 TABLET ORAL
Qty: 0 | Refills: 0 | DISCHARGE

## 2019-06-19 RX ADMIN — RISPERIDONE 3 MILLIGRAM(S): 4 TABLET ORAL at 17:34

## 2019-06-19 RX ADMIN — Medication 500 MILLIGRAM(S): at 06:22

## 2019-06-19 RX ADMIN — HEPARIN SODIUM 5000 UNIT(S): 5000 INJECTION INTRAVENOUS; SUBCUTANEOUS at 06:23

## 2019-06-19 RX ADMIN — HEPARIN SODIUM 5000 UNIT(S): 5000 INJECTION INTRAVENOUS; SUBCUTANEOUS at 13:49

## 2019-06-19 RX ADMIN — Medication 0.5 MILLIGRAM(S): at 06:22

## 2019-06-19 RX ADMIN — CITALOPRAM 20 MILLIGRAM(S): 10 TABLET, FILM COATED ORAL at 12:42

## 2019-06-19 RX ADMIN — PANTOPRAZOLE SODIUM 40 MILLIGRAM(S): 20 TABLET, DELAYED RELEASE ORAL at 13:49

## 2019-06-19 RX ADMIN — Medication 25 MILLIGRAM(S): at 06:23

## 2019-06-19 RX ADMIN — Medication 1: at 12:40

## 2019-06-19 RX ADMIN — Medication 500 MILLIGRAM(S): at 13:49

## 2019-06-19 RX ADMIN — Medication 25 MILLIGRAM(S): at 06:22

## 2019-06-19 RX ADMIN — Medication 0.5 MILLIGRAM(S): at 17:34

## 2019-06-19 RX ADMIN — RISPERIDONE 3 MILLIGRAM(S): 4 TABLET ORAL at 06:22

## 2019-06-19 RX ADMIN — Medication 25 MILLIGRAM(S): at 13:49

## 2019-06-19 NOTE — PROGRESS NOTE ADULT - PROBLEM SELECTOR PROBLEM 9
Preventive measure

## 2019-06-19 NOTE — PROGRESS NOTE ADULT - PROBLEM SELECTOR PLAN 3
Initially hypothermic and pancytopenic, with likely source aspiration pneumonia, s/p 7 day course of zosyn now resolved with normalized WBC count and stable vital signs.  Pt hypothermic yesterday to 94.1 rectally, no S&S of infection, normothermic today after gentle IVF hydration yesterday, labwork showing no evidence of infection, repeat blood cxs NGTD, will continue to monitor vital signs

## 2019-06-19 NOTE — PROGRESS NOTE ADULT - PROBLEM SELECTOR PLAN 2
2/2 toxic encephalopathy in the setting of sepsis d/t pna, s/p extubation, now with improved mental status, no longer hypoxic or hypercapneic or hypothermic  - s/p 7 day course of zosyn for pna treatment  - continue to monitor respiratory status and vital signs

## 2019-06-19 NOTE — PROGRESS NOTE ADULT - ATTENDING COMMENTS
Patient was seen and examined personally by me. I have discussed the plan and reviewed the resident's note and agree with the above physical exam findings including assessment and plan except as indicated below.    No S/S of infection, labs unremarkable, UA negative, 6/17 Bcx NGTD  Hypothermia possibly from risperdal or valproic? Outpatient psychiatrist doesn't feel like it is related. Hypothermia now resolved  Uremia improved with IVF hydration and mental status improved today  Adrenal insufficiency was ruled out earlier in admission with elevated cortisol, TSH WNL  Stable for DC to rehab. DC time: 32 min

## 2019-06-19 NOTE — PROGRESS NOTE ADULT - PROBLEM SELECTOR PLAN 8
H/o schizoaffective disorder  - C/w pt's home medications including Citalopram 20 mg QD, Risperidone 3 mg BID, Valproic acid 1500 mg QHS, Benztropine 0.5 mg QD  - Per patient's outpatient psychiatrist Dr. Quintanilla would not alter patient's psychiatric medication regimen at this time as pt has been on this regimen for 3 years and was very sensitive to changes in these medications in the past

## 2019-06-19 NOTE — PROGRESS NOTE ADULT - NSHPATTENDINGPLANDISCUSS_GEN_ALL_CORE
MICU team
pt, daughter, resident, RN
team
MICU team
pt and team
MICU team
team
HS
HS
pt, resident, RN
HS
pt, resident, RN

## 2019-06-19 NOTE — PROGRESS NOTE ADULT - REASON FOR ADMISSION
Lethargy

## 2019-06-19 NOTE — PROGRESS NOTE ADULT - PROBLEM SELECTOR PLAN 1
Bcx NGTD, UA negative, CXR clear, TSH wnl, AI ruled out w/ normal am cortisol, BMI wnl low suspicion for malnourishment, now resolved after gentle IVF hydration yesterday  - spoke with patient's outpatient psychiatrist Dr. Quintanilla, she has low suspicion this is side effect of psych meds as pt has been on same medications and dosages for 3 years without this side effect  - Continue to monitor Bcx NGTD, UA negative, CXR clear, TSH wnl, AI ruled out w/ normal am cortisol, BMI wnl low suspicion for malnourishment, now resolved after gentle IVF hydration yesterday ?2/2 mild uremia  - spoke with patient's outpatient psychiatrist Dr. Quintanilla, she has low suspicion this is side effect of psych meds as pt has been on same medications and dosages for 3 years without this side effect  - Continue to monitor

## 2019-06-19 NOTE — PROGRESS NOTE ADULT - SUBJECTIVE AND OBJECTIVE BOX
Patient is a 69y old  Female who presents with a chief complaint of Lethargy (2019 07:52)      Jose Velasquez  EM/IM PGY1    SUBJECTIVE / OVERNIGHT EVENTS:    MEDICATIONS  (STANDING):  benztropine 0.5 milliGRAM(s) Oral two times a day  citalopram 20 milliGRAM(s) Oral daily  dextrose 5%. 1000 milliLiter(s) (50 mL/Hr) IV Continuous <Continuous>  dextrose 50% Injectable 12.5 Gram(s) IV Push once  dextrose 50% Injectable 25 Gram(s) IV Push once  dextrose 50% Injectable 25 Gram(s) IV Push once  heparin  Injectable 5000 Unit(s) SubCutaneous every 8 hours  hydrALAZINE 25 milliGRAM(s) Oral every 8 hours  hydrochlorothiazide 25 milliGRAM(s) Oral daily  insulin lispro (HumaLOG) corrective regimen sliding scale   SubCutaneous Before meals and at bedtime  melatonin 3 milliGRAM(s) Oral at bedtime  pantoprazole  Injectable 40 milliGRAM(s) IV Push daily  risperiDONE   Solution 3 milliGRAM(s) Oral two times a day  valproic  acid Syrup 500 milliGRAM(s) Oral three times a day    MEDICATIONS  (PRN):  benzocaine 15 mG/menthol 3.6 mG Lozenge 1 Lozenge Oral every 6 hours PRN Sore Throat  dextrose 40% Gel 15 Gram(s) Oral once PRN Blood Glucose LESS THAN 70 milliGRAM(s)/deciliter  glucagon  Injectable 1 milliGRAM(s) IntraMuscular once PRN Glucose LESS THAN 70 milligrams/deciliter      Vital Signs Last 24 Hrs  T(C): 36.7 (2019 06:09), Max: 37.4 (2019 13:08)  T(F): 98.1 (2019 06:09), Max: 99.3 (2019 13:08)  HR: 60 (2019 06:09) (60 - 79)  BP: 123/54 (2019 06:09) (123/54 - 138/60)  BP(mean): --  RR: 18 (2019 06:09) (18 - 18)  SpO2: 98% (2019 06:09) (98% - 98%)  CAPILLARY BLOOD GLUCOSE      POCT Blood Glucose.: 116 mg/dL (2019 08:24)  POCT Blood Glucose.: 190 mg/dL (2019 21:44)  POCT Blood Glucose.: 175 mg/dL (2019 17:46)  POCT Blood Glucose.: 137 mg/dL (2019 12:35)    I&O's Summary      PHYSICAL EXAM:      LABS:                        9.2    8.85  )-----------( 165      ( 2019 06:50 )             29.5         142  |  99  |  33<H>  ----------------------------<  112<H>  3.8   |  32<H>  |  0.78    Ca    8.6      2019 06:50  Phos  3.3       Mg     2.2         TPro  5.4<L>  /  Alb  2.9<L>  /  TBili  < 0.2<L>  /  DBili  x   /  AST  12  /  ALT  12  /  AlkPhos  45            Urinalysis Basic - ( 2019 15:00 )    Color: LIGHT YELLOW / Appearance: CLEAR / S.015 / pH: 6.0  Gluc: NEGATIVE / Ketone: NEGATIVE  / Bili: NEGATIVE / Urobili: NORMAL   Blood: NEGATIVE / Protein: NEGATIVE / Nitrite: NEGATIVE   Leuk Esterase: NEGATIVE / RBC: x / WBC x   Sq Epi: x / Non Sq Epi: x / Bacteria: x        RADIOLOGY & ADDITIONAL TESTS:    Imaging Personally Reviewed:    Consultant(s) Notes Reviewed:      Care Discussed with Consultants/Other Providers: Patient is a 69y old  Female who presents with a chief complaint of Lethargy (2019 07:52)      Jose Velasquez  EM/IM PGY1    SUBJECTIVE / OVERNIGHT EVENTS: No acute events overnight. Pt alert, smiling on exam, A&Ox1 but following commands appropriately. Tolerating breakfast without difficulty. She denies any headache, vision changes, trouble breathing, numbness, weakness or other complaints at this time.    MEDICATIONS  (STANDING):  benztropine 0.5 milliGRAM(s) Oral two times a day  citalopram 20 milliGRAM(s) Oral daily  dextrose 5%. 1000 milliLiter(s) (50 mL/Hr) IV Continuous <Continuous>  dextrose 50% Injectable 12.5 Gram(s) IV Push once  dextrose 50% Injectable 25 Gram(s) IV Push once  dextrose 50% Injectable 25 Gram(s) IV Push once  heparin  Injectable 5000 Unit(s) SubCutaneous every 8 hours  hydrALAZINE 25 milliGRAM(s) Oral every 8 hours  hydrochlorothiazide 25 milliGRAM(s) Oral daily  insulin lispro (HumaLOG) corrective regimen sliding scale   SubCutaneous Before meals and at bedtime  melatonin 3 milliGRAM(s) Oral at bedtime  pantoprazole  Injectable 40 milliGRAM(s) IV Push daily  risperiDONE   Solution 3 milliGRAM(s) Oral two times a day  valproic  acid Syrup 500 milliGRAM(s) Oral three times a day    MEDICATIONS  (PRN):  benzocaine 15 mG/menthol 3.6 mG Lozenge 1 Lozenge Oral every 6 hours PRN Sore Throat  dextrose 40% Gel 15 Gram(s) Oral once PRN Blood Glucose LESS THAN 70 milliGRAM(s)/deciliter  glucagon  Injectable 1 milliGRAM(s) IntraMuscular once PRN Glucose LESS THAN 70 milligrams/deciliter      Vital Signs Last 24 Hrs  T(C): 36.7 (2019 06:09), Max: 37.4 (2019 13:08)  T(F): 98.1 (2019 06:09), Max: 99.3 (2019 13:08)  HR: 60 (2019 06:09) (60 - 79)  BP: 123/54 (2019 06:09) (123/54 - 138/60)  BP(mean): --  RR: 18 (2019 06:09) (18 - 18)  SpO2: 98% (2019 06:09) (98% - 98%)  CAPILLARY BLOOD GLUCOSE      POCT Blood Glucose.: 116 mg/dL (2019 08:24)  POCT Blood Glucose.: 190 mg/dL (2019 21:44)  POCT Blood Glucose.: 175 mg/dL (2019 17:46)  POCT Blood Glucose.: 137 mg/dL (2019 12:35)    I&O's Summary      PHYSICAL EXAM:  General: NAD, alert, smiling and eating breakfast  HEENT: NC/AT; PERRL, clear conjunctiva  Respiratory: grossly CTA b/l  Cardiovascular: +S1/S2; RRR  Abdomen: soft, NT/ND; +BS x4  Extremities: WWP, 2+ peripheral pulses b/l; no LE edema  Skin: normal color and turgor; no rash  Neurological: A&Ox1, alert, no focal motor deficits noted, following commands appropriately    LABS:                        9.2    8.85  )-----------( 165      ( 2019 06:50 )             29.5     06-19    142  |  99  |  33<H>  ----------------------------<  112<H>  3.8   |  32<H>  |  0.78    Ca    8.6      2019 06:50  Phos  3.3     06-19  Mg     2.2     -19    TPro  5.4<L>  /  Alb  2.9<L>  /  TBili  < 0.2<L>  /  DBili  x   /  AST  12  /  ALT  12  /  AlkPhos  45  06-19          Urinalysis Basic - ( 2019 15:00 )    Color: LIGHT YELLOW / Appearance: CLEAR / S.015 / pH: 6.0  Gluc: NEGATIVE / Ketone: NEGATIVE  / Bili: NEGATIVE / Urobili: NORMAL   Blood: NEGATIVE / Protein: NEGATIVE / Nitrite: NEGATIVE   Leuk Esterase: NEGATIVE / RBC: x / WBC x   Sq Epi: x / Non Sq Epi: x / Bacteria: x        RADIOLOGY & ADDITIONAL TESTS: Reviewed.

## 2019-06-22 LAB — BACTERIA BLD CULT: SIGNIFICANT CHANGE UP

## 2019-06-24 ENCOUNTER — EMERGENCY (EMERGENCY)
Facility: HOSPITAL | Age: 69
LOS: 1 days | Discharge: HOME | End: 2019-06-24
Attending: EMERGENCY MEDICINE
Payer: COMMERCIAL

## 2019-06-24 VITALS
HEART RATE: 76 BPM | HEIGHT: 66 IN | RESPIRATION RATE: 18 BRPM | DIASTOLIC BLOOD PRESSURE: 84 MMHG | WEIGHT: 119.93 LBS | SYSTOLIC BLOOD PRESSURE: 144 MMHG

## 2019-06-24 VITALS
OXYGEN SATURATION: 100 % | TEMPERATURE: 98 F | HEART RATE: 82 BPM | RESPIRATION RATE: 18 BRPM | SYSTOLIC BLOOD PRESSURE: 170 MMHG | DIASTOLIC BLOOD PRESSURE: 86 MMHG

## 2019-06-24 PROCEDURE — 99284 EMERGENCY DEPT VISIT MOD MDM: CPT | Mod: GC

## 2019-06-24 PROCEDURE — 71045 X-RAY EXAM CHEST 1 VIEW: CPT | Mod: 26

## 2019-06-24 PROCEDURE — 71045 X-RAY EXAM CHEST 1 VIEW: CPT

## 2019-06-24 PROCEDURE — 72170 X-RAY EXAM OF PELVIS: CPT

## 2019-06-24 PROCEDURE — 70450 CT HEAD/BRAIN W/O DYE: CPT | Mod: 26

## 2019-06-24 PROCEDURE — 72170 X-RAY EXAM OF PELVIS: CPT | Mod: 26

## 2019-06-24 PROCEDURE — 70450 CT HEAD/BRAIN W/O DYE: CPT

## 2019-06-24 PROCEDURE — 99284 EMERGENCY DEPT VISIT MOD MDM: CPT | Mod: 25

## 2019-06-24 RX ORDER — ACETAMINOPHEN 500 MG
650 TABLET ORAL ONCE
Refills: 0 | Status: COMPLETED | OUTPATIENT
Start: 2019-06-24 | End: 2019-06-24

## 2019-06-24 RX ADMIN — Medication 650 MILLIGRAM(S): at 06:11

## 2019-06-24 NOTE — ED PROVIDER NOTE - NSFOLLOWUPINSTRUCTIONS_ED_ALL_ED_FT
- Follow up with your doctor  - May take tylenol as needed for pain  - Return to ED for new or worsening symptoms

## 2019-06-24 NOTE — ED PROVIDER NOTE - CLINICAL SUMMARY MEDICAL DECISION MAKING FREE TEXT BOX
Attending Sina Rodríguez DO: 68 yo female hx of Dementia, A&Ox 2 at baseline per EMS and nursing home report presents sp fall at nursing home a 230 am this morning. Pt complaining of mild headache since fall. On exam, head NCAT, no midline spinal ttp, ab soft nt/nd, pelvis stable, normal strength and sensation x 4, no abrasions or lacerations present. Will obtain ct head, xray chest and pelvis, tylenol for pain, if neg likely dc home.

## 2019-06-24 NOTE — ED ADULT NURSE NOTE - OBJECTIVE STATEMENT
70 y/o female presented to the ED via EMS from [facility], pt per EMS fell at the facility early in the morning. is not on blood thinners, unsure if patient lost consciousness. pt states has pain in the back of the head. no open wounds noted. no bruising or deformities noted on patient. pt has hx of dementia and Schizo affective disorder. pt is A&Ox2, is confused at baseline. pt keeps stating she wants to go to [facility]. states she remembers falling and hitting head, no other complaints at this time. VSS. Dr. Rodríguez next to bedside. awaiting MD dispo

## 2019-06-24 NOTE — ED ADULT NURSE REASSESSMENT NOTE - NS ED NURSE REASSESS COMMENT FT1
Received awake Confuded Hx of dementia Resp even and nonlab Cooperative and compliant D/c Pend nonemerg ambulance.

## 2019-06-24 NOTE — ED ADULT NURSE NOTE - CHPI ED NUR SYMPTOMS NEG
no tingling/no deformity/no bleeding/no vomiting/no weakness/no loss of consciousness/no abrasion/no confusion/no numbness/no fever

## 2019-06-24 NOTE — ED PROVIDER NOTE - OBJECTIVE STATEMENT
69yof w/ dementia, schizoaffective fell at the nursing home and hit her head. Complains of posterior head pain. Pt states she lost balance while moving clothes but actual history is unclear. No apparent injuries reported. No anticoagulation

## 2019-06-24 NOTE — ED ADULT NURSE NOTE - NSIMPLEMENTINTERV_GEN_ALL_ED
Implemented All Fall Risk Interventions:  [place] to call system. Call bell, personal items and telephone within reach. Instruct patient to call for assistance. Room bathroom lighting operational. Non-slip footwear when patient is off stretcher. Physically safe environment: no spills, clutter or unnecessary equipment. Stretcher in lowest position, wheels locked, appropriate side rails in place. Provide visual cue, wrist band, yellow gown, etc. Monitor gait and stability. Monitor for mental status changes and reorient to person, place, and time. Review medications for side effects contributing to fall risk. Reinforce activity limits and safety measures with patient and family.

## 2019-06-24 NOTE — ED PROVIDER NOTE - PROGRESS NOTE DETAILS
Attending Sina Rodríguez DO: Pt at baseline mental status, no acute changes in ED. CT scan and xrays neg. Will dc back to rehab.

## 2019-11-29 ENCOUNTER — EMERGENCY (EMERGENCY)
Facility: HOSPITAL | Age: 69
LOS: 1 days | Discharge: HOME | End: 2019-11-29
Attending: EMERGENCY MEDICINE
Payer: MEDICARE

## 2019-11-29 VITALS
DIASTOLIC BLOOD PRESSURE: 88 MMHG | HEART RATE: 89 BPM | TEMPERATURE: 98 F | OXYGEN SATURATION: 100 % | SYSTOLIC BLOOD PRESSURE: 173 MMHG | RESPIRATION RATE: 18 BRPM

## 2019-11-29 VITALS
OXYGEN SATURATION: 97 % | TEMPERATURE: 98 F | RESPIRATION RATE: 16 BRPM | SYSTOLIC BLOOD PRESSURE: 156 MMHG | HEART RATE: 83 BPM | DIASTOLIC BLOOD PRESSURE: 78 MMHG

## 2019-11-29 PROBLEM — I10 ESSENTIAL (PRIMARY) HYPERTENSION: Chronic | Status: ACTIVE | Noted: 2019-06-24

## 2019-11-29 PROBLEM — F03.90 UNSPECIFIED DEMENTIA WITHOUT BEHAVIORAL DISTURBANCE: Chronic | Status: ACTIVE | Noted: 2019-06-24

## 2019-11-29 PROBLEM — F25.9 SCHIZOAFFECTIVE DISORDER, UNSPECIFIED: Chronic | Status: ACTIVE | Noted: 2019-06-24

## 2019-11-29 PROCEDURE — 72170 X-RAY EXAM OF PELVIS: CPT

## 2019-11-29 PROCEDURE — 73562 X-RAY EXAM OF KNEE 3: CPT

## 2019-11-29 PROCEDURE — 76377 3D RENDER W/INTRP POSTPROCES: CPT | Mod: 26

## 2019-11-29 PROCEDURE — 70486 CT MAXILLOFACIAL W/O DYE: CPT

## 2019-11-29 PROCEDURE — 72170 X-RAY EXAM OF PELVIS: CPT | Mod: 26

## 2019-11-29 PROCEDURE — 99284 EMERGENCY DEPT VISIT MOD MDM: CPT | Mod: GC

## 2019-11-29 PROCEDURE — 70486 CT MAXILLOFACIAL W/O DYE: CPT | Mod: 26

## 2019-11-29 PROCEDURE — 99284 EMERGENCY DEPT VISIT MOD MDM: CPT

## 2019-11-29 PROCEDURE — 70450 CT HEAD/BRAIN W/O DYE: CPT

## 2019-11-29 PROCEDURE — 73562 X-RAY EXAM OF KNEE 3: CPT | Mod: 26,50

## 2019-11-29 PROCEDURE — 76377 3D RENDER W/INTRP POSTPROCES: CPT

## 2019-11-29 PROCEDURE — 70450 CT HEAD/BRAIN W/O DYE: CPT | Mod: 26

## 2019-11-29 RX ORDER — ACETAMINOPHEN 500 MG
650 TABLET ORAL ONCE
Refills: 0 | Status: COMPLETED | OUTPATIENT
Start: 2019-11-29 | End: 2019-11-29

## 2019-11-29 RX ADMIN — Medication 650 MILLIGRAM(S): at 02:45

## 2019-11-29 NOTE — ED PROVIDER NOTE - OBJECTIVE STATEMENT
69F PMH dementia, schizoaffective disorder, DM, HTN sent from NH for eval of abrasion to face after visiting her family at home. Pt states she tripped over her foot, and fell forward onto her face and BL knees. Denies preceding lightheadedness, CP, SOB. No LOC and was ambulatory immediately after the event. Pt currently c/o pain at site of abrasion over her nasal bridge and BL knee pain. No pain meds taken PTA. No f/c, HA, n/v, CP, SOB, abdominal pain, dysuria. 69F PMH dementia, schizoaffective disorder, DM, HTN sent from NH for eval of abrasion to face after visiting her family at home. Pt states she tripped over her foot, and fell forward onto her face and BL knees. Denies preceding lightheadedness, CP, SOB. No LOC and was ambulatory immediately after the event. Pt currently c/o pain at site of abrasion over forehead and BL knee pain. No pain meds taken PTA. No f/c, HA, n/v, CP, SOB, abdominal pain, dysuria.

## 2019-11-29 NOTE — ED ADULT NURSE NOTE - OBJECTIVE STATEMENT
Patient is a 69y female presenting to the ED via EMS from [facility] s/p fall. Patient A&Ox3. Patient left the facility to go to family's house for dinner. According to EMS, the family states they witnessed the fall. As per patient she "tripped and fell while walking from the bathroom to the kitchen." States her family was not around and that she was able to get up off the ground on her own strength. Reports hitting her head on the floor. Denies LOC. Displays a 1 inch laceration on the middle of the forehead and ecchymosis on the right temporal aspect of the head and below the right eye. PMH of dementia, HTN, DM type 2, major depressive disorder. Patient is a 69y female presenting to the ED via EMS from [facility] s/p fall. Patient A&Ox3. Patient left the facility to go to family's house for dinner. According to EMS, the family states they witnessed the fall. As per patient she "tripped and fell while walking from the bathroom to the kitchen." States her family was not around and that she was able to get up off the ground on her own strength. Reports hitting her head on the floor. Denies LOC. Patient is poor historian, the history she has reported has changed several times. Displays a 1 inch laceration on the middle of the forehead and ecchymosis on the right temporal aspect of the head and below the right eye. Reports C-spine tenderness, and states she has 7/10 pain behind the left knee which started after the fall. PMH of dementia, HTN, DM type 2, major depressive disorder. Patient is a 69y female presenting to the ED via EMS from [facility] s/p fall. Patient A&Ox3. Patient left the facility to go to family's house for dinner. According to EMS, the family states they witnessed the fall. As per patient she "tripped and fell while walking from the bathroom to the kitchen." States her family was not around and that she was able to get up off the ground on her own strength. Reports hitting her head on the floor. Denies LOC. Patient is poor historian, the history she has reported has changed several times. Displays a 1 inch laceration on the middle of the forehead and ecchymosis on the right temporal aspect of the head and below the right eye. Reports C-spine tenderness, and states she has 7/10 pain behind the left knee which started after the fall. PERRL. Heart sounds heard upon auscultation. Lung sounds clear bilaterally. PMH of dementia, HTN, DM type 2, major depressive disorder.

## 2019-11-29 NOTE — ED PROVIDER NOTE - CARE PLAN
Principal Discharge DX:	Knee pain, bilateral  Secondary Diagnosis:	Abrasion of face, initial encounter

## 2019-11-29 NOTE — ED PROVIDER NOTE - NSFOLLOWUPINSTRUCTIONS_ED_ALL_ED_FT
You were seen in the ER for knee pain and an abrasion to your face.    1. Follow up with your primary care doctor within 48 hours.    2. You may take Ibuprofen (Motrin, Advil) or Acetaminophen (Tylenol) as needed, as directed on packaging for pain.    3. Return to the ER for any new or worsening symptoms or concerns, such as fever, chills, worsening pain, inability to eat or drink, etc. You were seen in the ER for knee pain and an abrasion to your face.    1. Follow up with your primary care doctor within 48 hours.    2. You may take Ibuprofen (Motrin, Advil) or Acetaminophen (Tylenol) as needed, as directed on packaging for pain.    3. Return to the ER for any new or worsening symptoms or concerns, such as headache with nausea/vomiting, difficulty breathing, inability to walk, etc.

## 2019-11-29 NOTE — ED ADULT NURSE NOTE - NSIMPLEMENTINTERV_GEN_ALL_ED
Implemented All Fall Risk Interventions:  [place] to call system. Call bell, personal items and telephone within reach. Instruct patient to call for assistance. Room bathroom lighting operational. Non-slip footwear when patient is off stretcher. Physically safe environment: no spills, clutter or unnecessary equipment. Stretcher in lowest position, wheels locked, appropriate side rails in place. Provide visual cue, wrist band, yellow gown, etc. Monitor gait and stability. Monitor for mental status changes and reorient to person, place, and time. Review medications for side effects contributing to fall risk. Reinforce activity limits and safety measures with patient and family.

## 2019-11-29 NOTE — ED ADULT TRIAGE NOTE - CHIEF COMPLAINT QUOTE
demented pt from [facility] home went out w family returned w abrasion to forehead   unknown event

## 2019-11-29 NOTE — ED PROVIDER NOTE - PHYSICAL EXAMINATION
I have reviewed the triage vital signs.  Const: AAOx3, laying comfortably in NAD  Eyes: PERRL, no conjunctival injection  HENT: NCAT, Neck supple without meningismus, mild swelling and TTP over nasal bridge, oral mucosa moist  CV: RRR, no obvious murmurs, rubs, or gallops appreciated  Resp: CTAB, no respiratory distress, no wheezes, rales, or rhonchi  GI: Abdomen soft, NTND, no guarding, no CVA tenderness  Extremities: No peripheral edema,  2+ radial and DP pulses  Skin: +1.5cm abrasion above nasal bridge. 1cm abrasion to R cheek. No abrasions over BL knees  MSK: No gross deformities appreciated. No midline tenderness. +BL patella TTP without erythema, mild swelling BL knees  Neuro: No focal sensory or motor deficits, CN 2-12 grossly intact  Psych: Appropriate mood and affect I have reviewed the triage vital signs.  Const: AAOx3, laying comfortably in NAD  Eyes: PERRL, no conjunctival injection  HENT: NCAT, Neck supple without meningismus, mild swelling and TTP over nasal bridge, oral mucosa moist  CV: RRR, no obvious murmurs, rubs, or gallops appreciated  Resp: CTAB, no respiratory distress, no wheezes, rales, or rhonchi  GI: Abdomen soft, NTND, no guarding, no CVA tenderness  Extremities: No peripheral edema,  2+ radial and DP pulses  Skin: +1.5cm abrasion mid forehead. No abrasions over BL knees  MSK: No gross deformities appreciated. No midline tenderness. +BL patella TTP without erythema, mild swelling BL knees  Neuro: No focal sensory or motor deficits, CN 2-12 grossly intact  Psych: Appropriate mood and affect

## 2019-11-29 NOTE — ED PROVIDER NOTE - ATTENDING CONTRIBUTION TO CARE
mechanical fall now with facial abrasion, diffuse neck pain. on exam, well appearing, neuro intact, small abrasion as above, non-tender cervical spine, no chest, abd, back, spine tenderness. no extremity trauma other than bilat knee tenderness. imaging neg for acute process. no other apparent emergent process found. pt safe for d/c home with supportive care of contusion.

## 2019-11-29 NOTE — ED PROVIDER NOTE - PATIENT PORTAL LINK FT
You can access the FollowMyHealth Patient Portal offered by [facility] by registering at the following website: [email or web address]. By joining [facility]’s FollowMyHealth portal, you will also be able to view your health information using other applications (apps) compatible with our system.

## 2019-11-29 NOTE — ED PROVIDER NOTE - CLINICAL SUMMARY MEDICAL DECISION MAKING FREE TEXT BOX
Sergey, PGY1 - 69F PMH dementia sent from NH for evaluation of facial abrasion. TTP over nasal bridge, BL knees. Pt A&Ox3, but given h/o dementia will evaluate for ICH, fx. Plan: CTH, CT maxillofacial, XR BL knees, pain control.

## 2019-11-29 NOTE — ED PROVIDER NOTE - PROGRESS NOTE DETAILS
Sergey, PGY1 – Pt was re-evaluated at bedside, pain improved. Denies SI/HI. Results were discussed with patient as well as return precautions and follow up plan with PCP and/or specialist. Time was taken to answer any questions that the patient had before providing them with discharge paperwork.

## 2022-01-27 NOTE — DISCHARGE NOTE NURSING/CASE MANAGEMENT/SOCIAL WORK - NSDCPEPTCAREGIVEDUMATLIST _GEN_ALL_CORE
Problem: Skin Integrity:  Goal: Will show no infection signs and symptoms  Description: Will show no infection signs and symptoms  Outcome: Ongoing  Goal: Absence of new skin breakdown  Description: Absence of new skin breakdown  Outcome: Ongoing     Problem: Infection:  Goal: Will remain free from infection  Description: Will remain free from infection  Outcome: Ongoing     Problem: Safety:  Goal: Free from accidental physical injury  Description: Free from accidental physical injury  Outcome: Ongoing  Goal: Free from intentional harm  Description: Free from intentional harm  Outcome: Ongoing     Problem: Daily Care:  Goal: Daily care needs are met  Description: Daily care needs are met  Outcome: Ongoing     Problem: Pain:  Goal: Patient's pain/discomfort is manageable  Description: Patient's pain/discomfort is manageable  Outcome: Ongoing     Problem: Skin Integrity:  Goal: Skin integrity will stabilize  Description: Skin integrity will stabilize  Outcome: Ongoing     Problem: Discharge Planning:  Goal: Patients continuum of care needs are met  Description: Patients continuum of care needs are met  Outcome: Ongoing Diabetes

## 2022-12-20 NOTE — PROVIDER CONTACT NOTE (OTHER) - ASSESSMENT
Subjective    Patient: Marcelo Wise   MRN: [id number]  : 1980    Reason For Visit  Patient presents today with Office Visit, Follow-up, and  Symptoms      HPI   12 hours of UTI sx, ( cloudy urine, dysuria, freq urination, slight odor) years ago she had a pyelonephritis. Pt just finished her menses a couple of days  Pt is type ! Diabetic, on trulicity for a few months    Review of Systems   Constitutional: Negative. Respiratory: Negative for cough. Cardiovascular: Negative for chest pain. Genitourinary: Positive for dysuria, frequency and urgency. Negative for flank pain. Psychiatric/Behavioral: Negative.            Patient Active Problem List   Diagnosis   â¢ Essential hypertension   â¢ Hyperlipidemia   â¢ Type 1 diabetes mellitus without complication (CMS/HCC)   â¢ Acute pain of both shoulders   â¢ Biceps tendinitis of left upper extremity   â¢ Acute streptococcal pharyngitis   â¢ Need for influenza vaccination   â¢ Close exposure to COVID-19 virus   â¢ Advice given about COVID-19 virus infection   â¢ Need for pneumococcal vaccination   â¢ Laceration of left palm   â¢ Obesity (BMI 30-39.9)   â¢ Hypotension due to drugs   â¢ Tonsil symptom   â¢ Onychomycosis   â¢ Tonsil stone       Health Maintenance   Topic Date Due   â¢ COVID-19 Vaccine (4 - Booster for Moderna series) 2022 (Originally 2021)   â¢ Pneumococcal Vaccine 0-64 (2 - PCV) 2023 (Originally 2021)   â¢ Hepatitis B Vaccine (1 of 3 - 3-dose series) 2023 (Originally 1980)   â¢ Diabetes A1C  2023   â¢ Diabetes Foot Exam  2023   â¢ DTaP/Tdap/Td Vaccine (2 - Td or Tdap) 2023   â¢ DM/CKD GFR  2023   â¢ Depression Screening  2023   â¢ Diabetes Eye Exam  2023   â¢ Cervical Cancer Screen 30-64 -  2026   â¢ Influenza Vaccine  Completed   â¢ Meningococcal Vaccine  Aged Out   â¢ HPV Vaccine  Aged Out       Past Medical History:   Diagnosis Date   â¢ Diabetes mellitus type 1 (CMS/HCC)    â¢ Diabetic "retinopathy (CMS/HCC)    â¢ Hyperlipidemia    â¢ Hypertension        Past Surgical History:   Procedure Laterality Date   â¢ Appendectomy  2011   â¢ Foot surgery  2008 & 2009    buniunectomy       Family History   Problem Relation Age of Onset   â¢ Hyperlipidemia Mother    â¢ Hypertension Mother    â¢ Myocardial Infarction Father    â¢ Hyperlipidemia Sister    â¢ Hypertension Sister        Social History     Socioeconomic History   â¢ Marital status: Single     Spouse name: Not on file   â¢ Number of children: Not on file   â¢ Years of education: Not on file   â¢ Highest education level: Not on file   Occupational History   â¢ Occupation:  for a school   Tobacco Use   â¢ Smoking status: Never   â¢ Smokeless tobacco: Never   Substance and Sexual Activity   â¢ Alcohol use: Yes     Comment: 1-2 times a year   â¢ Drug use: Never   â¢ Sexual activity: Not Currently   Other Topics Concern   â¢ Not on file   Social History Narrative   â¢ Not on file     Social Determinants of Health     Financial Resource Strain: Not on file   Food Insecurity: Not on file   Transportation Needs: Not on file   Physical Activity: Not on file   Stress: Not on file   Social Connections: Not on file   Intimate Partner Violence: Not on file       Past medical history, problem list, family medical history, surgical history, and social history have all been reviewed    Objective    Visit Vitals  /71 (BP Location: LUE - Left upper extremity, Patient Position: Sitting, Cuff Size: Large Adult)   Pulse 80   Temp 97.4 Â°F (36.3 Â°C) (Temporal)   Resp 14   Ht 5' 6"" (1.676 m)   Wt 97.1 kg (214 lb)   LMP 12/14/2022 (Exact Date)   BMI 34.54 kg/mÂ²       Physical Exam  Vitals and nursing note reviewed. Constitutional:       General: She is not in acute distress. Appearance: Normal appearance. She is not ill-appearing. Cardiovascular:      Rate and Rhythm: Normal rate and regular rhythm.    Pulmonary:      Effort: Pulmonary effort is normal.     " Breath sounds: Normal breath sounds. Abdominal:      General: Bowel sounds are normal. There is no distension. Palpations: Abdomen is soft. Tenderness: There is no abdominal tenderness. There is no right CVA tenderness, left CVA tenderness, guarding or rebound. Hernia: No hernia is present. Skin:     General: Skin is warm and dry. Neurological:      Mental Status: She is alert and oriented to person, place, and time. Psychiatric:         Mood and Affect: Mood normal.         Behavior: Behavior normal.         Thought Content: Thought content normal.         Assessment/Plan:    Zakiya Hooper was seen today for office visit, follow-up and gu symptoms. Diagnoses and all orders for this visit:    UTI (urinary tract infection), uncomplicated  -     nitrofurantoin, macrocrystal-monohydrate, (MACROBID) 100 MG capsule; Take 1 capsule by mouth in the morning and 1 capsule in the evening. Do all this for 5 days.  -     POCT URINE DIP AUTO  -     URINE, BACTERIAL CULTURE    Type 1 diabetes mellitus without complication (CMS/HCC)        Push fluids,take antibiotic as directed. Report nausea, vomiting, fever or chills to the office. patients blood pressure is 186/87 hr 81 pt asymptomatic, due for Hydralazine 25mg

## 2023-02-20 NOTE — PROGRESS NOTE ADULT - ATTENDING COMMENTS
Applied Patient was seen and examined personally by me. I have discussed the plan and reviewed the resident's note and agree with the above physical exam findings including assessment and plan except as indicated below.    Hypothermic to 94.1 rectal today, unclear etiology.   No S/S of infection, labs unremarkable  F/u Bcx, c/w hypothermia blanket  Possible ANDREEA 6/18